# Patient Record
Sex: FEMALE | Race: WHITE | NOT HISPANIC OR LATINO | Employment: FULL TIME | URBAN - METROPOLITAN AREA
[De-identification: names, ages, dates, MRNs, and addresses within clinical notes are randomized per-mention and may not be internally consistent; named-entity substitution may affect disease eponyms.]

---

## 2017-01-03 ENCOUNTER — OFFICE VISIT (OUTPATIENT)
Dept: ORTHOPEDICS | Facility: CLINIC | Age: 27
End: 2017-01-03
Payer: OTHER MISCELLANEOUS

## 2017-01-03 VITALS — BODY MASS INDEX: 33.29 KG/M2 | WEIGHT: 195 LBS | HEIGHT: 64 IN | TEMPERATURE: 98 F

## 2017-01-03 DIAGNOSIS — G56.01 CARPAL TUNNEL SYNDROME OF RIGHT WRIST: Primary | ICD-10-CM

## 2017-01-03 PROCEDURE — 99024 POSTOP FOLLOW-UP VISIT: CPT | Performed by: PHYSICIAN ASSISTANT

## 2017-01-03 ASSESSMENT — PAIN SCALES - GENERAL: PAINLEVEL: NO PAIN (0)

## 2017-01-03 NOTE — PROGRESS NOTES
"HISTORY OF PRESENT ILLNESS:    Leah Cox is a 26 year old female who is seen in follow up for   Chief Complaint   Patient presents with     RECHECK     Right carpal tunnel release DOS: 10/5/16 (13 weeks postop)     Surgical Followup     PREOPERATIVE DIAGNOSIS: RIGHT carpal tunnel syndrome.  POSTOPERATIVE DIAGNOSIS: RIGHT carpal tunnel syndrome.NAME OF OPERATION: RIGHT carpal tunnel release.   Interval: Patient reports she is able to do most of her duties at work now that she has been loading and unloading. She has found that she wears her brace on occasion at work particularly when lifting awkward pieces of metal so they don't dig into her palm. She has noticed a decreased strength. She states one task of twisting open and items she continues to need help for, but she does feel like she is improving and her strength with each passing day.  Patient evaluation done with Dr. Sebastian    Physical Exam:  Vitals: Temp(Src) 98  F (36.7  C) (Temporal)  Ht 1.626 m (5' 4\")  Wt 88.451 kg (195 lb)  BMI 33.46 kg/m2  BMI= Body mass index is 33.46 kg/(m^2).  Constitutional: healthy, alert and no acute distress   Psychiatric: mentation appears normal and affect normal/bright  NEURO: no focal deficits  Location of surgery:  Right palm  Incision sites:  Incision is healed, swelling is minimal.  Palmar sensitivity is minimal  Range of motion:  Patient demonstrates good range of motion with flexion and extension as well as able to pull all fingers into a     ASSESSMENT:    Chief Complaint   Patient presents with     RECHECK     Right carpal tunnel release DOS: 10/5/16 (13 weeks postop)     Surgical Followup     PREOPERATIVE DIAGNOSIS: RIGHT carpal tunnel syndrome.  POSTOPERATIVE DIAGNOSIS: RIGHT carpal tunnel syndrome.NAME OF OPERATION: RIGHT carpal tunnel release.       ICD-10-CM    1. Carpal tunnel syndrome of right wrist G56.01      Patient is 13 weeks status post right carpal tunnel release. Patient is doing well with " her return to work. She is able to perform most of the duties at her workplace without incident.    Plan:   Patient is aware of techniques and strategies to assist her to do her job.  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 She can wear the brace only as needed  Return to clinic as needed for further concerns.    Barbara Vazquez PA-C   1/3/2017  1:05 PM      I attest I have seen and evaluated the patient.  I agree with above impression and plan.    hCristian Sebastian MD

## 2017-01-03 NOTE — NURSING NOTE
"Chief Complaint   Patient presents with     RECHECK     Right carpal tunnel release DOS: 10/5/16 (13 weeks postop)     Surgical Followup     PREOPERATIVE DIAGNOSIS: RIGHT carpal tunnel syndrome.  POSTOPERATIVE DIAGNOSIS: RIGHT carpal tunnel syndrome.NAME OF OPERATION: RIGHT carpal tunnel release.       Initial Temp(Src) 98  F (36.7  C) (Temporal)  Ht 1.626 m (5' 4\")  Wt 88.451 kg (195 lb)  BMI 33.46 kg/m2 Estimated body mass index is 33.46 kg/(m^2) as calculated from the following:    Height as of this encounter: 1.626 m (5' 4\").    Weight as of this encounter: 88.451 kg (195 lb).  BP completed using cuff size: NA (Not Taken)  HATTIE Oneil  "

## 2017-01-17 ENCOUNTER — TELEPHONE (OUTPATIENT)
Dept: ORTHOPEDICS | Facility: CLINIC | Age: 27
End: 2017-01-17

## 2017-03-03 ENCOUNTER — OFFICE VISIT (OUTPATIENT)
Dept: FAMILY MEDICINE | Facility: CLINIC | Age: 27
End: 2017-03-03
Payer: COMMERCIAL

## 2017-03-03 VITALS
HEIGHT: 64 IN | HEART RATE: 96 BPM | SYSTOLIC BLOOD PRESSURE: 122 MMHG | RESPIRATION RATE: 16 BRPM | TEMPERATURE: 97.1 F | BODY MASS INDEX: 32.95 KG/M2 | DIASTOLIC BLOOD PRESSURE: 82 MMHG | WEIGHT: 193 LBS

## 2017-03-03 DIAGNOSIS — R10.2 PELVIC PAIN IN FEMALE: ICD-10-CM

## 2017-03-03 DIAGNOSIS — Z00.01 ENCOUNTER FOR GENERAL ADULT MEDICAL EXAMINATION WITH ABNORMAL FINDINGS: Primary | ICD-10-CM

## 2017-03-03 DIAGNOSIS — N92.6 IRREGULAR PERIODS: ICD-10-CM

## 2017-03-03 DIAGNOSIS — F43.21 ADJUSTMENT DISORDER WITH DEPRESSED MOOD: ICD-10-CM

## 2017-03-03 PROCEDURE — 99213 OFFICE O/P EST LOW 20 MIN: CPT | Mod: 25 | Performed by: NURSE PRACTITIONER

## 2017-03-03 PROCEDURE — 99395 PREV VISIT EST AGE 18-39: CPT | Performed by: NURSE PRACTITIONER

## 2017-03-03 RX ORDER — CITALOPRAM HYDROBROMIDE 20 MG/1
TABLET ORAL
Qty: 30 TABLET | Refills: 0 | Status: SHIPPED | OUTPATIENT
Start: 2017-03-03 | End: 2017-12-08

## 2017-03-03 ASSESSMENT — PAIN SCALES - GENERAL: PAINLEVEL: NO PAIN (0)

## 2017-03-03 NOTE — MR AVS SNAPSHOT
After Visit Summary   3/3/2017    Leah Cox    MRN: [id number]           Patient Information     Date Of Birth          1990        Visit Information        Provider Department      3/3/2017 3:15 PM Alize Ragland APRN [facility]        Today's Diagnoses     Encounter for general adult medical examination with abnormal findings    -  1    Adjustment disorder with depressed mood        Irregular periods        Pelvic pain in female          Care Instructions      Preventive Health Recommendations  Female Ages 26 - 39  Yearly exam:   See your health care provider every year in order to    Review health changes.     Discuss preventive care.      Review your medicines if you your doctor has prescribed any.    Until age 30: Get a Pap test every three years (more often if you have had an abnormal result).    After age 30: Talk to your doctor about whether you should have a Pap test every 3 years or have a Pap test with HPV screening every 5 years.   You do not need a Pap test if your uterus was removed (hysterectomy) and you have not had cancer.  You should be tested each year for STDs (sexually transmitted diseases), if you're at risk.   Talk to your provider about how often to have your cholesterol checked.  If you are at risk for diabetes, you should have a diabetes test (fasting glucose).  Shots: Get a flu shot each year. Get a tetanus shot every 10 years.   Nutrition:     Eat at least 5 servings of fruits and vegetables each day.    Eat whole-grain bread, whole-wheat pasta and brown rice instead of white grains and rice.    Talk to your provider about Calcium and Vitamin D.     Lifestyle    Exercise at least 150 minutes a week (30 minutes a day, 5 days of the week). This will help you control your weight and prevent disease.    Limit alcohol to one drink per day.    No smoking.     Wear sunscreen to prevent skin cancer.    See your dentist every six months for an  exam and cleaning.          Follow-ups after your visit        Additional Services     MENTAL HEALTH REFERRAL       Your provider has referred you to: [facility]: [facility] - Counseling (Individual/Couples/Family) - [facility] [phone]   [email or web address]   *Please call to schedule an appointment.    All scheduling is subject to the client's specific insurance plan & benefits, provider/location availability, and provider clinical specialities.  Please arrive 15 minutes early for your first appointment and bring your completed paperwork.    Please be aware that coverage of these services is subject to the terms and limitations of your health insurance plan.  Call member services at your health plan with any benefit or coverage questions.            OB/GYN REFERRAL       Your provider has referred you to:  [facility]: [facility] [phone]   [email or web address]    Please be aware that coverage of these services is subject to the terms and limitations of your health insurance plan.  Call member services at your health plan with any benefit or coverage questions.      Please bring the following with you to your appointment:    (1) Any X-Rays, CTs or MRIs which have been performed.  Contact the facility where they were done to arrange for  prior to your scheduled appointment.   (2) List of current medications   (3) This referral request   (4) Any documents/labs given to you for this referral                  Who to contact     If you have questions or need follow up information about today's clinic visit or your schedule please contact [facility] directly at [phone].  Normal or non-critical lab and imaging results will be communicated to you by MyChart, letter or phone within 4 business days after the clinic has received the results. If you do not hear from us within  "7 days, please contact the clinic through [employer] or phone. If you have a critical or abnormal lab result, we will notify you by phone as soon as possible.  Submit refill requests through [employer] or call your pharmacy and they will forward the refill request to us. Please allow 3 business days for your refill to be completed.          Additional Information About Your Visit        [employer] Information     [employer] lets you send messages to your doctor, view your test results, renew your prescriptions, schedule appointments and more. To sign up, go to [email or web address] . Click on \"Log in\" on the left side of the screen, which will take you to the Welcome page. Then click on \"Sign up Now\" on the right side of the page.     You will be asked to enter the access code listed below, as well as some personal information. Please follow the directions to create your username and password.     Your access code is: [email or web address]  Expires: 4/3/2017 12:43 PM     Your access code will  in 90 days. If you need help or a new code, please call your [place] clinic or [phone].        Care EveryWhere ID     This is your Care EveryWhere ID. This could be used by other organizations to access your [place] medical records  [id number]        Your Vitals Were     Pulse Temperature Respirations Height Last Period Peak Flow    96 97.1  F (36.2  C) (Tympanic) 16 5' 3.5\" (1.613 m) 2017 (Exact Date) 98 L/min    BMI (Body Mass Index)                   33.65 kg/m2            Blood Pressure from Last 3 Encounters:   17 122/82   16 108/72   10/05/16 117/89    Weight from Last 3 Encounters:   17 193 lb (87.5 kg)   17 195 lb (88.5 kg)   16 191 lb 12.8 oz (87 kg)              We Performed the Following     MENTAL HEALTH REFERRAL     OB/GYN REFERRAL          Today's Medication Changes          These changes are accurate as of: 3/3/17  3:43 PM.  If you have any questions, ask your nurse or doctor.    "            Start taking these medicines.        Dose/Directions    citalopram 20 MG tablet   Commonly known as:  celeXA   Used for:  Adjustment disorder with depressed mood   Started by:  Alize Ragland APRN CNP        Take 1/2 tablet (10 mg) for 1-2 weeks, then increase to 1 tablet orally daily   Quantity:  30 tablet   Refills:  0            Where to get your medicines      These medications were sent to [facility] - [place], [place] - [address]   [address] [place] [address]     Phone:  [phone]     citalopram 20 MG tablet                Primary Care Provider Office Phone # Fax #    NADJA Pierce [phone]       [facility] [address]   [place] [address]        Thank you!     Thank you for choosing [facility]  for your care. Our goal is always to provide you with excellent care. Hearing back from our patients is one way we can continue to improve our services. Please take a few minutes to complete the written survey that you may receive in the mail after your visit with us. Thank you!             Your Updated Medication List - Protect others around you: Learn how to safely use, store and throw away your medicines at www.disposemymeds.org.          This list is accurate as of: 3/3/17  3:43 PM.  Always use your most recent med list.                   Brand Name Dispense Instructions for use    citalopram 20 MG tablet    celeXA    30 tablet    Take 1/2 tablet (10 mg) for 1-2 weeks, then increase to 1 tablet orally daily

## 2017-03-03 NOTE — PROGRESS NOTES
SUBJECTIVE:     CC: Leah Cox is an 26 year old woman who presents for preventive health visit.     Healthy Habits:    Do you get at least three servings of calcium containing foods daily (dairy, green leafy vegetables, etc.)? yes    Amount of exercise or daily activities, outside of work: none    Problems taking medications regularly not applicable    Medication side effects: No    Have you had an eye exam in the past two years? no    Do you see a dentist twice per year? no    Do you have sleep apnea, excessive snoring or daytime drowsiness?yes        She is also continuing to have lower pelvic pain, and is concerned about her very irregular cycle. Age of menarche was 12, she states she has always had irregular periods at one time she was on oral contraceptives, this did not seem to make any difference. She would like to achieve some regularity of her menses, stating she would like to become pregnant at some point in time. She has a menstrual period every 2-3 months, it typically starts out light, then becomes very heavy, stops, and then his heavy once again. She states she will bleed from 2-3 weeks. She recently had a CT of the abdomen and pelvis for complaint of pelvic pain, CT was negative.    She also is having some issues with depression. It seems to be getting worse recently, and is interfering with her relationship. She reports increased irritability, feeling depressed, over eating, and feeling like a failure. She does report thoughts of being better off dead, but denies any suicidal intent or plan. PHQ-9=24 she has never taken medication for depression.    Today's PHQ-2 Score:   PHQ-2 ( 1999 Pf[employer]) 6/24/2016 12/8/2015   Q1: Little interest or pleasure in doing things 0 0   Q2: Feeling down, depressed or hopeless 0 0   PHQ-2 Score 0 0     Abuse: Current or Past(Physical, Sexual or Emotional)- No  Do you feel safe in your environment - Yes    Social History   Substance Use Topics     Smoking  status: Current Every Day Smoker     Packs/day: 0.25     Smokeless tobacco: Never Used     Alcohol use 0.0 oz/week     0 Standard drinks or equivalent per week      Comment: occ.     The patient does not drink >3 drinks per day nor >7 drinks per week.    No results for input(s): CHOL, HDL, LDL, TRIG, CHOLHDLRATIO, NHDL in the last 09856 hours.    Reviewed orders with patient.  Reviewed health maintenance and updated orders accordingly - Yes    Mammo Decision Support:  Mammogram not appropriate for this patient based on age.    Pertinent mammograms are reviewed under the imaging tab.  History of abnormal Pap smear: NO - age 30-65 PAP every 5 years with negative HPV co-testing recommended    Reviewed and updated as needed this visit by clinical staff  Tobacco  Allergies  Meds         Reviewed and updated as needed this visit by Provider        No past medical history on file.   Past Surgical History   Procedure Laterality Date     Leep tx, cervical  2009     Release carpal tunnel Right 10/5/2016     Procedure: RELEASE CARPAL TUNNEL;  Surgeon: Christian Sebastian MD;  Location: [facility] OR     Obstetric History     No data available          ROS:  C: NEGATIVE for fever, chills, change in weight  I: NEGATIVE for worrisome rashes, moles or lesions  E: NEGATIVE for vision changes or irritation  ENT: NEGATIVE for ear, mouth and throat problems  R: NEGATIVE for significant cough or SOB  B: NEGATIVE for masses, tenderness or discharge  CV: NEGATIVE for chest pain, palpitations or peripheral edema  GI: NEGATIVE for nausea, abdominal pain, heartburn, or change in bowel habits   female: As noted in history of present illness  M: NEGATIVE for significant arthralgias or myalgia  N: NEGATIVE for weakness, dizziness or paresthesias  PSYCHIATRIC: As noted in history of present illness    Problem list, Medication list, Allergies, and Medical/Social/Surgical histories reviewed in [facility] and updated as appropriate.  BP Readings from Last  "3 Encounters:   03/03/17 122/82   11/29/16 108/72   10/05/16 117/89    Wt Readings from Last 3 Encounters:   03/03/17 193 lb (87.5 kg)   01/03/17 195 lb (88.5 kg)   12/01/16 191 lb 12.8 oz (87 kg)                  OBJECTIVE:     /82  Pulse 96  Temp 97.1  F (36.2  C) (Tympanic)  Resp 16  Ht 5' 3.5\" (1.613 m)  Wt 193 lb (87.5 kg)  LMP 02/03/2017 (Exact Date)  PF 98 L/min  BMI 33.65 kg/m2  EXAM:  GENERAL: healthy, alert and no distress  EYES: Eyes grossly normal to inspection, PERRL and conjunctivae and sclerae normal  HENT: ear canals and TM's normal, nose and mouth without ulcers or lesions  NECK: no adenopathy, no asymmetry, masses, or scars and thyroid normal to palpation  RESP: lungs clear to auscultation - no rales, rhonchi or wheezes  BREAST: normal without masses, tenderness or nipple discharge and no palpable axillary masses or adenopathy  CV: regular rate and rhythm, normal S1 S2, no S3 or S4, no murmur, click or rub, no peripheral edema and peripheral pulses strong  ABDOMEN: soft, nontender, no hepatosplenomegaly, no masses and bowel sounds normal  MS: no gross musculoskeletal defects noted, no edema  SKIN: no suspicious lesions or rashes  NEURO: Normal strength and tone, mentation intact and speech normal  PSYCH: mentation appears normal, affect normal/bright    ASSESSMENT/PLAN:         ICD-10-CM    1. Encounter for general adult medical examination with abnormal findings Z00.01    2. Adjustment disorder with depressed mood F43.21 MENTAL HEALTH REFERRAL     citalopram (CELEXA) 20 MG tablet   3. Irregular periods N92.6 OB/GYN REFERRAL     CANCELED: OB/GYN REFERRAL   4. Pelvic pain in female R10.2 OB/GYN REFERRAL     CANCELED: OB/GYN REFERRAL     Patient is referred to gynecology per her request. She is planned to schedule an appointment in [place]    COUNSELING:   Reviewed preventive health counseling, as reflected in patient instructions       Regular exercise       Healthy diet/nutrition       " "Osteoporosis Prevention/Bone Health    BP Screening:   Last 3 BP Readings:    BP Readings from Last 3 Encounters:   03/03/17 122/82   11/29/16 108/72   10/05/16 117/89       The following was recommended to the patient:  Re-screen BP within a year and recommended lifestyle modifications     reports that she has been smoking.  She has been smoking about 0.25 packs per day. She has never used smokeless tobacco.  Tobacco Cessation Action Plan: Information offered: Patient not interested at this time  Estimated body mass index is 33.65 kg/(m^2) as calculated from the following:    Height as of this encounter: 5' 3.5\" (1.613 m).    Weight as of this encounter: 193 lb (87.5 kg).   Weight management plan: Discussed healthy diet and exercise guidelines and patient will follow up in 6 months in clinic to re-evaluate.    Counseling Resources:  ATP IV Guidelines  Pooled Cohorts Equation Calculator  Breast Cancer Risk Calculator  FRAX Risk Assessment  ICSI Preventive Guidelines  Dietary Guidelines for Americans, 2010  USDA's MyPlate  ASA Prophylaxis  Lung CA Screening    NADJA Pierce CNP  [facility]  "

## 2017-03-03 NOTE — NURSING NOTE
"Chief Complaint   Patient presents with     Physical       Initial /82  Pulse 96  Temp 97.1  F (36.2  C) (Tympanic)  Resp 16  Ht 5' 3.5\" (1.613 m)  Wt 193 lb (87.5 kg)  LMP 02/03/2017 (Exact Date)  PF 98 L/min  BMI 33.65 kg/m2 Estimated body mass index is 33.65 kg/(m^2) as calculated from the following:    Height as of this encounter: 5' 3.5\" (1.613 m).    Weight as of this encounter: 193 lb (87.5 kg).  BP completed using cuff size: josé Fritz MA      "

## 2017-03-08 ASSESSMENT — PATIENT HEALTH QUESTIONNAIRE - PHQ9: SUM OF ALL RESPONSES TO PHQ QUESTIONS 1-9: 23

## 2017-03-14 ENCOUNTER — TELEPHONE (OUTPATIENT)
Dept: FAMILY MEDICINE | Facility: CLINIC | Age: 27
End: 2017-03-14

## 2017-03-14 DIAGNOSIS — N92.0 EXCESSIVE OR FREQUENT MENSTRUATION: ICD-10-CM

## 2017-03-14 DIAGNOSIS — N92.0 EXCESSIVE OR FREQUENT MENSTRUATION: Primary | ICD-10-CM

## 2017-03-14 LAB — HGB BLD-MCNC: 15.1 G/DL (ref 11.7–15.7)

## 2017-03-14 PROCEDURE — 85018 HEMOGLOBIN: CPT | Performed by: NURSE PRACTITIONER

## 2017-03-14 PROCEDURE — 36415 COLL VENOUS BLD VENIPUNCTURE: CPT | Performed by: NURSE PRACTITIONER

## 2017-03-14 NOTE — TELEPHONE ENCOUNTER
Reason for Call: Request for an order or referral:    Order or referral being requested: Order for lab work for Anemia - Pt states she is bleeding again and is feeling weak and fatigued    Date needed: as soon as possible    Has the patient been seen by the PCP for this problem? YES    Additional comments: Pt has appt with OB/GYN on 04/07 in [place]    Phone number Patient can be reached at:  Home number on file [phone] (home)    Best Time:  any    Can we leave a detailed message on this number?  YES    Call taken on 3/14/2017 at 8:20 AM by Gia Alford

## 2017-06-28 ENCOUNTER — TELEPHONE (OUTPATIENT)
Dept: ORTHOPEDICS | Facility: CLINIC | Age: 27
End: 2017-06-28

## 2017-06-28 NOTE — TELEPHONE ENCOUNTER
Reason for Call:  Other call back    Detailed comments: Dr. Sebastian patient, Oct., 2016 had right Carpal tunnel surgery - patient has a new position at work and she is using a lot of tools that require gripping and squeezing, she has hand soreness and feels hot by pinky on side of hand, would like a call     Phone Number Patient can be reached at: Cell number on file:    Telephone Information:   Mobile [phone]       Best Time: any time     Can we leave a detailed message on this number? YES    Call taken on 6/28/2017 at 4:03 PM by Marybeth Vivas

## 2017-06-28 NOTE — TELEPHONE ENCOUNTER
I spoke with pt. She has new position. Started last week. She is noting soreness in her hand, gets a warm feeling after she does a task that is in her 4th and 5th finger. Denies numbness or tingling. Is not able to wear her wrist splint in this position.  I told pt that provider will review message tomorrow and someone will communicate with her. Pt works until 1530, you may leave a message and then she would call us back. NADIA Kiser

## 2017-06-29 NOTE — TELEPHONE ENCOUNTER
Called patient to schedule an appointment but her mailbox was full so I was unable to leave a message. Will try again.

## 2017-06-29 NOTE — TELEPHONE ENCOUNTER
4th and 5th finger is typically ulnar nerve problems.  Wrist splint not helpful with this.  Avoid bending at or leaning on elbow if possible.

## 2017-06-30 NOTE — TELEPHONE ENCOUNTER
I spoke with pt. I said she can use OTC meds like Tylenol and Ibuprofen for discomfort, ice if she wishes. NADIA Kiser

## 2017-06-30 NOTE — TELEPHONE ENCOUNTER
Please see message below.   Patient is scheduled on 07/17 -  Would like a call from a nurse to see if there is anything she can do about the pain until her appt.   Hoping for a call today please.  Thank you,  Marybeth.

## 2017-07-24 ENCOUNTER — OFFICE VISIT (OUTPATIENT)
Dept: ORTHOPEDICS | Facility: CLINIC | Age: 27
End: 2017-07-24
Payer: COMMERCIAL

## 2017-07-24 VITALS — BODY MASS INDEX: 33.12 KG/M2 | HEIGHT: 64 IN | TEMPERATURE: 98.7 F | WEIGHT: 194 LBS

## 2017-07-24 DIAGNOSIS — M67.431 GANGLION CYST OF DORSUM OF RIGHT WRIST: Primary | ICD-10-CM

## 2017-07-24 DIAGNOSIS — M25.749 METACARPAL BOSS: ICD-10-CM

## 2017-07-24 PROCEDURE — 99213 OFFICE O/P EST LOW 20 MIN: CPT | Performed by: ORTHOPAEDIC SURGERY

## 2017-07-24 ASSESSMENT — PAIN SCALES - GENERAL: PAINLEVEL: NO PAIN (0)

## 2017-07-24 NOTE — MR AVS SNAPSHOT
"              After Visit Summary   7/24/2017    Leah Cox    MRN: [id number]           Patient Information     Date Of Birth          1990        Visit Information        Provider Department      7/24/2017 4:00 PM Christian Sebastian MD [facility]         Follow-ups after your visit        Your next 10 appointments already scheduled     Jul 24, 2017  4:00 PM CDT   New Visit with Christian Sebastian MD   [facility] ([facility]    [address] [place] [id number]   [phone]              Who to contact     If you have questions or need follow up information about today's clinic visit or your schedule please contact [facility] directly at [phone].  Normal or non-critical lab and imaging results will be communicated to you by MyChart, letter or phone within 4 business days after the clinic has received the results. If you do not hear from us within 7 days, please contact the clinic through Arctrievalhart or phone. If you have a critical or abnormal lab result, we will notify you by phone as soon as possible.  Submit refill requests through ScreenMedix or call your pharmacy and they will forward the refill request to us. Please allow 3 business days for your refill to be completed.          Additional Information About Your Visit        ArctrievalharProntoForms Information     ScreenMedix lets you send messages to your doctor, view your test results, renew your prescriptions, schedule appointments and more. To sign up, go to [email or web address] . Click on \"Log in\" on the left side of the screen, which will take you to the Welcome page. Then click on \"Sign up Now\" on the right side of the page.     You will be asked to enter the access code listed below, as well as some personal information. Please follow the directions to create your username and password.     Your access code is: [email or web address]  Expires: 10/15/2017  4:33 PM     Your access code will " " in 90 days. If you need help or a new code, please call your [facility] clinic or [phone].        Care EveryWhere ID     This is your Care EveryWhere ID. This could be used by other organizations to access your [facility] medical records  [id number]        Your Vitals Were     Temperature Height BMI (Body Mass Index)             98.7  F (37.1  C) (Temporal) 1.613 m (5' 3.5\") 33.83 kg/m2          Blood Pressure from Last 3 Encounters:   17 122/82   16 108/72   10/05/16 117/89    Weight from Last 3 Encounters:   17 88 kg (194 lb)   17 87.5 kg (193 lb)   17 88.5 kg (195 lb)              Today, you had the following     No orders found for display       Primary Care Provider Office Phone # Fax #    Alize Ragland, NADJA [facility] [phone]       [facility] [address] [facility]   [facility] [address]        Equal Access to Services     [facility]: Hadii aad ku hadasho Soomaali, waaxda luqadaha, qaybta kaalmada adeegyada, maday good . So [facility] [phone].    ATENCIÓN: Si habla español, tiene a varghese disposición servicios gratuitos de asistencia lingüística. Llame al [phone].    We comply with applicable federal civil rights laws and [place] laws. We do not discriminate on the basis of race, color, national origin, age, disability sex, sexual orientation or gender identity.            Thank you!     Thank you for choosing [facility]  for your care. Our goal is always to provide you with excellent care. Hearing back from our patients is one way we can continue to improve our services. Please take a few minutes to complete the written survey that you may receive in the mail after your visit with us. Thank you!             Your Updated Medication List - Protect others around you: Learn how to safely use, store and throw away your medicines at www.disposemymeds.org.          This list is accurate as of: 17  " 3:54 PM.  Always use your most recent med list.                   Brand Name Dispense Instructions for use Diagnosis    citalopram 20 MG tablet    celeXA    30 tablet    Take 1/2 tablet (10 mg) for 1-2 weeks, then increase to 1 tablet orally daily    Adjustment disorder with depressed mood

## 2017-07-24 NOTE — PROGRESS NOTES
"HISTORY OF PRESENT ILLNESS:    Leah Cox is a 26 year old female who is seen in follow up for   Chief Complaint   Patient presents with     RECHECK     NEW ISSUE: Right hand Ganglion cyst.   Present symptoms: Patient reports a cyst on the back of her hand that she believes was present during the time she was undergoing her carpal tunnel symptoms. The carpal tunnel symptoms were predominant at the time.  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 Patient states she does continue to utilize a brace and gloves at work. Patient has been attempting to massage this cyst to try to reduce it.  Patient states the wrist bothers her at work as well as with some domestic chores of washing dishes or putting sheets on a bed.  Treatments tried to this point: As above  Patient evaluation done with Dr. Sebastian    Physical Exam:  Vitals: Temp 98.7  F (37.1  C) (Temporal)  Ht 1.613 m (5' 3.5\")  Wt 88 kg (194 lb)  BMI 33.83 kg/m2  BMI= Body mass index is 33.83 kg/(m^2).  Constitutional: healthy, alert and no acute distress   Psychiatric: mentation appears normal and affect normal/bright  NEURO: no focal deficits  SKIN: no excoriation or erythema. No signs of infection.  JOINT/EXTREMITIES:  Affected extremity pulses are easily palpable.  Right Wrist Exam: WRIST:  Inspection: There is no significant surrounding swelling however there is a firm nodule at the proximal base of the third metacarpal on the dorsal side of her right hand.  With palpation this is nontender however it is very firm.  Tapping of this nodule does cause tingling into her middle finger back of the dorsum of the hand.  Right carpal tunnel scar is well-healed with no surrounding swelling.  Range of Motion: Normal into all fingers and wrist  Patient with no additional pain with resistance in flexion or extension.  She is " able to maintain good strength.    IMAGING INTERPRETATION: No previous hand films are available      ASSESSMENT:  Chief Complaint   Patient presents with     RECHECK     NEW ISSUE: Right hand Ganglion cyst.       ICD-10-CM    1. Ganglion cyst of dorsum of right wrist M67.431    2. Metacarpal boss M25.749      Patient with a cystt/nodule on the dorsum at the proximal base of the right third metacarpal.  This nodule is more firm than usual for a ganglion cyst which may be more likely metacarpal bossing however this is nearby a joint so ganglion cyst cannot be excluded.    Plan:   Patient is advised of the 2 possibilities of diagnosis.   Patient is asked to attempt to protect the cyst with felt or corn padding to protect from any unintentional banging of the cyst. This would be a donut formation around the cyst/nodule .she was given some felt to do this and she is encouraged to purchase from the store in the podiatry section corn protectors. Patient should wear this padding especially at work since she does utilize a splint that may be causing additional irritation to the area. Patient is also asked to refrain from massaging the area.   Patient will give this strategy a couple weeks to determine if there is any improvement in symptoms.   If no improvement surgical exploration and removal of the cyst/nodule would be performed.   Return to clinic as needed     Scribed by  Barbara Vazquez PA-C   7/24/2017  5:08 PM      I attest I have seen and evaluated the patient.  I agree with above impression and plan.    Christian Sebastian MD

## 2017-07-24 NOTE — NURSING NOTE
"Chief Complaint   Patient presents with     RECHECK     NEW ISSUE: Right hand Ganglion cyst.       Initial Temp 98.7  F (37.1  C) (Temporal)  Ht 1.613 m (5' 3.5\")  Wt 88 kg (194 lb)  BMI 33.83 kg/m2 Estimated body mass index is 33.83 kg/(m^2) as calculated from the following:    Height as of this encounter: 1.613 m (5' 3.5\").    Weight as of this encounter: 88 kg (194 lb).  Medication Reconciliation: complete   HATTIE Oneil  "

## 2017-07-24 NOTE — LETTER
"    7/24/2017         RE: Leah Cox  [address] [facility] [address]        Dear Colleague,    Thank you for referring your patient, Leah Cox, to the [facility]. Please see a copy of my visit note below.    HISTORY OF PRESENT ILLNESS:    Leah Cox is a 26 year old female who is seen in follow up for   Chief Complaint   Patient presents with     RECHECK     NEW ISSUE: Right hand Ganglion cyst.   Present symptoms: Patient reports a cyst on the back of her hand that she believes was present during the time she was undergoing her carpal tunnel symptoms. The carpal tunnel symptoms were predominant at the time.  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 Patient states she does continue to utilize a brace and gloves at work. Patient has been attempting to massage this cyst to try to reduce it.  Patient states the wrist bothers her at work as well as with some domestic chores of washing dishes or putting sheets on a bed.  Treatments tried to this point: As above  Patient evaluation done with Dr. Sebastian    Physical Exam:  Vitals: Temp 98.7  F (37.1  C) (Temporal)  Ht 1.613 m (5' 3.5\")  Wt 88 kg (194 lb)  BMI 33.83 kg/m2  BMI= Body mass index is 33.83 kg/(m^2).  Constitutional: healthy, alert and no acute distress   Psychiatric: mentation appears normal and affect normal/bright  NEURO: no focal deficits  SKIN: no excoriation or erythema. No signs of infection.  JOINT/EXTREMITIES:  Affected extremity pulses are easily palpable.  Right Wrist Exam: WRIST:  Inspection: There is no significant surrounding swelling however there is a firm nodule at the proximal base of the third metacarpal on the dorsal side of her right hand.  With palpation this is nontender however it is very firm.  Tapping of this nodule does cause tingling into her " middle finger back of the dorsum of the hand.  Right carpal tunnel scar is well-healed with no surrounding swelling.  Range of Motion: Normal into all fingers and wrist  Patient with no additional pain with resistance in flexion or extension.  She is able to maintain good strength.    IMAGING INTERPRETATION: No previous hand films are available      ASSESSMENT:  Chief Complaint   Patient presents with     RECHECK     NEW ISSUE: Right hand Ganglion cyst.       ICD-10-CM    1. Ganglion cyst of dorsum of right wrist M67.431    2. Metacarpal boss M25.749      Patient with a cystt/nodule on the dorsum at the proximal base of the right third metacarpal.  This nodule is more firm than usual for a ganglion cyst which may be more likely metacarpal bossing however this is nearby a joint so ganglion cyst cannot be excluded.    Plan:   Patient is advised of the 2 possibilities of diagnosis.   Patient is asked to attempt to protect the cyst with felt or corn padding to protect from any unintentional banging of the cyst. This would be a donut formation around the cyst/nodule .she was given some felt to do this and she is encouraged to purchase from the store in the podiatry section corn protectors. Patient should wear this padding especially at work since she does utilize a splint that may be causing additional irritation to the area. Patient is also asked to refrain from massaging the area.   Patient will give this strategy a couple weeks to determine if there is any improvement in symptoms.   If no improvement surgical exploration and removal of the cyst/nodule would be performed.   Return to clinic as needed     Scribed by  Barbara Vazquez PA-C   7/24/2017  5:08 PM      I attest I have seen and evaluated the patient.  I agree with above impression and plan.    Christian Sebastian MD    Again, thank you for allowing me to participate in the care of your patient.        Sincerely,        Christian Sebastian MD

## 2017-10-02 ENCOUNTER — RADIANT APPOINTMENT (OUTPATIENT)
Dept: GENERAL RADIOLOGY | Facility: OTHER | Age: 27
End: 2017-10-02
Attending: PHYSICAL MEDICINE & REHABILITATION
Payer: COMMERCIAL

## 2017-10-02 ENCOUNTER — OFFICE VISIT (OUTPATIENT)
Dept: ORTHOPEDICS | Facility: OTHER | Age: 27
End: 2017-10-02
Payer: COMMERCIAL

## 2017-10-02 VITALS
BODY MASS INDEX: 33.12 KG/M2 | WEIGHT: 194 LBS | HEIGHT: 64 IN | DIASTOLIC BLOOD PRESSURE: 84 MMHG | SYSTOLIC BLOOD PRESSURE: 122 MMHG

## 2017-10-02 DIAGNOSIS — M79.10 MYALGIA: ICD-10-CM

## 2017-10-02 DIAGNOSIS — S29.019A THORACIC MYOFASCIAL STRAIN, INITIAL ENCOUNTER: ICD-10-CM

## 2017-10-02 DIAGNOSIS — M54.41 CHRONIC BILATERAL LOW BACK PAIN WITH RIGHT-SIDED SCIATICA: Primary | ICD-10-CM

## 2017-10-02 DIAGNOSIS — G89.29 CHRONIC BILATERAL LOW BACK PAIN WITH RIGHT-SIDED SCIATICA: Primary | ICD-10-CM

## 2017-10-02 DIAGNOSIS — M54.50 ACUTE LOW BACK PAIN: ICD-10-CM

## 2017-10-02 PROCEDURE — 99204 OFFICE O/P NEW MOD 45 MIN: CPT | Performed by: PHYSICAL MEDICINE & REHABILITATION

## 2017-10-02 PROCEDURE — 72100 X-RAY EXAM L-S SPINE 2/3 VWS: CPT

## 2017-10-02 RX ORDER — CYCLOBENZAPRINE HCL 5 MG
TABLET ORAL
Qty: 30 TABLET | Refills: 0 | Status: SHIPPED | OUTPATIENT
Start: 2017-10-02 | End: 2017-12-08

## 2017-10-02 NOTE — LETTER
October 2, 2017      Leah Cox  [address] [address] [place] [id number]          To Whom It May Concern:        Leah Cox  was seen on 10/2/17 for back pain.  She is able to return to work with the following restrictions for 2 weeks:    -No lifting over 15 lbs  -No repetitive bending    After 2 weeks, she may return to full work duties. She will be seen in follow up in 4-6 weeks for reevaluation. Thank you for your help in advance.         Sincerely,        Mirella Jaimes MD, CAQ

## 2017-10-02 NOTE — MR AVS SNAPSHOT
After Visit Summary   10/2/2017    Leah Cox    MRN: [id number]           Patient Information     Date Of Birth          1990        Visit Information        Provider Department      10/2/2017 2:20 PM Mirella Jaimes MD [facility]        Today's Diagnoses     Chronic bilateral low back pain with right-sided sciatica    -  1    Myalgia          Care Instructions    Today's Plan of Care:  -Referral to Dania Grajeda - [phone]  -Rehab: Physical Therapy: [facility] - [phone]. Please do 5-6 days of exercises per week between formal sessions and the home exercises they provide.  -Prescription Medication as directed: Cyclobenzaprine. Do not take prior to driving  -Work restrictions: 15 lb lifting and no repetitive bending for 2 weeks, full duty after. Letter provided.  -Discussed weight loss  -Ibuprofen (Advil) maximum of 800mg every 4-6 hours with food as needed for pain. Or Acetaminophen (Tylenol) 1000mg every 4-6 hours as needed (Maximum of 3000mg/day)    Follow Up: 4-6 weeks or sooner if symptoms fail to improve or worsen. Call with any questions or concerns.               Follow-ups after your visit        Additional Services     CHIROPRACTIC REFERRAL       Your provider has referred you to: Dania Grajeda - [phone]    Please be aware that coverage of these services is subject to the terms and limitations of your health insurance plan.  Call member services at your health plan with any benefit or coverage questions.      Please bring the following to your appointment:    >>   Any x-rays, CTs or MRIs which have been performed.  Contact the facility where they were done to arrange for  prior to your scheduled appointment.    >>   List of current medications   >>   This referral request   >>   Any documents/labs given to you for this referral            PHYSICAL THERAPY REFERRAL       *This therapy  "referral will be filtered to a centralized scheduling office at [facility] and the patient will receive a call to schedule an appointment at a [facility] location most convenient for them. *     [facility] provides Physical Therapy evaluation and treatment and many specialty services across the [facility] system.  If requesting a specialty program, please choose from the list below.    If you have not heard from the scheduling office within 2 business days, please call [phone] for all locations, with the exception of [place], please call [phone].  Treatment: Evaluation & Treatment  Special Instructions/Modalities: none  Special Programs: None    Please be aware that coverage of these services is subject to the terms and limitations of your health insurance plan.  Call member services at your health plan with any benefit or coverage questions.      **Note to Provider:  If you are referring outside of [facility] for the therapy appointment, please list the name of the location in the \"special instructions\" above, print the referral and give to the patient to schedule the appointment.                  Who to contact     If you have questions or need follow up information about today's clinic visit or your schedule please contact [facility] directly at [phone].  Normal or non-critical lab and imaging results will be communicated to you by MyChart, letter or phone within 4 business days after the clinic has received the results. If you do not hear from us within 7 days, please contact the clinic through MyChart or phone. If you have a critical or abnormal lab result, we will notify you by phone as soon as possible.  Submit refill requests through AcEmpire or call your pharmacy and they will forward the refill request to us. Please allow 3 business days for your refill to be completed.          Additional Information About Your Visit        MyChart " "Information     Bridj lets you send messages to your doctor, view your test results, renew your prescriptions, schedule appointments and more. To sign up, go to [email or web address] . Click on \"Log in\" on the left side of the screen, which will take you to the Welcome page. Then click on \"Sign up Now\" on the right side of the page.     You will be asked to enter the access code listed below, as well as some personal information. Please follow the directions to create your username and password.     Your access code is: [email or web address]  Expires: 10/15/2017  4:33 PM     Your access code will  in 90 days. If you need help or a new code, please call your [facility] clinic or [phone].        Care EveryWhere ID     This is your Care EveryWhere ID. This could be used by other organizations to access your [facility] medical records  [id number]        Your Vitals Were     Height BMI (Body Mass Index)                5' 3.5\" (1.613 m) 33.83 kg/m2           Blood Pressure from Last 3 Encounters:   10/02/17 122/84   17 122/82   16 108/72    Weight from Last 3 Encounters:   10/02/17 194 lb (88 kg)   17 194 lb (88 kg)   17 193 lb (87.5 kg)              We Performed the Following     CHIROPRACTIC REFERRAL     PHYSICAL THERAPY REFERRAL          Today's Medication Changes          These changes are accurate as of: 10/2/17  3:04 PM.  If you have any questions, ask your nurse or doctor.               Start taking these medicines.        Dose/Directions    cyclobenzaprine 5 MG tablet   Commonly known as:  FLEXERIL   Used for:  Chronic bilateral low back pain with right-sided sciatica, Myalgia   Started by:  Mirella Jaimes MD        Take 1-2 tabs as needed at bedtime for pain interrupting sleep   Quantity:  30 tablet   Refills:  0            Where to get your medicines      These medications were sent to [facility], [place] - [address], " [place] [address]     Phone:  [phone]     cyclobenzaprine 5 MG tablet                Primary Care Provider Office Phone # Fax #    Alize Ragland APRKIAH [facility] [phone]       [address] [place] [address]        Equal Access to Services     [facility]: Hadii aad ku hadasho Soomaali, waaxda luqadaha, qaybta kaalmada adeegyada, waxay idiin hayaan adedanie khluzleanna lasocorro . So [facility] [phone].    ATENCIÓN: Si habla español, tiene a varghese disposición servicios gratuitos de asistencia lingüística. [facility] [phone].    We comply with applicable federal civil rights laws and [place] laws. We do not discriminate on the basis of race, color, national origin, age, disability, sex, sexual orientation, or gender identity.            Thank you!     Thank you for choosing [facility]  for your care. Our goal is always to provide you with excellent care. Hearing back from our patients is one way we can continue to improve our services. Please take a few minutes to complete the written survey that you may receive in the mail after your visit with us. Thank you!             Your Updated Medication List - Protect others around you: Learn how to safely use, store and throw away your medicines at www.disposemymeds.org.          This list is accurate as of: 10/2/17  3:04 PM.  Always use your most recent med list.                   Brand Name Dispense Instructions for use Diagnosis    citalopram 20 MG tablet    celeXA    30 tablet    Take 1/2 tablet (10 mg) for 1-2 weeks, then increase to 1 tablet orally daily    Adjustment disorder with depressed mood       cyclobenzaprine 5 MG tablet    FLEXERIL    30 tablet    Take 1-2 tabs as needed at bedtime for pain interrupting sleep    Chronic bilateral low back pain with right-sided sciatica, Myalgia

## 2017-10-02 NOTE — PROGRESS NOTES
Sports Medicine Clinic Visit    PCP: Alize Ragland    CC: Patient presents with:  Back Pain      HPI:  Leah Cox is a 26 year old female who is seen as a self referral.  She notes bilateral mid-upper back pain that started on 9/30/17 with insidious onset.  She also notes bilateral low back pain, left is worse than the right, around the glute area, that worsened 2 years ago.  This pain originally started in 2007.  At the time, she had an MRI of the low back, which was negative.  Currently,  she rates the pain at a  8/10 at its worst and a 8/10 currently.  Symptoms are relieved with heat, ibuprofen, and Tylenol.  They are worsened by trunk flexion and coming back up from trunk flexion, sitting or standing for a prolonged period of time.  She endorses numbness, tingling and weakness in the right leg.   The numbness is intermittent and in the right lateral thigh.  She denies swelling, bruising, popping, grinding, catching, locking, instability, pain in other joints and fever, chills.  Other treatment has included nothing.       Review of Systems:  Musculoskeletal: as above  Remainder of review of systems is negative including constitutional, eyes, ENT, CV, pulmonary, GI, , endocrine, skin, hematologic, and neurologic except as noted in HPI or medical history.    History reviewed. No pertinent past surgical/medical/family/social history other than as mentioned in HPI.    No past medical history on file.  Past Surgical History:   Procedure Laterality Date     LEEP TX, CERVICAL  2009     RELEASE CARPAL TUNNEL Right 10/5/2016    Procedure: RELEASE CARPAL TUNNEL;  Surgeon: Christian Sebastian MD;  Location:  OR     No family history on file.  Social History     Social History     Marital status: Single     Spouse name: N/A     Number of children: N/A     Years of education: N/A     Occupational History     Not on file.     Social History Main Topics     Smoking status: Current Every Day Smoker      "Packs/day: 0.25     Smokeless tobacco: Never Used     Alcohol use 0.0 oz/week     0 Standard drinks or equivalent per week      Comment: occ.     Drug use: No     Sexual activity: Yes     Partners: Male     Other Topics Concern     Not on file     Social History Narrative       She works as a , loading an assembly line.     Current Outpatient Prescriptions   Medication     cyclobenzaprine (FLEXERIL) 5 MG tablet     citalopram (CELEXA) 20 MG tablet     No current facility-administered medications for this visit.      No Known Allergies      Objective:  /84  Ht 5' 3.5\" (1.613 m)  Wt 194 lb (88 kg)  BMI 33.83 kg/m2    General: Alert and in mild distress    Head: Normocephalic, atraumatic  Eyes: no scleral icterus or conjunctival erythema   Oropharynx:  Mucous membranes moist  Skin: no erythema, ecchymosis, petechiae, or jaundice  CV: regular rhythm by palpation, 2+ distal pulses  Resp: normal respiratory effort without conversational dyspnea   Psych: normal mood and affect    Gait: Slow  Neuro: Motor strength and sensation as noted below    Musculoskeletal:    Low back exam:    Inspection:     no visible deformity in the low back       normal skin       normal vascular       normal lymphatic       no asymmetry    Palpation:  Tender to palpation over the sacral spine, bilateral thoracic paraspinal muscles, and left gluteal muscles.      ROM: Decreased lumbar extension.  Full lumbar flexion, rotation, and lateral flexion  All lumbar movements are painful.    Strength:  5/5 hip flexion/abduction/adduction, knee flexion/extension, ankle dorsiflexion/plantarflexion, great toe extension, toe flexion    Sensation:    grossly intact throughout lower extremities    Special tests:      straight leg raise negative for radicular pain    Radiology:  Independent visualization of images performed.    Recent Results (from the past 744 hour(s))   XR Lumbar Spine 2/3 Views    Narrative    XR LUMBAR SPINE 2-3 VIEWS   " 10/2/2017 2:36 PM     HISTORY:  Low back pain      Impression    IMPRESSION: Unremarkable exam.    PATRICA GIRALDO MD     Assessment:  1. Chronic bilateral low back pain with right-sided sciatica    2. Myalgia    3. Thoracic myofascial strain, initial encounter        Plan:  Discussed the assessment with the patient and developed a plan together:  -Suspect this is mostly muscular and functional related to core weakness, weight, posture, etc.  Discussed the role of extra truncal weight on the mid and low back.  She would benefit from healthy weight loss using diet and exercise, no more than 1-2 pounds per week.  -Referral to Dania Grajeda - [phone]  -Rehab: Physical Therapy: [facility] - [phone]. Please do 5-6 days of exercises per week between formal sessions and the home exercises they provide.  -Prescription Medication as directed: Flexeril at bedtime as needed for muscle spasms/tightness. Do not take prior to driving  -Work restrictions: 15 lb lifting and no repetitive bending for 2 weeks, full duty after. Letter provided.  -Discussed weight loss  -Ibuprofen (Advil) maximum of 800mg every 4-6 hours with food as needed for pain and/or Acetaminophen (Tylenol) 1000mg every 4-6 hours as needed (Maximum of 3000mg/day)    Follow Up: 4-6 weeks or sooner if symptoms fail to improve or worsen. Call with any questions or concerns.     Carmen Jaimes MD, CAQ  [facility]

## 2017-10-02 NOTE — PATIENT INSTRUCTIONS
Today's Plan of Care:  -Referral to Dania Grajeda - [phone]  -Rehab: [facility] - [phone]. Please do 5-6 days of exercises per week between formal sessions and the home exercises they provide.  -Prescription Medication as directed: Cyclobenzaprine. Do not take prior to driving  -Work restrictions: 15 lb lifting and no repetitive bending for 2 weeks, full duty after. Letter provided.  -Discussed weight loss  -Ibuprofen (Advil) maximum of 800mg every 4-6 hours with food as needed for pain. Or Acetaminophen (Tylenol) 1000mg every 4-6 hours as needed (Maximum of 3000mg/day)    Follow Up: 4-6 weeks or sooner if symptoms fail to improve or worsen. Call with any questions or concerns.

## 2017-10-13 ENCOUNTER — HOSPITAL ENCOUNTER (OUTPATIENT)
Dept: PHYSICAL THERAPY | Facility: CLINIC | Age: 27
Setting detail: THERAPIES SERIES
End: 2017-10-13
Attending: PHYSICAL MEDICINE & REHABILITATION
Payer: COMMERCIAL

## 2017-10-13 PROCEDURE — 97162 PT EVAL MOD COMPLEX 30 MIN: CPT | Mod: GP | Performed by: PHYSICAL THERAPIST

## 2017-10-13 PROCEDURE — 40000718 ZZHC STATISTIC PT DEPARTMENT ORTHO VISIT: Performed by: PHYSICAL THERAPIST

## 2017-10-13 PROCEDURE — 97140 MANUAL THERAPY 1/> REGIONS: CPT | Mod: GP | Performed by: PHYSICAL THERAPIST

## 2017-10-13 PROCEDURE — 97530 THERAPEUTIC ACTIVITIES: CPT | Mod: GP,59 | Performed by: PHYSICAL THERAPIST

## 2017-10-18 NOTE — ADDENDUM NOTE
Encounter addended by: Mirella Peña, PT on: 10/18/2017  3:02 PM<BR>     Actions taken: Flowsheet accepted

## 2017-10-18 NOTE — PROGRESS NOTES
10/13/17 1608   General Information   Type of Visit Initial OP Ortho PT Evaluation   Start of Care Date 10/13/17   Referring Physician Dr. Mirella Jaimes   Patient/Family Goals Statement to be able to walk her dog and to sleep through the night   Orders Evaluate and Treat   Date of Order 10/02/17   Insurance Type Other  (preferred one)   Insurance Comments/Visits Authorized Preferred One   Medical Diagnosis chronic (B) low back pain   Surgical/Medical history reviewed Yes   Precautions/Limitations no known precautions/limitations   Body Part(s)   Body Part(s) Lumbar Spine/SI   Presentation and Etiology   Pertinent history of current problem (include personal factors and/or comorbidities that impact the POC) Pt reports that she has had LBP x approximately 2 years (hips tend to be sore all the time). It has gotten worse and she now gets pain and numbness down her R lateral thigh to mid thigh.  She works at [employer] as a  and has been put on light duty. She notes that her sleep is affected and she can not sleep for more than 6 hours although the flexoril that Dr. Jaimes prescribed has helped that some.  She reports she sleeps on her belly with her L knee drawn up. hips tend to be sore all the time. it has been going on for a while. sometimes she gets a shock down her leg and numbness in R leg and it shoots down to mid thigh. has been going on for a couple of years now.   Pt was working  at [employer] as a .  can sleep 6-7 hours tosses and turns due to back pain sleeps on belly/    Impairments A. Pain;B. Decreased WB tolerance;D. Decreased ROM   Functional Limitations perform activities of daily living;perform required work activities;perform desired leisure / sports activities   Symptom Location low back. and (B) hips iliac crests and down R leg to knee   How/Where did it occur With repetition/overuse;From insidious onset   Onset date of current episode/exacerbation 09/29/17   Chronicity  Chronic   Pain rating (0-10 point scale) Best (/10);Worst (/10)   Best (/10) 5   Worst (/10) 8   Pain quality B. Dull;C. Aching;D. Burning;E. Shooting   Frequency of pain/symptoms A. Constant   Pain/symptoms are: The same all the time   Pain/symptoms exacerbated by C. Lifting;D. Carrying;G. Certain positions;K. Home tasks;L. Work tasks;A. Sitting;I. Bending   Pain/symptoms eased by B. Walking;G. Heat;H. Cold;I. OTC medication(s);C. Rest;E. Changing positions   Progression of symptoms since onset: Worsened  (more consistent )   Current / Previous Interventions   Diagnostic Tests: X-ray   X-ray Results unremarkable   Prior Level of Function   Prior Level of Function-Mobility inedp   Prior Level of Function-ADLs indep   Functional Level Prior Comment inedp   Current Level of Function   Current Community Support Family/friend caregiver   Patient role/employment history A. Employed   Employment Comments [employer]   Living environment Apartment/condo   Home/community accessibility 12 stairs  to her apartment   Current equipment-Gait/Locomotion None   Fall Risk Screen   Fall screen completed by PT   Per patient - Fall 2 or more times in past year? No   Per patient - Fall with injury in past year? No   Is patient a fall risk? No   System Outcome Measures   Outcome Measures Low Back Pain (see Oswestry and Hi)   Functional Scales   Functional Scales Patient Specific Functional Scale   Patient Specific Functional Scale Q1(/10):;Q1:;Q2:;Q2( /10):;Q3:;Q3( /10):   Q1: Walking my dog   Q1(/10): 3   Q2: playing sports   Q2( /10): 0   Q3: going to the mall    Q3( /10): 5   Lumbar Spine/SI Objective Findings   Observation Very guarded in her movements   Integumentary no concerns   Posture shifted slightly to the R. posterior pelvic tilt in standing and rounded shoulders   Gait/Locomotion grossly WFL decreased stance on R. shifted to R   Flexion ROM hands to knees good reversal of thoracic and lumbar curves but painful    Extension ROM 30% painful     Right Side Bending ROM 50% painful   Left Side Bending ROM 30%   Lumbar ROM Comment rotation to L not painful, rotation to R painful   Pelvic Screen Pain at PSIS,  ASIS not level EUGENIO positive posteriorly   Hip Screen Squat test negative, scour negative EUGENIO negative for hip but IR is more limited on R.    Hip Flexion (L2) Strength pain on R, 5/5 on L   Hip Abduction Strength 4/5   Hip Adduction Strength 4/5   Knee Flexion Strength 4/5   Knee Extension (L3) Strength 4/5   Ankle Dorsiflexion (L4) Strength 5/5   Great Toe Extension (L5) Strength 5/5   Ankle Plantar Flexion (S1) Strength 5/5   Lumbar/Hip/Knee/Foot Strength Comments 5/5   Hamstring Flexibility 45 degrees (B)   Hip Flexor Flexibility WNL   Quadricep Flexibility WNL   Lumbar/SI Flexibility Comments Pain inhibiting strength in hips (B)   SLR positive on R   Crossover SLR negative   Slump Test neg   Spring Test Tight and tender from mid thoracic through lumbar   Segmental Mobility tight at mid thoracic and lower lumbar levels   Palpation TTP at ASIS, GT(R), PSIS on  R gluteals   Planned Therapy Interventions   Planned Therapy Interventions manual therapy;neuromuscular re-education;ROM;strengthening;stretching   Planned Modality Interventions   Planned Modality Interventions Cryotherapy;Electrical stimulation   Planned Modality Interventions Comments PRN   Clinical Impression   Criteria for Skilled Therapeutic Interventions Met yes, treatment indicated   PT Diagnosis Pain in R hip/pelvis and low back , decreased WB R  decreased strength R LE and core,  decreased flexibility consistent with LBP with radicular sx.    Influenced by the following impairments pain, decreased flexibility, decreased functional strength,    Functional limitations due to impairments pain with all functional mobility and gait   Clinical Presentation Evolving/Changing   Clinical Presentation Rationale it is affecting all aspects of pt's life (work ,  home , leisure ) and invoves the R LE and hip as well as lumbar spine   Clinical Decision Making (Complexity) Moderate complexity   Therapy Frequency 2 times/Week   Predicted Duration of Therapy Intervention (days/wks) 90 days   Risk & Benefits of therapy have been explained Yes   Patient, Family & other staff in agreement with plan of care Yes   Clinical Impression Comments Pt presents with Pain in R hip/pelvis and low back , decreased WB R  decreased strength R LE and core,  decreased flexibility consistent with LBP with radicular sx.  She will benefit from skilled PT for manual techiques,and modalities as well as aquatic therapy for pain control  and to decrease muscle spasm and improve fucntional alignment.  She will also benefit from instruction in an HEP for core stabilization and strengthenign   Education Assessment   Preferred Learning Style Listening   Barriers to Learning No barriers   ORTHO GOALS   PT Ortho Eval Goals 1;2;3   Ortho Goal 1   Goal Identifier Walking dog   Goal Description Pt able to walk her dog x 15 mins with out increased back pain   Target Date 01/11/18   Ortho Goal 2   Goal Identifier Playing sports   Goal Description Pt able to walk or light jog x 5 mins in order to consider playing softball next season   Target Date 01/11/18   Ortho Goal 3   Goal Identifier Work   Goal Description Pt able to perform all of her work duties with only a 1-2 point increased pain in her back for a full shift    Target Date 01/11/18   Total Evaluation Time   Total Evaluation Time 40

## 2017-10-18 NOTE — ADDENDUM NOTE
Encounter addended by: Mirella Peña, PT on: 10/18/2017  3:01 PM<BR>     Actions taken: Sign clinical note, Flowsheet accepted

## 2017-11-01 ENCOUNTER — TELEPHONE (OUTPATIENT)
Dept: FAMILY MEDICINE | Facility: CLINIC | Age: 27
End: 2017-11-01

## 2017-11-01 ENCOUNTER — HOSPITAL ENCOUNTER (EMERGENCY)
Facility: CLINIC | Age: 27
Discharge: HOME | End: 2017-11-01
Attending: EMERGENCY MEDICINE | Admitting: EMERGENCY MEDICINE
Payer: COMMERCIAL

## 2017-11-01 VITALS
SYSTOLIC BLOOD PRESSURE: 135 MMHG | TEMPERATURE: 97.3 F | HEART RATE: 91 BPM | DIASTOLIC BLOOD PRESSURE: 100 MMHG | RESPIRATION RATE: 20 BRPM | OXYGEN SATURATION: 99 %

## 2017-11-01 DIAGNOSIS — R79.89 ELEVATED TSH: ICD-10-CM

## 2017-11-01 DIAGNOSIS — N92.0 MENORRHAGIA WITH REGULAR CYCLE: ICD-10-CM

## 2017-11-01 LAB
ALBUMIN SERPL-MCNC: 3.8 G/DL (ref 3.4–5)
ALBUMIN UR-MCNC: 30 MG/DL
ALP SERPL-CCNC: 68 U/L (ref 40–150)
ALT SERPL W P-5'-P-CCNC: 42 U/L (ref 0–50)
ANION GAP SERPL CALCULATED.3IONS-SCNC: 7 MMOL/L (ref 3–14)
APPEARANCE UR: ABNORMAL
AST SERPL W P-5'-P-CCNC: 15 U/L (ref 0–45)
BASOPHILS # BLD AUTO: 0 10E9/L (ref 0–0.2)
BASOPHILS NFR BLD AUTO: 0.3 %
BILIRUB SERPL-MCNC: 0.2 MG/DL (ref 0.2–1.3)
BILIRUB UR QL STRIP: NEGATIVE
BUN SERPL-MCNC: 12 MG/DL (ref 7–30)
CALCIUM SERPL-MCNC: 8.5 MG/DL (ref 8.5–10.1)
CHLORIDE SERPL-SCNC: 109 MMOL/L (ref 94–109)
CO2 SERPL-SCNC: 26 MMOL/L (ref 20–32)
COLOR UR AUTO: YELLOW
CREAT SERPL-MCNC: 0.61 MG/DL (ref 0.52–1.04)
DIFFERENTIAL METHOD BLD: ABNORMAL
EOSINOPHIL # BLD AUTO: 0.3 10E9/L (ref 0–0.7)
EOSINOPHIL NFR BLD AUTO: 2.4 %
ERYTHROCYTE [DISTWIDTH] IN BLOOD BY AUTOMATED COUNT: 13.4 % (ref 10–15)
GFR SERPL CREATININE-BSD FRML MDRD: >90 ML/MIN/1.7M2
GLUCOSE SERPL-MCNC: 75 MG/DL (ref 70–99)
GLUCOSE UR STRIP-MCNC: NEGATIVE MG/DL
HCG UR QL: NEGATIVE
HCT VFR BLD AUTO: 40.6 % (ref 35–47)
HGB BLD-MCNC: 13.7 G/DL (ref 11.7–15.7)
HGB UR QL STRIP: ABNORMAL
IMM GRANULOCYTES # BLD: 0.1 10E9/L (ref 0–0.4)
IMM GRANULOCYTES NFR BLD: 0.6 %
INR PPP: 0.89 (ref 0.86–1.14)
KETONES UR STRIP-MCNC: NEGATIVE MG/DL
LEUKOCYTE ESTERASE UR QL STRIP: NEGATIVE
LYMPHOCYTES # BLD AUTO: 3.3 10E9/L (ref 0.8–5.3)
LYMPHOCYTES NFR BLD AUTO: 26.9 %
MCH RBC QN AUTO: 30.6 PG (ref 26.5–33)
MCHC RBC AUTO-ENTMCNC: 33.7 G/DL (ref 31.5–36.5)
MCV RBC AUTO: 91 FL (ref 78–100)
MONOCYTES # BLD AUTO: 0.9 10E9/L (ref 0–1.3)
MONOCYTES NFR BLD AUTO: 7.7 %
MUCOUS THREADS #/AREA URNS LPF: PRESENT /LPF
NEUTROPHILS # BLD AUTO: 7.6 10E9/L (ref 1.6–8.3)
NEUTROPHILS NFR BLD AUTO: 62.1 %
NITRATE UR QL: NEGATIVE
PH UR STRIP: 7 PH (ref 5–7)
PLATELET # BLD AUTO: 306 10E9/L (ref 150–450)
POTASSIUM SERPL-SCNC: 3.5 MMOL/L (ref 3.4–5.3)
PROT SERPL-MCNC: 7.2 G/DL (ref 6.8–8.8)
RBC # BLD AUTO: 4.48 10E12/L (ref 3.8–5.2)
RBC #/AREA URNS AUTO: 36 /HPF (ref 0–2)
SODIUM SERPL-SCNC: 142 MMOL/L (ref 133–144)
SOURCE: ABNORMAL
SP GR UR STRIP: 1.02 (ref 1–1.03)
SQUAMOUS #/AREA URNS AUTO: 3 /HPF (ref 0–1)
TSH SERPL DL<=0.005 MIU/L-ACNC: 9.13 MU/L (ref 0.4–4)
UROBILINOGEN UR STRIP-MCNC: 0 MG/DL (ref 0–2)
WBC # BLD AUTO: 12.2 10E9/L (ref 4–11)
WBC #/AREA URNS AUTO: 4 /HPF (ref 0–2)
YEAST #/AREA URNS HPF: ABNORMAL /HPF

## 2017-11-01 PROCEDURE — 99283 EMERGENCY DEPT VISIT LOW MDM: CPT | Mod: Z6 | Performed by: EMERGENCY MEDICINE

## 2017-11-01 PROCEDURE — 99283 EMERGENCY DEPT VISIT LOW MDM: CPT | Mod: 25 | Performed by: EMERGENCY MEDICINE

## 2017-11-01 PROCEDURE — 25000128 H RX IP 250 OP 636: Performed by: EMERGENCY MEDICINE

## 2017-11-01 PROCEDURE — 84443 ASSAY THYROID STIM HORMONE: CPT | Performed by: EMERGENCY MEDICINE

## 2017-11-01 PROCEDURE — 81001 URINALYSIS AUTO W/SCOPE: CPT | Performed by: EMERGENCY MEDICINE

## 2017-11-01 PROCEDURE — 85610 PROTHROMBIN TIME: CPT | Performed by: EMERGENCY MEDICINE

## 2017-11-01 PROCEDURE — 96360 HYDRATION IV INFUSION INIT: CPT | Performed by: EMERGENCY MEDICINE

## 2017-11-01 PROCEDURE — 81025 URINE PREGNANCY TEST: CPT | Performed by: EMERGENCY MEDICINE

## 2017-11-01 PROCEDURE — 84439 ASSAY OF FREE THYROXINE: CPT | Performed by: EMERGENCY MEDICINE

## 2017-11-01 PROCEDURE — 85025 COMPLETE CBC W/AUTO DIFF WBC: CPT | Performed by: EMERGENCY MEDICINE

## 2017-11-01 PROCEDURE — 80053 COMPREHEN METABOLIC PANEL: CPT | Performed by: EMERGENCY MEDICINE

## 2017-11-01 RX ORDER — SODIUM CHLORIDE 9 MG/ML
1000 INJECTION, SOLUTION INTRAVENOUS CONTINUOUS
Status: DISCONTINUED | OUTPATIENT
Start: 2017-11-01 | End: 2017-11-01 | Stop reason: HOSPADM

## 2017-11-01 RX ADMIN — SODIUM CHLORIDE 1000 ML: 9 INJECTION, SOLUTION INTRAVENOUS at 19:13

## 2017-11-01 ASSESSMENT — ENCOUNTER SYMPTOMS
FEVER: 0
CHILLS: 0
LIGHT-HEADEDNESS: 1
ABDOMINAL PAIN: 1
BLOOD IN STOOL: 0
WEAKNESS: 1

## 2017-11-01 NOTE — TELEPHONE ENCOUNTER
Reason for call:  Patient reporting a symptom    Symptom or request: heavy periods. States she is going through 3-4 super tampons in a 4 hour time period. States that she had to leave work early because she bled through a tampon.     Duration (how long have symptoms been present): 10/29 or so     Have you been treated for this before? No    Additional comments:     Phone Number patient can be reached at:  Home number on file [phone] (home)    Best Time:  any    Can we leave a detailed message on this number:  YES    Call taken on 11/1/2017 at 3:24 PM by Jessica Valerio

## 2017-11-01 NOTE — ED AVS SNAPSHOT
[facility] Emergency Department    [address] [place] [address]    Phone:  [phone]    Fax:  [phone]                                       Leah Cox   MRN: [id number]    Department:  [facility] Emergency Department   Date of Visit:  11/1/2017           After Visit Summary Signature Page     I have received my discharge instructions, and my questions have been answered. I have discussed any challenges I see with this plan with the nurse or doctor.    ..........................................................................................................................................  Patient/Patient Representative Signature      ..........................................................................................................................................  Patient Representative Print Name and Relationship to Patient    ..................................................               ................................................  Date                                            Time    ..........................................................................................................................................  Reviewed by Signature/Title    ...................................................              ..............................................  Date                                                            Time

## 2017-11-01 NOTE — ED NOTES
Pt states started her period on the 29 th. Now experiencing a heavier flow than usual. Pt did admit to having a lighter than usual menstruation last month. Talked with a nurse, recommenced to be checked out.

## 2017-11-01 NOTE — ED AVS SNAPSHOT
[facility] Emergency Department    [address] [place] [address]    Phone:  [phone]    Fax:  [phone]                                       Leah Cox   MRN: [id number]    Department:  [facility] Emergency Department   Date of Visit:  11/1/2017           Patient Information     Date Of Birth          1990        Your diagnoses for this visit were:     Menorrhagia with regular cycle     Elevated TSH        You were seen by Joseluis Peraza MD.      Follow-up Information     Call Alize Ragland APRN CNP.    Why:  Get the results of your T4 as the analyzed machine was not functioning this evening    Contact information:    [address]   [place] [address] [phone]          Discharge Instructions         Heavy Menstrual Bleeding    Heavy menstrual bleeding means that your periods are heavier or longer than usual. You may soak through a pad or tampon every 1 to 3 hours on the heaviest days of your period. You may also pass large, dark clots. And your periods may last longer than 7 days.  If you have heavy periods often, this can cause a problem called anemia. With anemia, your red blood cell count is too low. Red blood cells are needed because they help carry oxygen throughout your body. Severe anemia may cause you to look pale and feel weak or tired. You might also become short of breath easily.  There are many possible causes of heavy menstrual bleeding. Hormonal imbalance is the most common cause. Having benign growths in your uterus, such as fibroids or polyps, is another cause. Taking certain medicines or having certain health problems or bleeding disorders are also causes.  To treat heavy menstrual bleeding, medicines are often tried first. If these don t help, further testing and treatments will likely be needed.  Home care  Medicines  If you re prescribed medicines, be sure to take them as directed.    To help control heavy bleeding, any of the  following may be used:    Hormone therapy (this includes all methods of hormonal birth control such as pills, shots, cream, ring, patch, or hormone-releasing IUD)    Nonsteroidal anti-inflammatory drugs (NSAIDs), such as ibuprofen    Antifibrinolytic medicines, such as transexamic acid    To help treat anemia, iron supplements may be prescribed.            General care    Get plenty of rest if you tire easily. Avoid heavy exertion.    To help relieve pain or cramping, try using a heating pad on the lower belly or back. A warm bath may also help.  Follow-up care  Follow up with your healthcare provider as directed.  When to seek medical advice  Call your healthcare provider right away if any of these occur:    Heavier bleeding (soaking 1 pad or tampon every hour for 3 hours)    Heavy bleeding that lasts longer than 1 week    Fever of 100.4 F (38 C) or higher, or as directed by your provider    Pain or cramping that gets worse instead of better    Signs of anemia such as pale skin, extreme fatigue or weakness, or shortness of breath    Dizziness or fainting  Date Last Reviewed: 6/11/2015 2000-2017 [employer]. [address] [place] [address]. All rights reserved. This information is not intended as a substitute for professional medical care. Always follow your healthcare professional's instructions.          Discharge References/Attachments     HYPOTHYROIDISM (ENGLISH)      24 Hour Appointment Hotline       To make an appointment at any [facility] clinic, call [phone] ([phone]). If you don't have a family doctor or clinic, we will help you find one. [facility] clinics are conveniently located to serve the needs of you and your family.             Review of your medicines      Our records show that you are taking the medicines listed below. If these are incorrect, please call your family doctor or clinic.        Dose / Directions Last dose taken    citalopram 20 MG tablet   Commonly  "known as:  celeXA   Quantity:  30 tablet        Take 1/2 tablet (10 mg) for 1-2 weeks, then increase to 1 tablet orally daily   Refills:  0        cyclobenzaprine 5 MG tablet   Commonly known as:  FLEXERIL   Quantity:  30 tablet        Take 1-2 tabs as needed at bedtime for pain interrupting sleep   Refills:  0                Procedures and tests performed during your visit     CBC with platelets differential    Comprehensive metabolic panel    HCG qualitative urine    INR    T4 free    TSH with free T4 reflex    UA reflex to Microscopic      Orders Needing Specimen Collection     None      Pending Results     Date and Time Order Name Status Description    11/1/2017 1905 T4 free In process             Pending Culture Results     No orders found from 10/30/2017 to 11/2/2017.            Pending Results Instructions     If you had any lab results that were not finalized at the time of your Discharge, you can call the ED Lab Result RN at [phone]. You will be contacted by this team for any positive Lab results or changes in treatment. The nurses are available 7 days a week from 10A to 6:30P.  You can leave a message 24 hours per day and they will return your call.        Thank you for choosing [facility]       Thank you for choosing [facility] for your care. Our goal is always to provide you with excellent care. Hearing back from our patients is one way we can continue to improve our services. Please take a few minutes to complete the written survey that you may receive in the mail after you visit with us. Thank you!        [employer] Information     [employer] lets you send messages to your doctor, view your test results, renew your prescriptions, schedule appointments and more. To sign up, go to [email or web address] . Click on \"Log in\" on the left side of the screen, which will take you to the Welcome page. Then click on \"Sign up Now\" on the right side of the page.     You will be asked to enter the access code listed " below, as well as some personal information. Please follow the directions to create your username and password.     Your access code is: [email or web address]  Expires: 2018  8:45 PM     Your access code will  in 90 days. If you need help or a new code, please call your [facility] clinic or [phone].        Care EveryWhere ID     This is your Care EveryWhere ID. This could be used by other organizations to access your [facility] medical records  [id number]        Equal Access to Services     [facility] : Anahy Garcia, wamichael lujose, qatsering kaalnicholas gentile, maday good . So [facility] [phone].    ATENCIÓN: Si habla español, tiene a varghese disposición servicios gratuitos de asistencia lingüística. Llame al [phone].    We comply with applicable federal civil rights laws and [place] laws. We do not discriminate on the basis of race, color, national origin, age, disability, sex, sexual orientation, or gender identity.            After Visit Summary       This is your record. Keep this with you and show to your community pharmacist(s) and doctor(s) at your next visit.

## 2017-11-01 NOTE — TELEPHONE ENCOUNTER
Leah Cox is a 27 year old female who calls with heavy vaginal bleeding.    NURSING ASSESSMENT:  Description:  Patient is reporting she has been using a Super tampon almost every hour. She states she was dizzy today at work, but unsure if that was from taking an energy drink, or from the bleeding.  She states she has had heavy bleeding and clotting in the past, but not like this.  She had to leave work early today due to leaking through a tampon and it just happened again just now.  She is reporting this is unusually heavy bleeding for her.  She is informed she will need to go to the ED for further evaluation.  She states she does not know if she wants to go to the ED tonight as it may cost too much $.  She is informed of the reasons she should go in, but states she will call back tomorrow is she does not go to the ED to see if she can be worked in.    Onset/duration:  10/29/17  Precip. factors:  none  Associated symptoms:  See above  Improves/worsens symptoms:  same  Pain scale (0-10)   0/10 - did not rate  Last exam/Treatment:  3/14/17  Allergies: No Known Allergies      NURSING PLAN: Nursing advice to patient to ED for further evaluation    RECOMMENDED DISPOSITION:  To ED, another person to drive   Will comply with recommendation: She states she will think about it and call tomorrow if she does not go tonight.  If further questions/concerns or if symptoms do not improve, worsen or new symptoms develop, call your PCP or [facility] as soon as possible.      Guideline used:  Vaginal Bleeding  Telephone Triage Protocols for Nurses, Fifth Edition, Jane Calix RN

## 2017-11-01 NOTE — LETTER
To Whom it may concern:      Leah Cox was seen in our Emergency Department today, 11/01/17.  I expect her condition to improve over the next 2-3 days.  She may return to work when improved.    Sincerely,        Joseluisdavid Peraza MD

## 2017-11-02 ENCOUNTER — OFFICE VISIT (OUTPATIENT)
Dept: FAMILY MEDICINE | Facility: CLINIC | Age: 27
End: 2017-11-02
Payer: COMMERCIAL

## 2017-11-02 VITALS
HEART RATE: 80 BPM | SYSTOLIC BLOOD PRESSURE: 110 MMHG | TEMPERATURE: 98.3 F | WEIGHT: 191.2 LBS | BODY MASS INDEX: 33.34 KG/M2 | DIASTOLIC BLOOD PRESSURE: 80 MMHG

## 2017-11-02 DIAGNOSIS — N92.0 EXCESSIVE OR FREQUENT MENSTRUATION: Primary | ICD-10-CM

## 2017-11-02 DIAGNOSIS — E03.9 HYPOTHYROIDISM, UNSPECIFIED TYPE: ICD-10-CM

## 2017-11-02 LAB — T4 FREE SERPL-MCNC: 0.8 NG/DL (ref 0.76–1.46)

## 2017-11-02 PROCEDURE — 87591 N.GONORRHOEAE DNA AMP PROB: CPT | Performed by: NURSE PRACTITIONER

## 2017-11-02 PROCEDURE — 99214 OFFICE O/P EST MOD 30 MIN: CPT | Performed by: NURSE PRACTITIONER

## 2017-11-02 PROCEDURE — 87491 CHLMYD TRACH DNA AMP PROBE: CPT | Performed by: NURSE PRACTITIONER

## 2017-11-02 RX ORDER — MEDROXYPROGESTERONE ACETATE 10 MG
10 TABLET ORAL DAILY
Qty: 10 TABLET | Refills: 0 | Status: SHIPPED | OUTPATIENT
Start: 2017-11-02 | End: 2017-11-12

## 2017-11-02 RX ORDER — LEVOTHYROXINE SODIUM 75 UG/1
75 TABLET ORAL DAILY
Qty: 60 TABLET | Refills: 0 | Status: SHIPPED | OUTPATIENT
Start: 2017-11-02 | End: 2017-12-27

## 2017-11-02 NOTE — DISCHARGE INSTRUCTIONS
Heavy Menstrual Bleeding    Heavy menstrual bleeding means that your periods are heavier or longer than usual. You may soak through a pad or tampon every 1 to 3 hours on the heaviest days of your period. You may also pass large, dark clots. And your periods may last longer than 7 days.  If you have heavy periods often, this can cause a problem called anemia. With anemia, your red blood cell count is too low. Red blood cells are needed because they help carry oxygen throughout your body. Severe anemia may cause you to look pale and feel weak or tired. You might also become short of breath easily.  There are many possible causes of heavy menstrual bleeding. Hormonal imbalance is the most common cause. Having benign growths in your uterus, such as fibroids or polyps, is another cause. Taking certain medicines or having certain health problems or bleeding disorders are also causes.  To treat heavy menstrual bleeding, medicines are often tried first. If these don t help, further testing and treatments will likely be needed.  Home care  Medicines  If you re prescribed medicines, be sure to take them as directed.    To help control heavy bleeding, any of the following may be used:    Hormone therapy (this includes all methods of hormonal birth control such as pills, shots, cream, ring, patch, or hormone-releasing IUD)    Nonsteroidal anti-inflammatory drugs (NSAIDs), such as ibuprofen    Antifibrinolytic medicines, such as transexamic acid    To help treat anemia, iron supplements may be prescribed.            General care    Get plenty of rest if you tire easily. Avoid heavy exertion.    To help relieve pain or cramping, try using a heating pad on the lower belly or back. A warm bath may also help.  Follow-up care  Follow up with your healthcare provider as directed.  When to seek medical advice  Call your healthcare provider right away if any of these occur:    Heavier bleeding (soaking 1 pad or tampon every hour for 3  hours)    Heavy bleeding that lasts longer than 1 week    Fever of 100.4 F (38 C) or higher, or as directed by your provider    Pain or cramping that gets worse instead of better    Signs of anemia such as pale skin, extreme fatigue or weakness, or shortness of breath    Dizziness or fainting  Date Last Reviewed: 6/11/2015 2000-2017 [employer]. [address] [place] [address]. All rights reserved. This information is not intended as a substitute for professional medical care. Always follow your healthcare professional's instructions.

## 2017-11-02 NOTE — PROGRESS NOTES
SUBJECTIVE:   Leah Cox is a 27 year old female who presents to clinic today for the following health issues:      ED/UC Followup:    Facility:  [facility]  Date of visit: 11/1/17  Reason for visit: vaginal bleeding  Current Status: about the same, still bleeding as heavy as yesterday, blomarbin Lynn states her period started on 10/29 which was normal time for her monthly period. It has been excessively heavy area and she has been saturating tampons every 1-1/2 to 2 1/2  hours. The bleeding is bright red, no clots. She's not having a lot of cramping, but is tender across the lower abdomen. She was seen in the ED last night. Hemoglobin normal, pregnancy test negative. Normal urinalysis,, normal INR. She did have a TSH drawn which was elevated, the 4 returns today and is within the lower range of normal. She has no known history of type hypothyroidism. She did have unprotected intercourse with a new partner about 2 weeks prior to onset of bleeding; she denies history of trauma..     Problem list and histories reviewed & adjusted, as indicated.  Additional history: as documented    BP Readings from Last 3 Encounters:   11/02/17 110/80   11/01/17 (!) 135/100   10/02/17 122/84    Wt Readings from Last 3 Encounters:   11/02/17 191 lb 3.2 oz (86.7 kg)   10/02/17 194 lb (88 kg)   07/24/17 194 lb (88 kg)                      Reviewed and updated as needed this visit by clinical staffTobacco  Allergies  Meds  Med Hx  Surg Hx  Fam Hx  Soc Hx      Reviewed and updated as needed this visit by Provider         ROS:  Constitutional, HEENT, cardiovascular, pulmonary, gi and gu systems are negative, except as otherwise noted.      OBJECTIVE:   /80  Pulse 80  Temp 98.3  F (36.8  C) (Tympanic)  Wt 191 lb 3.2 oz (86.7 kg)  BMI 33.34 kg/m2  Body mass index is 33.34 kg/(m^2).   GENERAL: healthy, alert and no distress  NECK: no adenopathy, no asymmetry, masses, or scars and thyroid normal to palpation  RESP:  lungs clear to auscultation - no rales, rhonchi or wheezes  CV: Rate and rhythm regular S1 and S2 without murmur  ABDOMEN: soft, nontender, no hepatosplenomegaly, no masses and bowel sounds normal   (female): Normal external genitalia and BUS. Moderate amount of bright bleeding from the cervical os pooling in the vaginal vault. No vaginal or cervical lesions. No clotting. No cervical motion tenderness. Patient reports some discomfort with bimanual exam, no palpable abnormality.    Labs from last night:     Diagnostic Test Results:  Results for orders placed or performed during the hospital encounter of 11/01/17 (from the past 24 hour(s))   UA reflex to Microscopic   Result Value Ref Range    Color Urine Yellow     Appearance Urine Cloudy     Glucose Urine Negative NEG^Negative mg/dL    Bilirubin Urine Negative NEG^Negative    Ketones Urine Negative NEG^Negative mg/dL    Specific Gravity Urine 1.021 1.003 - 1.035    Blood Urine Moderate (A) NEG^Negative    pH Urine 7.0 5.0 - 7.0 pH    Protein Albumin Urine 30 (A) NEG^Negative mg/dL    Urobilinogen mg/dL 0.0 0.0 - 2.0 mg/dL    Nitrite Urine Negative NEG^Negative    Leukocyte Esterase Urine Negative NEG^Negative    Source Unspecified Urine     RBC Urine 36 (H) 0 - 2 /HPF    WBC Urine 4 (H) 0 - 2 /HPF    Yeast Urine Few (A) NEG^Negative /HPF    Squamous Epithelial /HPF Urine 3 (H) 0 - 1 /HPF    Mucous Urine Present (A) NEG^Negative /LPF   HCG qualitative urine   Result Value Ref Range    HCG Qual Urine Negative NEG^Negative   CBC with platelets differential   Result Value Ref Range    WBC 12.2 (H) 4.0 - 11.0 10e9/L    RBC Count 4.48 3.8 - 5.2 10e12/L    Hemoglobin 13.7 11.7 - 15.7 g/dL    Hematocrit 40.6 35.0 - 47.0 %    MCV 91 78 - 100 fl    MCH 30.6 26.5 - 33.0 pg    MCHC 33.7 31.5 - 36.5 g/dL    RDW 13.4 10.0 - 15.0 %    Platelet Count 306 150 - 450 10e9/L    Diff Method Automated Method     % Neutrophils 62.1 %    % Lymphocytes 26.9 %    % Monocytes 7.7 %    %  Eosinophils 2.4 %    % Basophils 0.3 %    % Immature Granulocytes 0.6 %    Absolute Neutrophil 7.6 1.6 - 8.3 10e9/L    Absolute Lymphocytes 3.3 0.8 - 5.3 10e9/L    Absolute Monocytes 0.9 0.0 - 1.3 10e9/L    Absolute Eosinophils 0.3 0.0 - 0.7 10e9/L    Absolute Basophils 0.0 0.0 - 0.2 10e9/L    Abs Immature Granulocytes 0.1 0 - 0.4 10e9/L   INR   Result Value Ref Range    INR 0.89 0.86 - 1.14   Comprehensive metabolic panel   Result Value Ref Range    Sodium 142 133 - 144 mmol/L    Potassium 3.5 3.4 - 5.3 mmol/L    Chloride 109 94 - 109 mmol/L    Carbon Dioxide 26 20 - 32 mmol/L    Anion Gap 7 3 - 14 mmol/L    Glucose 75 70 - 99 mg/dL    Urea Nitrogen 12 7 - 30 mg/dL    Creatinine 0.61 0.52 - 1.04 mg/dL    GFR Estimate >90 >60 mL/min/1.7m2    GFR Estimate If Black >90 >60 mL/min/1.7m2    Calcium 8.5 8.5 - 10.1 mg/dL    Bilirubin Total 0.2 0.2 - 1.3 mg/dL    Albumin 3.8 3.4 - 5.0 g/dL    Protein Total 7.2 6.8 - 8.8 g/dL    Alkaline Phosphatase 68 40 - 150 U/L    ALT 42 0 - 50 U/L    AST 15 0 - 45 U/L   TSH with free T4 reflex   Result Value Ref Range    TSH 9.13 (H) 0.40 - 4.00 mU/L   T4 free   Result Value Ref Range    T4 Free 0.80 0.76 - 1.46 ng/dL       ASSESSMENT/PLAN:     Problem List Items Addressed This Visit        Medium    Excessive or frequent menstruation - Primary    Relevant Medications    medroxyPROGESTERone (PROVERA) 10 MG tablet    Other Relevant Orders    NEISSERIA GONORRHOEA PCR    CHLAMYDIA TRACHOMATIS PCR    **TSH with free T4 reflex FUTURE 2mo      Other Visit Diagnoses     Hypothyroidism, unspecified type        Relevant Medications    levothyroxine (SYNTHROID/LEVOTHROID) 75 MCG tablet         Provera 10 mg 1 tablet daily for 10 days. The purpose of medications explained to patient. She is aware she may need a little heavier for 2 days, then bleeding should subsequently sees  Levothyroxine 75  g 1 tablet daily. Instructed to take this by itself in the morning on an empty stomach. Medication and  its action were explained to the patient  She'll follow up in clinic next week. We'll schedule her for pelvic ultrasound once we get the bleeding stopped    NADJA Pierce CNP  [facility]

## 2017-11-02 NOTE — MR AVS SNAPSHOT
After Visit Summary   11/2/2017    Leah Cox    MRN: [id number]           Patient Information     Date Of Birth          1990        Visit Information        Provider Department      11/2/2017 4:15 PM Alize Ragland APRN CNP [facility]        Today's Diagnoses     Excessive or frequent menstruation    -  1    Hypothyroidism, unspecified type           Follow-ups after your visit        Your next 10 appointments already scheduled     Nov 10, 2017  2:00 PM CST   Office Visit with NADJA Pierce CNP   [facility]    [address] [place] [id number] [phone]           Bring a current list of meds and any records pertaining to this visit. For Physicals, please bring immunization records and any forms needing to be filled out. Please arrive 10 minutes early to complete paperwork.              Future tests that were ordered for you today     Open Future Orders        Priority Expected Expires Ordered    **TSH with free T4 reflex FUTURE 2mo Routine 1/1/2018 3/2/2018 11/2/2017            Who to contact     If you have questions or need follow up information about today's clinic visit or your schedule please contact [facility] directly at [phone].  Normal or non-critical lab and imaging results will be communicated to you by MyChart, letter or phone within 4 business days after the clinic has received the results. If you do not hear from us within 7 days, please contact the clinic through [email or web address] or phone. If you have a critical or abnormal lab result, we will notify you by phone as soon as possible.  Submit refill requests through [employer] or call your pharmacy and they will forward the refill request to us. Please allow 3 business days for your refill to be completed.          Additional Information About Your Visit        [email or web address] Information     [employer] lets you send messages to your  "doctor, view your test results, renew your prescriptions, schedule appointments and more. To sign up, go to [email or web address] . Click on \"Log in\" on the left side of the screen, which will take you to the Welcome page. Then click on \"Sign up Now\" on the right side of the page.     You will be asked to enter the access code listed below, as well as some personal information. Please follow the directions to create your username and password.     Your access code is: [email or web address]  Expires: 2018  8:45 PM     Your access code will  in 90 days. If you need help or a new code, please call your [place] clinic or [phone].        Care EveryWhere ID     This is your Care EveryWhere ID. This could be used by other organizations to access your [place] medical records  [id number]        Your Vitals Were     Pulse Temperature BMI (Body Mass Index)             80 98.3  F (36.8  C) (Tympanic) 33.34 kg/m2          Blood Pressure from Last 3 Encounters:   17 110/80   17 (!) 135/100   10/02/17 122/84    Weight from Last 3 Encounters:   17 191 lb 3.2 oz (86.7 kg)   10/02/17 194 lb (88 kg)   17 194 lb (88 kg)              We Performed the Following     CHLAMYDIA TRACHOMATIS PCR     NEISSERIA GONORRHOEA PCR          Today's Medication Changes          These changes are accurate as of: 17  5:04 PM.  If you have any questions, ask your nurse or doctor.               Start taking these medicines.        Dose/Directions    levothyroxine 75 MCG tablet   Commonly known as:  SYNTHROID/LEVOTHROID   Used for:  Hypothyroidism, unspecified type   Started by:  Alize Ragland APRN CNP        Dose:  75 mcg   Take 1 tablet (75 mcg) by mouth daily   Quantity:  60 tablet   Refills:  0       medroxyPROGESTERone 10 MG tablet   Commonly known as:  PROVERA   Used for:  Excessive or frequent menstruation   Started by:  Ailze Ragland APRN CNP        Dose:  10 mg   Take 1 tablet (10 mg) by mouth " daily for 10 days   Quantity:  10 tablet   Refills:  0            Where to get your medicines      These medications were sent to [facility], [place] - [address]  [address] [facility] [address]     Phone:  [phone]     levothyroxine 75 MCG tablet    medroxyPROGESTERone 10 MG tablet                Primary Care Provider Office Phone # Fax #    Alize Ragland, APRN [facility] [phone]       [address]   [facility] [address]        Equal Access to Services     [facility]: Hadii aad ku hadasho Soomaali, waaxda luqadaha, qaybta kaalmada adeegyada, waxay idiin hayaan adeeg kharaleanna good . So [facility] [phone].    ATENCIÓN: Si habla español, tiene a varghese disposición servicios gratuitos de asistencia lingüística. LlKing's [facility] [phone].    We comply with applicable federal civil rights laws and [place] laws. We do not discriminate on the basis of race, color, national origin, age, disability, sex, sexual orientation, or gender identity.            Thank you!     Thank you for choosing [facility]  for your care. Our goal is always to provide you with excellent care. Hearing back from our patients is one way we can continue to improve our services. Please take a few minutes to complete the written survey that you may receive in the mail after your visit with us. Thank you!             Your Updated Medication List - Protect others around you: Learn how to safely use, store and throw away your medicines at www.disposemymeds.org.          This list is accurate as of: 11/2/17  5:04 PM.  Always use your most recent med list.                   Brand Name Dispense Instructions for use Diagnosis    citalopram 20 MG tablet    celeXA    30 tablet    Take 1/2 tablet (10 mg) for 1-2 weeks, then increase to 1 tablet orally daily    Adjustment disorder with depressed mood       cyclobenzaprine 5 MG tablet    FLEXERIL    30 tablet    Take 1-2 tabs as needed at  bedtime for pain interrupting sleep    Chronic bilateral low back pain with right-sided sciatica, Myalgia       levothyroxine 75 MCG tablet    SYNTHROID/LEVOTHROID    60 tablet    Take 1 tablet (75 mcg) by mouth daily    Hypothyroidism, unspecified type       medroxyPROGESTERone 10 MG tablet    PROVERA    10 tablet    Take 1 tablet (10 mg) by mouth daily for 10 days    Excessive or frequent menstruation

## 2017-11-02 NOTE — ED PROVIDER NOTES
"  History     Chief Complaint   Patient presents with     Vaginal Bleeding     The history is provided by the patient.     Leah Cox is a 27 year old female who presents to the emergency department with concerns of vaginal bleeding. The patient states that the heavy bleeding began on 10/30 and is not her usual period. This morning, she woke up at 03:00 and says, \"it looked like a massacre in my bed\".  She normally uses regular size tampons but today has gone through 4-5 super tampons today. She has abdominal cramping and pelvic pain. She feels weak and light-headed. The patient denies having a fever, chills, blood in her stool or in her gums when brushing her teeth.     Problem List:    Patient Active Problem List    Diagnosis Date Noted     Ganglion cyst of dorsum of right wrist 07/24/2017     Priority: Medium     Metacarpal boss 07/24/2017     Priority: Medium     Excessive or frequent menstruation 03/14/2017     Priority: Medium     Adjustment disorder with depressed mood 03/03/2017     Priority: Medium     Carpal tunnel syndrome of right wrist 10/18/2016     Priority: Medium     Irregular periods 12/08/2015     Priority: Medium     Pelvic pain in female 12/08/2015     Priority: Medium        Past Medical History:    No past medical history on file.    Past Surgical History:    Past Surgical History:   Procedure Laterality Date     LEEP TX, CERVICAL  2009     RELEASE CARPAL TUNNEL Right 10/5/2016    Procedure: RELEASE CARPAL TUNNEL;  Surgeon: Christian Sebastian MD;  Location:  OR       Family History:    No family history on file.    Social History:  Marital Status:  Single [1]  Social History   Substance Use Topics     Smoking status: Current Every Day Smoker     Packs/day: 0.25     Smokeless tobacco: Never Used     Alcohol use 0.0 oz/week     0 Standard drinks or equivalent per week      Comment: occ.        Medications:      cyclobenzaprine (FLEXERIL) 5 MG tablet   citalopram (CELEXA) 20 MG tablet "         Review of Systems   Constitutional: Negative for chills and fever.   Gastrointestinal: Positive for abdominal pain. Negative for blood in stool.   Genitourinary: Positive for pelvic pain and vaginal bleeding.   Neurological: Positive for weakness and light-headedness.   All other systems reviewed and are negative.      Physical Exam   BP: (!) 135/100  Pulse: 91  Temp: 97.3  F (36.3  C)  Resp: 20  SpO2: 99 %      Physical Exam   Constitutional: She is oriented to person, place, and time. She appears well-developed and well-nourished.   HENT:   Head: Atraumatic.   Eyes: EOM are normal.   Neck: Neck supple.   Cardiovascular: Normal rate, regular rhythm and normal heart sounds.    Pulmonary/Chest: She has wheezes.   Abdominal: Soft. Bowel sounds are normal. There is no tenderness.   Patient deferred pelvic due to her vaginal bleeding   Musculoskeletal: Normal range of motion.   Neurological: She is alert and oriented to person, place, and time.   Skin: Skin is warm and dry.   Psychiatric: She has a normal mood and affect. Her behavior is normal.   Nursing note and vitals reviewed.      ED Course     ED Course     Procedures               Critical Care time:  none          Results for orders placed or performed during the hospital encounter of 11/01/17 (from the past 24 hour(s))   UA reflex to Microscopic   Result Value Ref Range    Color Urine Yellow     Appearance Urine Cloudy     Glucose Urine Negative NEG^Negative mg/dL    Bilirubin Urine Negative NEG^Negative    Ketones Urine Negative NEG^Negative mg/dL    Specific Gravity Urine 1.021 1.003 - 1.035    Blood Urine Moderate (A) NEG^Negative    pH Urine 7.0 5.0 - 7.0 pH    Protein Albumin Urine 30 (A) NEG^Negative mg/dL    Urobilinogen mg/dL 0.0 0.0 - 2.0 mg/dL    Nitrite Urine Negative NEG^Negative    Leukocyte Esterase Urine Negative NEG^Negative    Source Unspecified Urine     RBC Urine 36 (H) 0 - 2 /HPF    WBC Urine 4 (H) 0 - 2 /HPF    Yeast Urine Few (A)  NEG^Negative /HPF    Squamous Epithelial /HPF Urine 3 (H) 0 - 1 /HPF    Mucous Urine Present (A) NEG^Negative /LPF   HCG qualitative urine   Result Value Ref Range    HCG Qual Urine Negative NEG^Negative   CBC with platelets differential   Result Value Ref Range    WBC 12.2 (H) 4.0 - 11.0 10e9/L    RBC Count 4.48 3.8 - 5.2 10e12/L    Hemoglobin 13.7 11.7 - 15.7 g/dL    Hematocrit 40.6 35.0 - 47.0 %    MCV 91 78 - 100 fl    MCH 30.6 26.5 - 33.0 pg    MCHC 33.7 31.5 - 36.5 g/dL    RDW 13.4 10.0 - 15.0 %    Platelet Count 306 150 - 450 10e9/L    Diff Method Automated Method     % Neutrophils 62.1 %    % Lymphocytes 26.9 %    % Monocytes 7.7 %    % Eosinophils 2.4 %    % Basophils 0.3 %    % Immature Granulocytes 0.6 %    Absolute Neutrophil 7.6 1.6 - 8.3 10e9/L    Absolute Lymphocytes 3.3 0.8 - 5.3 10e9/L    Absolute Monocytes 0.9 0.0 - 1.3 10e9/L    Absolute Eosinophils 0.3 0.0 - 0.7 10e9/L    Absolute Basophils 0.0 0.0 - 0.2 10e9/L    Abs Immature Granulocytes 0.1 0 - 0.4 10e9/L   INR   Result Value Ref Range    INR 0.89 0.86 - 1.14   Comprehensive metabolic panel   Result Value Ref Range    Sodium 142 133 - 144 mmol/L    Potassium 3.5 3.4 - 5.3 mmol/L    Chloride 109 94 - 109 mmol/L    Carbon Dioxide 26 20 - 32 mmol/L    Anion Gap 7 3 - 14 mmol/L    Glucose 75 70 - 99 mg/dL    Urea Nitrogen 12 7 - 30 mg/dL    Creatinine 0.61 0.52 - 1.04 mg/dL    GFR Estimate >90 >60 mL/min/1.7m2    GFR Estimate If Black >90 >60 mL/min/1.7m2    Calcium 8.5 8.5 - 10.1 mg/dL    Bilirubin Total 0.2 0.2 - 1.3 mg/dL    Albumin 3.8 3.4 - 5.0 g/dL    Protein Total 7.2 6.8 - 8.8 g/dL    Alkaline Phosphatase 68 40 - 150 U/L    ALT 42 0 - 50 U/L    AST 15 0 - 45 U/L   TSH with free T4 reflex   Result Value Ref Range    TSH 9.13 (H) 0.40 - 4.00 mU/L     Medications   0.9% sodium chloride BOLUS (0 mLs Intravenous Stopped 11/1/17 2031)     Followed by   0.9% sodium chloride infusion (not administered)     Assessments & Plan (with Medical Decision  Making)   Patient is a 27-year-old female presents with heavy menstrual bleeding.  Patient states that her last period was lighter than usual.  She has had a previous history of heavy menses.  States that her period is at the regular time but heavier flow than usual.  She has cramps that are consistent with her previous periods. Denies any other bleeding issues such as bruising, bloody emesis or dark/bloody stools.  Denies fever or chills.  She has been sexually active but does not think she is pregnant.  Exam patient was afebrile and vital signs are stable.  She is not tachycardic or hypotensive.  Urinalysis was unremarkable except for red cells in it to be discharged to her period.  Her pregnancy test was negative.  Hemoglobin was 13.7 with normal platelets and an INR.  No liver abnormalities.  Her TSH was elevated at 9.13.  This may indicate hypothyroidism but unfortunately the analyzer was down so we T4 could not be run.  Hopefully this will be available by tomorrow.  Question if this is causing her menorrhagia.  If the T4 is normal consider further evaluation such as pelvic ultrasound.  If abnormal and consistent with hypothyroidism may need replacement.  She will contact her doctor tomorrow to try to get those results.  In the meantime she is given handouts on menorrhagia and hypothyroidism.  She is given a work excuse.  Reasons to return to the emergency room were discussed.  I have reviewed the nursing notes.    I have reviewed the findings, diagnosis, plan and need for follow up with the patient.       Discharge Medication List as of 11/1/2017  8:45 PM          Final diagnoses:   Menorrhagia with regular cycle   Elevated TSH     This document serves as a record of services personally performed by Joseluis Peraza MD. It was created on their behalf by Mag Wills, a trained medical scribe. The creation of this record is based on the provider's personal observations and the statements of the patient. This  document has been checked and approved by the attending provider.    Note: Chart documentation done in part with Dragon Voice Recognition software. Although reviewed after completion, some word and grammatical errors may remain.    11/1/2017   [facility] EMERGENCY DEPARTMENT     Joseluis Peraza MD  11/01/17 2051       Joseluis Peraza MD  11/01/17 2116

## 2017-11-03 LAB
C TRACH DNA SPEC QL NAA+PROBE: NEGATIVE
N GONORRHOEA DNA SPEC QL NAA+PROBE: NEGATIVE
SPECIMEN SOURCE: NORMAL
SPECIMEN SOURCE: NORMAL

## 2017-11-10 ENCOUNTER — TELEPHONE (OUTPATIENT)
Dept: FAMILY MEDICINE | Facility: CLINIC | Age: 27
End: 2017-11-10

## 2017-11-10 NOTE — TELEPHONE ENCOUNTER
Next Tuesday will be fine. She still has 2 days left of Provera, I am not concerned that she is still doing some bleeding. Would anticipate bleeding to stop shortly after completing the entire course of Provera.

## 2017-11-10 NOTE — TELEPHONE ENCOUNTER
Patient is calling to report she is unable to get a ride to the clinic at 2:00 today.  She is only able to make an appointment at 2:30ish.  She states she can come in on Tuesday if that would work.  She states she has been having clotting that started at day 2-3 of taking the PROVERA.  The largest clot size has been about the size of her pinky finger.  She is reporting to have 2-3 clots like this per day.  She is still having bleeding, but it has reduced from going through a tampon every 1-2 hours to going through a tampon every 3-4 hours.      She is informed this message will be sent to PCP to decide a plan. She is informed she will get a call back about a plan and if she can be seen next week on Tuesday for this.  Routing to PCP for further advice.    Shabnam Calix RN

## 2017-11-10 NOTE — TELEPHONE ENCOUNTER
Patient is called and informed of this message.  Patient understands and agrees to this plan.  Appointment is changed.  Closing this encounter.    Shabnam Calix RN

## 2017-11-14 ENCOUNTER — OFFICE VISIT (OUTPATIENT)
Dept: FAMILY MEDICINE | Facility: CLINIC | Age: 27
End: 2017-11-14
Payer: COMMERCIAL

## 2017-11-14 VITALS
TEMPERATURE: 98.6 F | HEART RATE: 80 BPM | WEIGHT: 190.8 LBS | DIASTOLIC BLOOD PRESSURE: 80 MMHG | BODY MASS INDEX: 33.27 KG/M2 | SYSTOLIC BLOOD PRESSURE: 112 MMHG

## 2017-11-14 DIAGNOSIS — N92.0 MENORRHAGIA WITH REGULAR CYCLE: Primary | ICD-10-CM

## 2017-11-14 PROCEDURE — 99214 OFFICE O/P EST MOD 30 MIN: CPT | Performed by: NURSE PRACTITIONER

## 2017-11-14 NOTE — MR AVS SNAPSHOT
After Visit Summary   11/14/2017    Leah Cox    MRN: [id number]           Patient Information     Date Of Birth          1990        Visit Information        Provider Department      11/14/2017 2:45 PM Alize Ragland APRN [facility]        Today's Diagnoses     Menorrhagia with regular cycle    -  1       Follow-ups after your visit        Additional Services     OB/GYN REFERRAL       Your provider has referred you to:  [facility]: [facility] [phone]   [email or web address]     Please be aware that coverage of these services is subject to the terms and limitations of your health insurance plan.  Call member services at your health plan with any benefit or coverage questions.      Please bring the following with you to your appointment:    (1) Any X-Rays, CTs or MRIs which have been performed.  Contact the facility where they were done to arrange for  prior to your scheduled appointment.   (2) List of current medications   (3) This referral request   (4) Any documents/labs given to you for this referral                  Your next 10 appointments already scheduled     Nov 15, 2017  3:15 PM CST   US PELVIC COMPLETE W TRANSVAGINAL with PHUS1   [facility]ound ([facility])    [address] [place] [id number] [phone]           Please bring a list of your medicines (including vitamins, minerals and over-the-counter drugs). Also, tell your doctor about any allergies you may have. Wear comfortable clothes and leave your valuables at home.  Adults: Drink six 8-ounce glasses of fluid one hour before your exam. Do NOT empty your bladder.  If you need to empty your bladder before your exam, try to release only a little bit of urine. Then, drink another 8oz glass of fluid.  Children: Children who are potty trained should drink at least 4 cups (32 oz) of  "liquid 45 minutes to one hour prior to the exam. The child s bladder must be full in order to achieve a diagnostic exam. If your child is very uncomfortable or has an urgent need to pee, please notify a technologist; they will try to find out how much longer the wait may be and provide instructions to help relieve the pressure. Occasionally it is medically necessary to insert a urinary catheter to fill the bladder.  Please call the Imaging Department at your exam site with any questions.              Future tests that were ordered for you today     Open Future Orders        Priority Expected Expires Ordered    US Pelvic Complete w Transvaginal Routine  11/14/2018 11/14/2017            Who to contact     If you have questions or need follow up information about today's clinic visit or your schedule please contact [facility] directly at [phone].  Normal or non-critical lab and imaging results will be communicated to you by [employer], letter or phone within 4 business days after the clinic has received the results. If you do not hear from us within 7 days, please contact the clinic through [employer] or phone. If you have a critical or abnormal lab result, we will notify you by phone as soon as possible.  Submit refill requests through [employer] or call your pharmacy and they will forward the refill request to us. Please allow 3 business days for your refill to be completed.          Additional Information About Your Visit        [employer] Information     [employer] lets you send messages to your doctor, view your test results, renew your prescriptions, schedule appointments and more. To sign up, go to [email or web address] . Click on \"Log in\" on the left side of the screen, which will take you to the Welcome page. Then click on \"Sign up Now\" on the right side of the page.     You will be asked to enter the access code listed below, as well as some personal information. Please follow the directions to " create your username and password.     Your access code is: [email or web address]  Expires: 2018  7:45 PM     Your access code will  in 90 days. If you need help or a new code, please call your [facility] clinic or [phone].        Care EveryWhere ID     This is your Care EveryWhere ID. This could be used by other organizations to access your [facility] medical records  [id number]        Your Vitals Were     Pulse Temperature BMI (Body Mass Index)             80 98.6  F (37  C) (Tympanic) 33.27 kg/m2          Blood Pressure from Last 3 Encounters:   17 112/80   17 110/80   17 (!) 135/100    Weight from Last 3 Encounters:   17 190 lb 12.8 oz (86.5 kg)   17 191 lb 3.2 oz (86.7 kg)   10/02/17 194 lb (88 kg)              We Performed the Following     OB/GYN REFERRAL        Primary Care Provider Office Phone # Fax #    Alize Ragland, APRN [facility] [phone] [address] [place] [address]        Equal Access to Services     LUCIE SALCIDO AH: Hadii ketan ku luizo Sosreekanth, waaxda luqadaha, qaybta kaalmada adeegyada, maday finley. So [facility] [phone].    ATENCIÓN: Si habla español, tiene a varghese disposición servicios gratuitos de asistencia lingüística. [facility] [phone].    We comply with applicable federal civil rights laws and [place] laws. We do not discriminate on the basis of race, color, national origin, age, disability, sex, sexual orientation, or gender identity.            Thank you!     Thank you for choosing [facility]  for your care. Our goal is always to provide you with excellent care. Hearing back from our patients is one way we can continue to improve our services. Please take a few minutes to complete the written survey that you may receive in the mail after your visit with us. Thank you!             Your Updated Medication List - Protect others around you: Learn how to safely use, store and throw  away your medicines at www.disposemymeds.org.          This list is accurate as of: 11/14/17  3:06 PM.  Always use your most recent med list.                   Brand Name Dispense Instructions for use Diagnosis    citalopram 20 MG tablet    celeXA    30 tablet    Take 1/2 tablet (10 mg) for 1-2 weeks, then increase to 1 tablet orally daily    Adjustment disorder with depressed mood       cyclobenzaprine 5 MG tablet    FLEXERIL    30 tablet    Take 1-2 tabs as needed at bedtime for pain interrupting sleep    Chronic bilateral low back pain with right-sided sciatica, Myalgia       levothyroxine 75 MCG tablet    SYNTHROID/LEVOTHROID    60 tablet    Take 1 tablet (75 mcg) by mouth daily    Hypothyroidism, unspecified type

## 2017-11-14 NOTE — NURSING NOTE
"Chief Complaint   Patient presents with     RECHECK       Initial There were no vitals taken for this visit. Estimated body mass index is 33.34 kg/(m^2) as calculated from the following:    Height as of 10/2/17: 5' 3.5\" (1.613 m).    Weight as of 11/2/17: 191 lb 3.2 oz (86.7 kg).  Medication Reconciliation: complete  "

## 2017-11-14 NOTE — PROGRESS NOTES
SUBJECTIVE:   Leah Cox is a 27 year old female who presents to clinic today for the following health issues:      Recheck menorrhagia, discuss pelvic u/s. Still bleeding, some clotting, cramping    The patient is seen in place and a follow-up ongoing vaginal bleeding. Her periods are typically been regular and normal. She reports onset of this current episode of persistent bleeding starting 10/29. She presented to the ED 11/1, saturating super tampons and pads every hour. Hemoglobin was stable, TSH was checked and was elevated. She followed up in clinic, was started on levothyroxine. Pelvic exam at that time was negative for any obvious abnormality, no cervical polyps, no trauma to the cervix or vaginal area. She was started on Provera 10 mg 1 tablet daily for 10 days. Her last dose was yesterday. She states that one time she thought the bleeding has stopped, there is just a small amount of brown bleeding. The next morning she woke up with her pajamas and sheets once again saturated with bright red blood. She continues to bleed, but not as heavily. Today she has saturated 2 tampons in an 8 hour period. She does pass large clots.    Problem list and histories reviewed & adjusted, as indicated.  Additional history: as documented    BP Readings from Last 3 Encounters:   11/14/17 112/80   11/02/17 110/80   11/01/17 (!) 135/100    Wt Readings from Last 3 Encounters:   11/14/17 190 lb 12.8 oz (86.5 kg)   11/02/17 191 lb 3.2 oz (86.7 kg)   10/02/17 194 lb (88 kg)                      Reviewed and updated as needed this visit by clinical staffTobacco  Allergies  Meds  Med Hx  Surg Hx  Fam Hx  Soc Hx      Reviewed and updated as needed this visit by Provider         ROS:  Constitutional, HEENT, cardiovascular, pulmonary, GI, , musculoskeletal, neuro, skin, endocrine and psych systems are negative, except as otherwise noted.      OBJECTIVE:   /80  Pulse 80  Temp 98.6  F (37  C) (Tympanic)  Wt 190  lb 12.8 oz (86.5 kg)  BMI 33.27 kg/m2  Body mass index is 33.27 kg/(m^2).  GENERAL: healthy, alert and no distress  NECK: no adenopathy, no asymmetry, masses, or scars and thyroid normal to palpation  RESP: lungs clear to auscultation - no rales, rhonchi or wheezes  CV: regular rate and rhythm, normal S1 S2, no S3 or S4, no murmur, click or rub, no peripheral edema and peripheral pulses strong  ABDOMEN: soft, nontender, no hepatosplenomegaly, no masses and bowel sounds normal  SKIN: Pink, warm, dry        ASSESSMENT/PLAN:     Hypothyroidism; recently started on levothyroxine   Plan:  Labs:  TSH in 2 months        1. Menorrhagia with regular cycle  - US Pelvic Complete w Transvaginal; Future  - OB/GYN REFERRAL    Follow-up with primary care for ongoing management of hypothyroidism  Was referred to gynecology for further management of menorrhagia not resolving with Provera therapy    NADJA Pierce CNP  [facility]

## 2017-11-15 ENCOUNTER — HOSPITAL ENCOUNTER (OUTPATIENT)
Dept: ULTRASOUND IMAGING | Facility: CLINIC | Age: 27
Discharge: HOME | End: 2017-11-15
Attending: NURSE PRACTITIONER | Admitting: NURSE PRACTITIONER
Payer: COMMERCIAL

## 2017-11-15 DIAGNOSIS — N92.0 MENORRHAGIA WITH REGULAR CYCLE: ICD-10-CM

## 2017-11-15 PROCEDURE — 76830 TRANSVAGINAL US NON-OB: CPT

## 2017-12-08 ENCOUNTER — OFFICE VISIT (OUTPATIENT)
Dept: OBGYN | Facility: CLINIC | Age: 27
End: 2017-12-08
Payer: COMMERCIAL

## 2017-12-08 VITALS
HEART RATE: 98 BPM | WEIGHT: 183 LBS | BODY MASS INDEX: 31.91 KG/M2 | SYSTOLIC BLOOD PRESSURE: 134 MMHG | DIASTOLIC BLOOD PRESSURE: 85 MMHG

## 2017-12-08 DIAGNOSIS — N84.0 ENDOMETRIAL POLYP: Primary | ICD-10-CM

## 2017-12-08 PROCEDURE — 99203 OFFICE O/P NEW LOW 30 MIN: CPT | Performed by: OBSTETRICS & GYNECOLOGY

## 2017-12-08 ASSESSMENT — PAIN SCALES - GENERAL: PAINLEVEL: NO PAIN (0)

## 2017-12-08 NOTE — NURSING NOTE
"Chief Complaint   Patient presents with     Consult     Menorrhagia with regular cycle       Initial Wt 83 kg (183 lb)  LMP 11/09/2017  BMI 31.91 kg/m2 Estimated body mass index is 31.91 kg/(m^2) as calculated from the following:    Height as of 10/2/17: 1.613 m (5' 3.5\").    Weight as of this encounter: 83 kg (183 lb).  Medication Reconciliation: complete  "

## 2017-12-08 NOTE — MR AVS SNAPSHOT
"              After Visit Summary   12/8/2017    Leah Cox    MRN: [id number]           Patient Information     Date Of Birth          1990        Visit Information        Provider Department      12/8/2017 9:00 AM Maddy Farnsworth, DO [facility]        Today's Diagnoses     Endometrial polyp    -  1      Care Instructions    Please call if you any questions.    Kim Quanah  [address]  [place]   [address]  [phone]        Maddy Farnsworth          Follow-ups after your visit        Follow-up notes from your care team     Return in about 4 weeks (around 1/5/2018).      Future tests that were ordered for you today     Open Future Orders        Priority Expected Expires Ordered    US Pelvic Complete with Transvaginal Routine  6/29/2018 12/8/2017            Who to contact     If you have questions or need follow up information about today's clinic visit or your schedule please contact [facility] directly at [phone].  Normal or non-critical lab and imaging results will be communicated to you by MyChart, letter or phone within 4 business days after the clinic has received the results. If you do not hear from us within 7 days, please contact the clinic through [employer] or phone. If you have a critical or abnormal lab result, we will notify you by phone as soon as possible.  Submit refill requests through [employer] or call your pharmacy and they will forward the refill request to us. Please allow 3 business days for your refill to be completed.          Additional Information About Your Visit        [employer] Information     [employer] lets you send messages to your doctor, view your test results, renew your prescriptions, schedule appointments and more. To sign up, go to [email or web address] . Click on \"Log in\" on the left side of the screen, which will take you to the Welcome page. Then click on \"Sign up Now\" on the right side of the page.     You will be " asked to enter the access code listed below, as well as some personal information. Please follow the directions to create your username and password.     Your access code is: [email or web address]  Expires: 2018  7:45 PM     Your access code will  in 90 days. If you need help or a new code, please call your [place] clinic or [phone].        Care EveryWhere ID     This is your Care EveryWhere ID. This could be used by other organizations to access your [place] medical records  [id number]        Your Vitals Were     Pulse Last Period BMI (Body Mass Index)             98 2017 31.91 kg/m2          Blood Pressure from Last 3 Encounters:   17 134/85   17 112/80   17 110/80    Weight from Last 3 Encounters:   17 83 kg (183 lb)   17 86.5 kg (190 lb 12.8 oz)   17 86.7 kg (191 lb 3.2 oz)               Primary Care Provider Office Phone # Fax #    Alize Sofía Ragland, APRN [facility] [phone] [address] [place] [address]        Equal Access to Services     CONCHIS SALCIDO AH: Hadii aad ku hadasho Soomaali, waaxda luqadaha, qaybta kaalmada adeegyada, maday pollockn azael finley. So [facility] [phone].    ATENCIÓN: Si habla español, tiene a varghese disposición servicios gratuitos de asistencia lingüística. Angi al [phone].    We comply with applicable federal civil rights laws and [place] laws. We do not discriminate on the basis of race, color, national origin, age, disability, sex, sexual orientation, or gender identity.            Thank you!     Thank you for choosing [facility]  for your care. Our goal is always to provide you with excellent care. Hearing back from our patients is one way we can continue to improve our services. Please take a few minutes to complete the written survey that you may receive in the mail after your visit with us. Thank you!             Your Updated Medication List - Protect others around you: Learn  how to safely use, store and throw away your medicines at www.disposemymeds.org.          This list is accurate as of: 12/8/17  9:37 AM.  Always use your most recent med list.                   Brand Name Dispense Instructions for use Diagnosis    levothyroxine 75 MCG tablet    SYNTHROID/LEVOTHROID    60 tablet    Take 1 tablet (75 mcg) by mouth daily    Hypothyroidism, unspecified type

## 2017-12-08 NOTE — PROGRESS NOTES
"Subjective  27 year old non-pregnant female presents today complaining of menometrorrhagia.  Last menstrual period was 11/9-11/29.  She went to the ED because of dizziness, fatigue, and very heavy bleeding.  Patient was on \"hormone medication\" for 10 days-Provera.  Patient has always had irregular menses.  She states her last menstrual period was the worst she has had.  She passed large clots.  She has bad dysmenorrhea as well.  Patient was on oral contractive pills in the past.  She stopped it because she was trying to get pregnant and then never restarted it.  No pregnancies.  Patient is not sexually active.  No problems urinating.  Normal bowel movements.  Patient was just diagnosed with hypothyroidism while in the ED.  Her TSH was 9.13.  She is taking Synthroid now. She is not bleeding now.  Her Hgb was 13.7 in the ED.  She is refusing a pelvic exam today.  We discussed her ultrasound in detail.  I recommended patient follow up for a pelvic exam and repeat ultrasound.  I recommended patient start medical management however patient is refusing this today as well.  She will follow up when she is ready.         ROS: 10 point ROS neg other than the symptoms noted above in the HPI.  No past medical history on file.  Past Surgical History:   Procedure Laterality Date     LEEP TX, CERVICAL  2009     RELEASE CARPAL TUNNEL Right 10/5/2016    Procedure: RELEASE CARPAL TUNNEL;  Surgeon: Christian Sebastian MD;  Location: [facility] OR     Family History   Problem Relation Age of Onset     Hypertension Father      Social History   Substance Use Topics     Smoking status: Current Every Day Smoker     Packs/day: 0.25     Smokeless tobacco: Never Used     Alcohol use 0.0 oz/week     0 Standard drinks or equivalent per week      Comment: occ.         Objective  Vitals: /85  Pulse 98  Wt 83 kg (183 lb)  LMP 11/09/2017  BMI 31.91 kg/m2  BMI= Body mass index is 31.91 kg/(m^2).    General appearance=no deformities " noted  Psych=mood is stable      Pelvic ultrasound=11/15/17:  FINDINGS: The uterus measures 6.7 x 3.1 x 4.1 cm. Endometrial stripe  measures up to 0.5 cm. Hypoechoic structure (measuring 0.4 x 0.3 x 0.5  cm) with some vascularity is seen in the anterior endometrial stripe  of the uterine body slightly to the left. This could represent a small  polyp. Endometrium is otherwise normal in appearance.     The ovaries measure 3.1 x 2.1 x 1.9 cm on the right and 4.0 x 1.7 x  3.5 cm on the left. Left ovary is only seen transabdominally due to  its positioning. Follicles are seen in the right ovary.     No abnormal free fluid collections are identified.         IMPRESSION:  1. Small hypoechoic structure with some vascularity in the anterior  left endometrial lining of the upper uterine body could represent a  small polyp in the appropriate setting.  2. Otherwise negative pelvic ultrasound.       Assessment  1.)  Menometrorrhagia  2.)  Endometrial polyp  3.)  Hypothyroidism      Plan  1.)  Follow up when ready to start oral contractive pills  2.)  Follow up with PCP for hypothyroidism  3.)  Repeat ultrasound in 3-6months      30 minutes was spent face to face with the patient today discussing her history, diagnosis, and follow-up plan as noted above.  Over 50% of the visit was spent in counseling and coordination of care.        Nursing notes read and reviewed    Maddy Farnsworth

## 2017-12-08 NOTE — PATIENT INSTRUCTIONS
Please call if you any questions.    Chivo Ball  [address]  [place]   [address]  [phone]        Maddy Farnsworth

## 2017-12-18 ENCOUNTER — TELEPHONE (OUTPATIENT)
Dept: FAMILY MEDICINE | Facility: CLINIC | Age: 27
End: 2017-12-18

## 2017-12-18 NOTE — TELEPHONE ENCOUNTER
Patient called back, message below was relayed to patient, and patient states she will wait to hear from Alize on Tuesday.  Thank you,  Carole Matamoros  Patient Representative

## 2017-12-18 NOTE — TELEPHONE ENCOUNTER
Reason for Call:  Other call back about prescription levothyroxine (SYNTHROID/LEVOTHROID) 75 MCG tablet    Detailed comments: Patient called and states that she was told when she started this medication that she wouldn't have heavy periods any more. She said that she started her period on the 12/13 and it is really heavy and she is clotting again. She is wondering if this is normal or maybe the dosage isn't correct. She is aware that Alize Ragland is not in clinic today and is wondering if someone would be able to give her a call today to discuss. Please advise.     Phone Number Patient can be reached at: Home number on file [phone] (home)    Best Time: any     Can we leave a detailed message on this number? YES    Call taken on 12/18/2017 at 9:11 AM by Kei Pham

## 2017-12-18 NOTE — TELEPHONE ENCOUNTER
Per Dr. Ni: keep this for tomorrow for Alize. This is a long term problem and OK to wait until tomorrow.

## 2017-12-19 NOTE — TELEPHONE ENCOUNTER
Message was left on her identified voicemail recommending she start the oral contraceptive.  Explained that while thyroid abnormality may affect some irregularity with her cycle, taking the levothyroxine is not going to totally address the problem of heavy periods.  She needs to start the oral contraceptive and follow-up with gynecology for pelvic exam.  Also informed there has been an order placed for repeat pelvic ultrasound in about 2-3 months.

## 2017-12-27 DIAGNOSIS — N92.0 EXCESSIVE OR FREQUENT MENSTRUATION: ICD-10-CM

## 2017-12-27 DIAGNOSIS — E03.9 HYPOTHYROIDISM, UNSPECIFIED TYPE: ICD-10-CM

## 2017-12-27 LAB — TSH SERPL DL<=0.005 MIU/L-ACNC: 2.26 MU/L (ref 0.4–4)

## 2017-12-27 PROCEDURE — 84443 ASSAY THYROID STIM HORMONE: CPT | Performed by: NURSE PRACTITIONER

## 2017-12-27 PROCEDURE — 36415 COLL VENOUS BLD VENIPUNCTURE: CPT | Performed by: NURSE PRACTITIONER

## 2017-12-27 RX ORDER — LEVOTHYROXINE SODIUM 75 UG/1
75 TABLET ORAL DAILY
Qty: 90 TABLET | Refills: 3 | Status: SHIPPED | OUTPATIENT
Start: 2017-12-27 | End: 2018-08-02 | Stop reason: DRUGHIGH

## 2018-01-23 ENCOUNTER — TELEPHONE (OUTPATIENT)
Dept: FAMILY MEDICINE | Facility: CLINIC | Age: 28
End: 2018-01-23

## 2018-01-23 NOTE — TELEPHONE ENCOUNTER
EUGENIA on id vm. Appointment made for 230 on Friday 1/26/18. Patient is to call back and confirm. ACase/MA

## 2018-01-23 NOTE — TELEPHONE ENCOUNTER
Patient called back and confirmed.  Thank you,  Mary Sue   for [facility]

## 2018-01-23 NOTE — TELEPHONE ENCOUNTER
Reason for Call:  Same Day Appointment, Requested Provider:  Alize Ragland NP    PCP: Alize Ragland    Reason for visit: back and hip pain    Duration of symptoms: ongoing    Have you been treated for this in the past? Yes    Additional comments: Pt is looking for appt Friday in the afternoon. Pain on her left side, but hurts on the right buttocks. Possible sciatic nerve or pinched nerve? It burns, joints and bones hurt she states. She can't sleep. Can you work her in on Friday? Please call and advise.     Can we leave a detailed message on this number? YES    Phone number patient can be reached at: Home number on file [phone] (home)    Best Time: anytime    Call taken on 1/23/2018 at 4:38 PM by Lisa Bhakta

## 2018-01-26 ENCOUNTER — OFFICE VISIT (OUTPATIENT)
Dept: FAMILY MEDICINE | Facility: CLINIC | Age: 28
End: 2018-01-26
Payer: COMMERCIAL

## 2018-01-26 VITALS
OXYGEN SATURATION: 99 % | SYSTOLIC BLOOD PRESSURE: 126 MMHG | TEMPERATURE: 97 F | DIASTOLIC BLOOD PRESSURE: 70 MMHG | HEART RATE: 100 BPM | HEIGHT: 64 IN | BODY MASS INDEX: 31.58 KG/M2 | WEIGHT: 185 LBS

## 2018-01-26 DIAGNOSIS — F43.21 ADJUSTMENT DISORDER WITH DEPRESSED MOOD: ICD-10-CM

## 2018-01-26 DIAGNOSIS — M53.3 SACROILIAC JOINT PAIN: Primary | ICD-10-CM

## 2018-01-26 PROCEDURE — 99214 OFFICE O/P EST MOD 30 MIN: CPT | Performed by: NURSE PRACTITIONER

## 2018-01-26 RX ORDER — PREDNISONE 20 MG/1
40 TABLET ORAL DAILY
Qty: 10 TABLET | Refills: 0 | Status: SHIPPED | OUTPATIENT
Start: 2018-01-26 | End: 2018-01-31

## 2018-01-26 NOTE — PROGRESS NOTES
"  SUBJECTIVE:   Leah Cox is a 27 year old female who presents to clinic today for the following health issues:      Rechecking back, hip pains. Seen by Dr Jaimes 10/17 for same issue. Was better with some physical Therapy, unable to continue with this. Has been trying to continue with exercises at home. Having pain in left buttocks, sometimes right thigh getting numb feeling    She would also like to have something prescribed for depression.  She was started on Celexa last March, took it for a month, then discontinued it did not, feeling that it made it difficult for her to sleep.  She would like to restart something different.    She continues to have pain of the lower back radiating across the right sacral area towards the right hip.  She has been continuing with her physical therapy exercises.  Pain is sharp, at times radiates through the buttock into the thigh.  It does not radiate to the knee        Problem list and histories reviewed & adjusted, as indicated.  Additional history: as documented    BP Readings from Last 3 Encounters:   01/26/18 126/70   12/08/17 134/85   11/14/17 112/80    Wt Readings from Last 3 Encounters:   01/26/18 185 lb (83.9 kg)   12/08/17 183 lb (83 kg)   11/14/17 190 lb 12.8 oz (86.5 kg)                    Reviewed and updated as needed this visit by clinical staff       Reviewed and updated as needed this visit by Provider         ROS:  Constitutional, HEENT, cardiovascular, pulmonary, gi and gu systems are negative, except as otherwise noted.    OBJECTIVE:     /70  Pulse 100  Temp 97  F (36.1  C) (Temporal)  Ht 5' 3.5\" (1.613 m)  Wt 185 lb (83.9 kg)  [facility] 12/13/2017  SpO2 99%  BMI 32.26 kg/m2  Body mass index is 32.26 kg/(m^2).   GENERAL: healthy, alert and no distress  Comprehensive back pain exam: She ambulates with a normal gait.  There is no vertebral tenderness to percussion palpation.  She is tender in the right SI joint, also has some tenderness into the " right mid buttock area.  Normal range of motion at the waist including flexion, extension, and side bending.  Patellar and Achilles reflexes are brisk and symmetrical  Strong resisted dorsiflexion of the great toes bilaterally, strong resisted inversion and eversion at the ankle  Straight leg raise is negative bilaterally        ASSESSMENT/PLAN:     Problem List Items Addressed This Visit        Medium    Adjustment disorder with depressed mood    Relevant Medications    sertraline (ZOLOFT) 50 MG tablet      Other Visit Diagnoses     Sacroiliac joint pain    -  Primary    Relevant Medications    predniSONE (DELTASONE) 20 MG tablet    Other Relevant Orders    FAREED PT, HAND, AND CHIROPRACTIC REFERRAL           Start Zoloft 25 mg daily for 1 week, then increase to full tablet 50 mg daily.  The action medication and potential side effects were reviewed  Referred to Danica Hu, for chiropractic therapy  Prednisone burst 40 mg daily for 5 days to help reduce inflammation  Continue physical therapy exercise  Follow-up in clinic in 3-4 weeks    NADJA Pierce [facility]

## 2018-01-26 NOTE — MR AVS SNAPSHOT
After Visit Summary   1/26/2018    Leah Cox    MRN: [id number]           Patient Information     Date Of Birth          1990        Visit Information        Provider Department      1/26/2018 2:30 PM Alize Ragland APRN CNP [facility]        Today's Diagnoses     Sacroiliac joint pain    -  1    Adjustment disorder with depressed mood           Follow-ups after your visit        Additional Services     FAREED PT, HAND, AND CHIROPRACTIC REFERRAL       **This order will print in the [facility] Scheduling Office**    Physical Therapy, Hand Therapy and Chiropractic Care are available through:    *[facility]  *[facility]  *[facility]    Call one number to schedule at any of the above locations: [phone].    Your provider has referred you to: Chiropractic at [facility] or [facility]    Indication/Reason for Referral: Sacroiliac joint pain  Onset of Illness:   Therapy Orders: Evaluate and Treat  Special Programs:   Special Request:     Hi Do      Additional Comments for the Therapist or Chiropractor:     Please be aware that coverage of these services is subject to the terms and limitations of your health insurance plan.  Call member services at your health plan with any benefit or coverage questions.      Please bring the following to your appointment:    *Your personal calendar for scheduling future appointments  *Comfortable clothing                  Your next 10 appointments already scheduled     Jan 31, 2018  3:00 PM CST   (Arrive by 2:45 PM)   [facility] Chiropractor with Dania Magallanes DC   [facility]    [address] [place] [id number] [phone]              Who to contact     If you have questions or need follow up information about today's clinic visit or your schedule please contact [facility] directly at [phone].  Normal or non-critical  "lab and imaging results will be communicated to you by MyChart, letter or phone within 4 business days after the clinic has received the results. If you do not hear from us within 7 days, please contact the clinic through [employer] or phone. If you have a critical or abnormal lab result, we will notify you by phone as soon as possible.  Submit refill requests through [employer] or call your pharmacy and they will forward the refill request to us. Please allow 3 business days for your refill to be completed.          Additional Information About Your Visit        [employer] Information     [employer] lets you send messages to your doctor, view your test results, renew your prescriptions, schedule appointments and more. To sign up, go to [email or web address] . Click on \"Log in\" on the left side of the screen, which will take you to the Welcome page. Then click on \"Sign up Now\" on the right side of the page.     You will be asked to enter the access code listed below, as well as some personal information. Please follow the directions to create your username and password.     Your access code is: [email or web address]  Expires: 2018  7:45 PM     Your access code will  in 90 days. If you need help or a new code, please call your [place] clinic or [phone].        Care EveryWhere ID     This is your Care EveryWhere ID. This could be used by other organizations to access your [place] medical records  [id number]        Your Vitals Were     Pulse Temperature Height Last Period Pulse Oximetry BMI (Body Mass Index)    100 97  F (36.1  C) (Temporal) 5' 3.5\" (1.613 m) 2017 99% 32.26 kg/m2       Blood Pressure from Last 3 Encounters:   18 126/70   17 134/85   17 112/80    Weight from Last 3 Encounters:   18 185 lb (83.9 kg)   17 183 lb (83 kg)   17 190 lb 12.8 oz (86.5 kg)              We Performed the Following     FAREED PT, HAND, AND CHIROPRACTIC REFERRAL          Today's Medication " Changes          These changes are accurate as of 1/26/18 11:59 PM.  If you have any questions, ask your nurse or doctor.               Start taking these medicines.        Dose/Directions    predniSONE 20 MG tablet   Commonly known as:  DELTASONE   Used for:  Sacroiliac joint pain   Started by:  Alize Ragland APRN CNP        Dose:  40 mg   Take 2 tablets (40 mg) by mouth daily for 5 days   Quantity:  10 tablet   Refills:  0       sertraline 50 MG tablet   Commonly known as:  ZOLOFT   Used for:  Adjustment disorder with depressed mood   Started by:  Alize Ragland APRN CNP        Take 1/2 tablet (25 mg) for 1-2 weeks, then increase to 1 tablet orally daily   Quantity:  30 tablet   Refills:  0            Where to get your medicines      [facility] [address] Pierce [facility] [address]     Phone:  [phone]     predniSONE 20 MG tablet    sertraline 50 MG tablet                Primary Care Provider Office Phone # Fax #    NADJA Pierce [phone]       [address]   [facility] [address]        Equal Access to Services     [facility]: Hadii ketan ku hadasho Soomaali, waaxda luqadaha, qaybta kaalmada adeegyada, maday good . So [facility] [phone].    ATENCIÓN: Si habla español, tiene a varghese disposición servicios gratuitos de asistencia lingüística. [facility] [phone].    We comply with applicable federal civil rights laws and [place] laws. We do not discriminate on the basis of race, color, national origin, age, disability, sex, sexual orientation, or gender identity.            Thank you!     Thank you for choosing [facility]  for your care. Our goal is always to provide you with excellent care. Hearing back from our patients is one way we can continue to improve our services. Please take a few minutes to complete the written survey that you may  receive in the mail after your visit with us. Thank you!             Your Updated Medication List - Protect others around you: Learn how to safely use, store and throw away your medicines at www.disposemymeds.org.          This list is accurate as of 1/26/18 11:59 PM.  Always use your most recent med list.                   Brand Name Dispense Instructions for use Diagnosis    levothyroxine 75 MCG tablet    SYNTHROID/LEVOTHROID    90 tablet    Take 1 tablet (75 mcg) by mouth daily    Hypothyroidism, unspecified type       predniSONE 20 MG tablet    DELTASONE    10 tablet    Take 2 tablets (40 mg) by mouth daily for 5 days    Sacroiliac joint pain       sertraline 50 MG tablet    ZOLOFT    30 tablet    Take 1/2 tablet (25 mg) for 1-2 weeks, then increase to 1 tablet orally daily    Adjustment disorder with depressed mood

## 2018-01-31 ENCOUNTER — THERAPY VISIT (OUTPATIENT)
Dept: CHIROPRACTIC MEDICINE | Facility: CLINIC | Age: 28
End: 2018-01-31
Payer: COMMERCIAL

## 2018-01-31 DIAGNOSIS — M54.41 BILATERAL LOW BACK PAIN WITH RIGHT-SIDED SCIATICA: ICD-10-CM

## 2018-01-31 DIAGNOSIS — M99.02 SEGMENTAL DYSFUNCTION OF THORACIC REGION: ICD-10-CM

## 2018-01-31 DIAGNOSIS — M99.03 SEGMENTAL DYSFUNCTION OF LUMBAR REGION: ICD-10-CM

## 2018-01-31 DIAGNOSIS — M99.04 SEGMENTAL DYSFUNCTION OF SACRAL REGION: Primary | ICD-10-CM

## 2018-01-31 PROCEDURE — 98941 CHIROPRACT MANJ 3-4 REGIONS: CPT | Mod: AT | Performed by: CHIROPRACTOR

## 2018-01-31 PROCEDURE — 97035 APP MDLTY 1+ULTRASOUND EA 15: CPT | Performed by: CHIROPRACTOR

## 2018-01-31 PROCEDURE — 99203 OFFICE O/P NEW LOW 30 MIN: CPT | Mod: 25 | Performed by: CHIROPRACTOR
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
## 2018-01-31 NOTE — MR AVS SNAPSHOT
"              After Visit Summary   1/31/2018    Leah Cox    MRN: [id number]           Patient Information     Date Of Birth          1990        Visit Information        Provider Department      1/31/2018 3:00 PM Dania Magallanes DC [facility]        Today's Diagnoses     Segmental dysfunction of sacral region    -  1    Bilateral low back pain with right-sided sciatica        Segmental dysfunction of lumbar region        Segmental dysfunction of thoracic region           Follow-ups after your visit        Your next 10 appointments already scheduled     Feb 07, 2018  3:15 PM CST   [facility]ARE[facility] Chiropractor with Dania Magallanes DC   [facility]    [address] [address] [phone]              Who to contact     If you have questions or need follow up information about today's clinic visit or your schedule please contact [facility] directly at [phone].  Normal or non-critical lab and imaging results will be communicated to you by M2TECHhart, letter or phone within 4 business days after the clinic has received the results. If you do not hear from us within 7 days, please contact the clinic through M2TECHhart or phone. If you have a critical or abnormal lab result, we will notify you by phone as soon as possible.  Submit refill requests through Drik or call your pharmacy and they will forward the refill request to us. Please allow 3 business days for your refill to be completed.          Additional Information About Your Visit        M[employer]TECH[employer] Information     Drik lets you send messages to your doctor, view your test results, renew your prescriptions, schedule appointments and more. To sign up, go to [email or web address] . Click on \"Log in\" on the left side of the screen, which will take you to the Welcome page. Then click on \"Sign up Now\" on the right side of the page.     You will " be asked to enter the access code listed below, as well as some personal information. Please follow the directions to create your username and password.     Your access code is: [email or web address]  Expires: 2018  3:31 PM     Your access code will  in 90 days. If you need help or a new code, please call your [facility] clinic or [phone].        Care EveryWhere ID     This is your Care EveryWhere ID. This could be used by other organizations to access your [facility] medical records  [id number]         Blood Pressure from Last 3 Encounters:   18 126/70   17 134/85   17 112/80    Weight from Last 3 Encounters:   18 83.9 kg (185 lb)   17 83 kg (183 lb)   17 86.5 kg (190 lb 12.8 oz)              We Performed the Following     CHIROPRAC MANIP,SPINAL,3-4 REGIONS     OFFICE/OUTPT VISIT,NEW,LEVFÁTIMA III     ULTRASOUND THERAPY        Primary Care Provider Office Phone # Fax #    Alize Sofía Ragland, [facility] [phone] [address] [place] [address]        Equal Access to Services     CONCHIS SALCIDO AH: Hadii ketan deeo Sotcali, waaxda luqadaha, qaybta kaalmada adeegyada, maday finley. So [facility] [phone].    ATENCIÓN: Si habla español, tiene a varghese disposición servicios gratuitos de asistencia lingüística. Angi al [phone].    We comply with applicable federal civil rights laws and [place] laws. We do not discriminate on the basis of race, color, national origin, age, disability, sex, sexual orientation, or gender identity.            Thank you!     Thank you for choosing [facility]  for your care. Our goal is always to provide you with excellent care. Hearing back from our patients is one way we can continue to improve our services. Please take a few minutes to complete the written survey that you may receive in the mail after your visit with us. Thank you!             Your Updated Medication List -  Protect others around you: Learn how to safely use, store and throw away your medicines at www.disposemymeds.org.          This list is accurate as of 1/31/18  3:31 PM.  Always use your most recent med list.                   Brand Name Dispense Instructions for use Diagnosis    levothyroxine 75 MCG tablet    SYNTHROID/LEVOTHROID    90 tablet    Take 1 tablet (75 mcg) by mouth daily    Hypothyroidism, unspecified type       predniSONE 20 MG tablet    DELTASONE    10 tablet    Take 2 tablets (40 mg) by mouth daily for 5 days    Sacroiliac joint pain       sertraline 50 MG tablet    ZOLOFT    30 tablet    Take 1/2 tablet (25 mg) for 1-2 weeks, then increase to 1 tablet orally daily    Adjustment disorder with depressed mood

## 2018-02-05 ENCOUNTER — RESULT FOLLOW UP (OUTPATIENT)
Dept: FAMILY MEDICINE | Facility: CLINIC | Age: 28
End: 2018-02-05

## 2018-02-05 DIAGNOSIS — Z98.890 S/P LEEP OF CERVIX: ICD-10-CM

## 2018-02-05 NOTE — PROGRESS NOTES
5/15/09 NIL pap  6/30/11 ASCUS pap, + HR HPV.   8/30/11 [facility] bx: SUZAN 2, ECC SUZAN 1  3/27/12 [facility] bx: SUZAN 1, ECC: neg.  Pap: NIL, + HR HPV  9/26/12 ASCUS pap, + HR HPV  11/7/12 [facility] bx: SUZAN 2, ECC: SUZAN 1.   1/23/13 LEEP: SUZAN 2 with free margins.   6/25/13 NIL pap      ** above information is from Care Everywhere  **  12/8/15 NIL pap, neg HPV. Plan: cotest in 1 yr, per Staff message from Dr. Farnsworth dated 2/5/18.  2/21/18 Cotest reminder letter sent (rl)  4/13/18 NIL pap, neg HR HPV.Plan: cotest in 3 years.

## 2018-02-05 NOTE — LETTER
February 21, 2018      Leah Cox  [address] [place] [id number]    Dear ,      This letter is to remind you that you are due for your follow up PAP smear and HPV test on or about 3/3/18.    Please call [phone] to schedule your appointment at your earliest convenience.     If you have completed the tests outside of [facility], please have the results forwarded to our office. We will update the chart for your primary Physician to review before your next annual physical.     Sincerely,      NADJA Pierce CNP/rlm

## 2018-02-07 ENCOUNTER — THERAPY VISIT (OUTPATIENT)
Dept: CHIROPRACTIC MEDICINE | Facility: CLINIC | Age: 28
End: 2018-02-07
Payer: COMMERCIAL

## 2018-02-07 DIAGNOSIS — M99.03 SEGMENTAL DYSFUNCTION OF LUMBAR REGION: ICD-10-CM

## 2018-02-07 DIAGNOSIS — M54.41 BILATERAL LOW BACK PAIN WITH RIGHT-SIDED SCIATICA: ICD-10-CM

## 2018-02-07 DIAGNOSIS — M99.04 SEGMENTAL DYSFUNCTION OF SACRAL REGION: ICD-10-CM

## 2018-02-07 DIAGNOSIS — M99.02 SEGMENTAL DYSFUNCTION OF THORACIC REGION: ICD-10-CM

## 2018-02-07 PROCEDURE — 98941 CHIROPRACT MANJ 3-4 REGIONS: CPT | Mod: AT | Performed by: CHIROPRACTOR

## 2018-02-07 PROCEDURE — 97035 APP MDLTY 1+ULTRASOUND EA 15: CPT | Performed by: CHIROPRACTOR
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
## 2018-02-07 NOTE — MR AVS SNAPSHOT
"              After Visit Summary   2/7/2018    Leah Cox    MRN: [id number]           Patient Information     Date Of Birth          1990        Visit Information        Provider Department      2/7/2018 3:15 PM Dania Magallanes DC [facility]        Today's Diagnoses     Bilateral low back pain with right-sided sciatica        Segmental dysfunction of lumbar region        Segmental dysfunction of thoracic region        Segmental dysfunction of sacral region           Follow-ups after your visit        Your next 10 appointments already scheduled     Feb 14, 2018  3:30 PM CST   FAREED Chiropractor with Dania Magallanes DC   [facility] ([facility])    [address] [place] [address] [id number] [phone]              Who to contact     If you have questions or need follow up information about today's clinic visit or your schedule please contact [facility] directly at [phone].  Normal or non-critical lab and imaging results will be communicated to you by MyChart, letter or phone within 4 business days after the clinic has received the results. If you do not hear from us within 7 days, please contact the clinic through [employer] or phone. If you have a critical or abnormal lab result, we will notify you by phone as soon as possible.  Submit refill requests through [employer] or call your pharmacy and they will forward the refill request to us. Please allow 3 business days for your refill to be completed.          Additional Information About Your Visit        Guidance SoftwareharCyphoma Information     Whiphand lets you send messages to your doctor, view your test results, renew your prescriptions, schedule appointments and more. To sign up, go to [email or web address] . Click on \"Log in\" on the left side of the screen, which will take you to the Welcome page. Then click on \"Sign up Now\" on the right side of the page.     You will be " asked to enter the access code listed below, as well as some personal information. Please follow the directions to create your username and password.     Your access code is: [email or web address]  Expires: 2018  3:31 PM     Your access code will  in 90 days. If you need help or a new code, please call your [facility] clinic or [phone].        Care EveryWhere ID     This is your Care EveryWhere ID. This could be used by other organizations to access your [facility] medical records  [id number]         Blood Pressure from Last 3 Encounters:   18 126/70   17 134/85   17 112/80    Weight from Last 3 Encounters:   18 83.9 kg (185 lb)   17 83 kg (183 lb)   17 86.5 kg (190 lb 12.8 oz)              We Performed the Following     CHIROPRAC MANIP,SPINAL,3-4 REGIONS     ULTRASOUND THERAPY        Primary Care Provider Office Phone # Fax #    NADJA Pierce [facility] [phone] [address] [place] [address]        Equal Access to Services     [facility]: Hadii aad ku hadasho Sotcali, waaxda luqadaha, qaybta kaalmada adeegyada, maday good . So [facility] [phone].    ATENCIÓN: Si habla español, tiene a varghese disposición servicios gratuitos de asistencia lingüística. Llame al [phone].    We comply with applicable federal civil rights laws and [place] laws. We do not discriminate on the basis of race, color, national origin, age, disability, sex, sexual orientation, or gender identity.            Thank you!     Thank you for choosing [facility]  for your care. Our goal is always to provide you with excellent care. Hearing back from our patients is one way we can continue to improve our services. Please take a few minutes to complete the written survey that you may receive in the mail after your visit with us. Thank you!             Your Updated Medication List - Protect others around you: Learn how to  safely use, store and throw away your medicines at www.disposemymeds.org.          This list is accurate as of 2/7/18  3:31 PM.  Always use your most recent med list.                   Brand Name Dispense Instructions for use Diagnosis    levothyroxine 75 MCG tablet    SYNTHROID/LEVOTHROID    90 tablet    Take 1 tablet (75 mcg) by mouth daily    Hypothyroidism, unspecified type       sertraline 50 MG tablet    ZOLOFT    30 tablet    Take 1/2 tablet (25 mg) for 1-2 weeks, then increase to 1 tablet orally daily    Adjustment disorder with depressed mood

## 2018-02-07 NOTE — PROGRESS NOTES
"Visit #:  2 of 8 based on treatment plan 1/31/2018    Subjective:  Leah Cox is a 27 year old female who is seen in f/u up for:        Bilateral low back pain with right-sided sciatica  Segmental dysfunction of lumbar region  Segmental dysfunction of thoracic region  Segmental dysfunction of sacral region.     Since last visit on 1/31/2018,  Leah Cox reports the following changes: Patient presents and states that she is doing better today. She was not sore after her adjustment, and got some relief, and then the pain has slowly come back, mostly at night when she is trying to sleep. She had some pain at lunch time, rated \"12/10\" and then when she stretched it improved. Heat helps.          Objective:  The following was observed:    P: pain elicited on palpation, right \"hip\"    A: static palpation demonstrates intersegmental asymmetry     R: motion palpation notes restricted motion    T: localized muscle spasm at: Gluteal, Lumbar erector spine and Piriformis R>>L      Assessment:    Segmental spinal dysfunction/restrictions found at:  T9  L4  PSIS Right    Diagnoses:      1. Bilateral low back pain with right-sided sciatica    2. Segmental dysfunction of lumbar region    3. Segmental dysfunction of thoracic region    4. Segmental dysfunction of sacral region        Patient's condition:  Patient had restrictions pre-manipulation and Patient had decreased motion prior to manipulation    Treatment effectiveness:  Post manipulation there is better intersegmental movement and Patient claims to feel looser post manipulation      Procedures:  CMT:  03515 Chiropractic manipulative treatment 3-4 regions performed   Thoracic: Diversified, T9, Prone  Lumbar: Diversified, L4, Side posture  Pelvis: Diversified, PSIS Right , Side posture    Modalities:  31623: US:  1.0 Diamond/cm squared for 8 minutes at 1.2mhz  Continuous  pulsed, Location: right piriformis    Therapeutic procedures:  Gave patient Ice instructions " post adjustment, and instructions for acute care      Prognosis: Good    Progress towards Goals:   Decrease pain from 6/10 to 3/10 in 4 treatments.   Be able to work and sleep without pain.     Response to Treatment:   Reduction of symptoms overall with care      Recommendations:    Instructions:ice 20 minutes every other hour as needed    Follow-up:  Return to care in 1 week.

## 2018-02-11 ENCOUNTER — HEALTH MAINTENANCE LETTER (OUTPATIENT)
Age: 28
End: 2018-02-11

## 2018-02-23 ENCOUNTER — THERAPY VISIT (OUTPATIENT)
Dept: CHIROPRACTIC MEDICINE | Facility: CLINIC | Age: 28
End: 2018-02-23
Payer: COMMERCIAL

## 2018-02-23 DIAGNOSIS — M99.02 SEGMENTAL DYSFUNCTION OF THORACIC REGION: ICD-10-CM

## 2018-02-23 DIAGNOSIS — M99.03 SEGMENTAL DYSFUNCTION OF LUMBAR REGION: ICD-10-CM

## 2018-02-23 DIAGNOSIS — M99.04 SEGMENTAL DYSFUNCTION OF SACRAL REGION: ICD-10-CM

## 2018-02-23 DIAGNOSIS — M54.41 BILATERAL LOW BACK PAIN WITH RIGHT-SIDED SCIATICA: ICD-10-CM

## 2018-02-23 PROCEDURE — 98941 CHIROPRACT MANJ 3-4 REGIONS: CPT | Mod: AT | Performed by: CHIROPRACTOR

## 2018-02-23 NOTE — PROGRESS NOTES
Visit #:  2 of 8 based on treatment plan 1/31/2018    Subjective:  Leah Cox is a 27 year old female who is seen in f/u up for:        Bilateral low back pain with right-sided sciatica  Segmental dysfunction of lumbar region  Segmental dysfunction of thoracic region  Segmental dysfunction of sacral region.     Since last visit on 2/7/2018,  Leah Cox reports the following changes: Patient presents and states that her back is much better. She just got off work and has no pain today. She has some occasional pressure pain on her hips before bed.           Objective:  The following was observed:    P: pain elicited on palpation, right piriformis    A: static palpation demonstrates intersegmental asymmetry     R: motion palpation notes restricted motion    T: localized muscle spasm at: Gluteal, Lumbar erector spine and Piriformis R>>L      Assessment:    Segmental spinal dysfunction/restrictions found at:  T7  T10  L4  PSIS Right    Diagnoses:      1. Bilateral low back pain with right-sided sciatica    2. Segmental dysfunction of lumbar region    3. Segmental dysfunction of thoracic region    4. Segmental dysfunction of sacral region        Patient's condition:  Patient had restrictions pre-manipulation    Treatment effectiveness:  Post manipulation there is better intersegmental movement and Patient claims to feel looser post manipulation      Procedures:  CMT:  02329 Chiropractic manipulative treatment 3-4 regions performed   Thoracic: Diversified, T7, T10, Prone  Lumbar: Drop Table, L4, Prone  Pelvis: Drop Table, PSIS Right , Prone    Modalities:  69422: MSTM:  To Gluteal, Lumbar erector spine and Piriformis  for 5 min    Therapeutic procedures:  Gave patient Ice instructions post adjustment, and instructions for acute care      Prognosis: Good    Progress towards Goals:   Decrease pain from 6/10 to 3/10 in 4 treatments.   Be able to work and sleep without pain.       Response to Treatment:    Reduction of symptoms overall with care, patient has no pain today      Recommendations:    Instructions:ice 20 minutes every other hour as needed and stretch as instructed at visit    Follow-up:  Continue treatment PRN.

## 2018-02-23 NOTE — MR AVS SNAPSHOT
"              After Visit Summary   2018    Leah Cox    MRN: [id number]           Patient Information     Date Of Birth          1990        Visit Information        Provider Department      2018 2:00 PM Dania Magallanes DC [facility]        Today's Diagnoses     Bilateral low back pain with right-sided sciatica        Segmental dysfunction of lumbar region        Segmental dysfunction of thoracic region        Segmental dysfunction of sacral region           Follow-ups after your visit        Who to contact     If you have questions or need follow up information about today's clinic visit or your schedule please contact [facility] directly at [phone].  Normal or non-critical lab and imaging results will be communicated to you by Quickfilter Technologieshart, letter or phone within 4 business days after the clinic has received the results. If you do not hear from us within 7 days, please contact the clinic through [employer] or phone. If you have a critical or abnormal lab result, we will notify you by phone as soon as possible.  Submit refill requests through Solution[employer] or call your pharmacy and they will forward the refill request to us. Please allow 3 business days for your refill to be completed.          Additional Information About Your Visit        MyChart Information     Solutionreach lets you send messages to your doctor, view your test results, renew your prescriptions, schedule appointments and more. To sign up, go to [email or web address] . Click on \"Log in\" on the left side of the screen, which will take you to the Welcome page. Then click on \"Sign up Now\" on the right side of the page.     You will be asked to enter the access code listed below, as well as some personal information. Please follow the directions to create your username and password.     Your access code is: [email or web address]  Expires: 2018  3:31 PM     Your access code will  in 90 " days. If you need help or a new code, please call your [facility] clinic or [phone].        Care EveryWhere ID     This is your Care EveryWhere ID. This could be used by other organizations to access your [facility] medical records  [id number]         Blood Pressure from Last 3 Encounters:   01/26/18 126/70   12/08/17 134/85   11/14/17 112/80    Weight from Last 3 Encounters:   01/26/18 83.9 kg (185 lb)   12/08/17 83 kg (183 lb)   11/14/17 86.5 kg (190 lb 12.8 oz)              We Performed the Following     CHIROPRAC MANIP,SPINAL,3-4 REGIONS        Primary Care Provider Office Phone # Fax #    NADJA Pierce [facility] [phone] [address] [place] [address]        Equal Access to Services     [facility]: Hadii ketan arora hadasho Sosreekanth, waaxda luqadaha, qaybta kaalmada adeegyada, maday tejada haytanvi good . So [facility] [phone].    ATENCIÓN: Si habla español, tiene a varghese disposición servicios gratuitos de asistencia lingüística. Angi al [phone].    We comply with applicable federal civil rights laws and [place] laws. We do not discriminate on the basis of race, color, national origin, age, disability, sex, sexual orientation, or gender identity.            Thank you!     Thank you for choosing [facility]  for your care. Our goal is always to provide you with excellent care. Hearing back from our patients is one way we can continue to improve our services. Please take a few minutes to complete the written survey that you may receive in the mail after your visit with us. Thank you!             Your Updated Medication List - Protect others around you: Learn how to safely use, store and throw away your medicines at www.disposemymeds.org.          This list is accurate as of 2/23/18  2:04 PM.  Always use your most recent med list.                   Brand Name Dispense Instructions for use Diagnosis    levothyroxine 75 MCG tablet     SYNTHROID/LEVOTHROID    90 tablet    Take 1 tablet (75 mcg) by mouth daily    Hypothyroidism, unspecified type       sertraline 50 MG tablet    ZOLOFT    30 tablet    Take 1/2 tablet (25 mg) for 1-2 weeks, then increase to 1 tablet orally daily    Adjustment disorder with depressed mood

## 2018-03-08 ENCOUNTER — TELEPHONE (OUTPATIENT)
Dept: FAMILY MEDICINE | Facility: CLINIC | Age: 28
End: 2018-03-08

## 2018-03-08 NOTE — TELEPHONE ENCOUNTER
Reason for call:  Patient reporting a symptom    Symptom or request: Patient states since starting the Thyroid medication in January patient has not been having menstrual cycles (in Jan there was a little spotting for one day), please advise    Duration (how long have symptoms been present):     Have you been treated for this before? No    Additional comments:     Phone Number patient can be reached at:  Home number on file [phone] (home)    Best Time:      Can we leave a detailed message on this number:  YES    Call taken on 3/8/2018 at 3:24 PM by Carole Matamoros

## 2018-03-08 NOTE — TELEPHONE ENCOUNTER
Called patient, informed her that she is about due to have the repeat pelvic u/s done. Patient would like to call and schedule this herself. Patient has appointment set up with PCP 3/16/18. I asked for patient to if possible schedule the u/s prior to her appointment with Alize so results can be discussed. She stated understanding ACase/MA

## 2018-03-15 ENCOUNTER — TELEPHONE (OUTPATIENT)
Dept: FAMILY MEDICINE | Facility: CLINIC | Age: 28
End: 2018-03-15

## 2018-03-15 DIAGNOSIS — F43.21 ADJUSTMENT DISORDER WITH DEPRESSED MOOD: Primary | ICD-10-CM

## 2018-03-16 NOTE — TELEPHONE ENCOUNTER
Refill for Zoloft has been sent.  She does have refills ordered for her levothyroxine, she should just call pharmacy to have that refilled

## 2018-04-13 ENCOUNTER — OFFICE VISIT (OUTPATIENT)
Dept: FAMILY MEDICINE | Facility: CLINIC | Age: 28
End: 2018-04-13
Payer: COMMERCIAL

## 2018-04-13 VITALS
TEMPERATURE: 97.9 F | DIASTOLIC BLOOD PRESSURE: 86 MMHG | HEART RATE: 90 BPM | BODY MASS INDEX: 30.22 KG/M2 | SYSTOLIC BLOOD PRESSURE: 126 MMHG | HEIGHT: 64 IN | WEIGHT: 177 LBS | OXYGEN SATURATION: 99 %

## 2018-04-13 DIAGNOSIS — Z00.01 ENCOUNTER FOR GENERAL ADULT MEDICAL EXAMINATION WITH ABNORMAL FINDINGS: Primary | ICD-10-CM

## 2018-04-13 DIAGNOSIS — F43.21 ADJUSTMENT DISORDER WITH DEPRESSED MOOD: ICD-10-CM

## 2018-04-13 DIAGNOSIS — Z23 NEED FOR TDAP VACCINATION: ICD-10-CM

## 2018-04-13 DIAGNOSIS — R06.83 SNORING: ICD-10-CM

## 2018-04-13 PROCEDURE — 99395 PREV VISIT EST AGE 18-39: CPT | Mod: 25 | Performed by: NURSE PRACTITIONER

## 2018-04-13 PROCEDURE — G0145 SCR C/V CYTO,THINLAYER,RESCR: HCPCS | Performed by: NURSE PRACTITIONER

## 2018-04-13 PROCEDURE — 87624 HPV HI-RISK TYP POOLED RSLT: CPT | Performed by: NURSE PRACTITIONER

## 2018-04-13 PROCEDURE — 99213 OFFICE O/P EST LOW 20 MIN: CPT | Mod: 25 | Performed by: NURSE PRACTITIONER

## 2018-04-13 PROCEDURE — 90715 TDAP VACCINE 7 YRS/> IM: CPT | Performed by: NURSE PRACTITIONER

## 2018-04-13 PROCEDURE — 90471 IMMUNIZATION ADMIN: CPT | Performed by: NURSE PRACTITIONER

## 2018-04-13 RX ORDER — SERTRALINE HYDROCHLORIDE 100 MG/1
100 TABLET, FILM COATED ORAL DAILY
Qty: 90 TABLET | Refills: 0 | Status: SHIPPED | OUTPATIENT
Start: 2018-04-13 | End: 2018-07-20

## 2018-04-13 ASSESSMENT — ANXIETY QUESTIONNAIRES
1. FEELING NERVOUS, ANXIOUS, OR ON EDGE: NEARLY EVERY DAY
GAD7 TOTAL SCORE: 19
2. NOT BEING ABLE TO STOP OR CONTROL WORRYING: NEARLY EVERY DAY
5. BEING SO RESTLESS THAT IT IS HARD TO SIT STILL: SEVERAL DAYS
7. FEELING AFRAID AS IF SOMETHING AWFUL MIGHT HAPPEN: NEARLY EVERY DAY
6. BECOMING EASILY ANNOYED OR IRRITABLE: NEARLY EVERY DAY
IF YOU CHECKED OFF ANY PROBLEMS ON THIS QUESTIONNAIRE, HOW DIFFICULT HAVE THESE PROBLEMS MADE IT FOR YOU TO DO YOUR WORK, TAKE CARE OF THINGS AT HOME, OR GET ALONG WITH OTHER PEOPLE: SOMEWHAT DIFFICULT
3. WORRYING TOO MUCH ABOUT DIFFERENT THINGS: NEARLY EVERY DAY

## 2018-04-13 ASSESSMENT — PATIENT HEALTH QUESTIONNAIRE - PHQ9: 5. POOR APPETITE OR OVEREATING: NEARLY EVERY DAY

## 2018-04-13 NOTE — MR AVS SNAPSHOT
After Visit Summary   4/13/2018    Leah Cox    MRN: [id number]           Patient Information     Date Of Birth          1990        Visit Information        Provider Department      4/13/2018 10:30 AM Alize Ragland APRN [facility]        Today's Diagnoses     Encounter for general adult medical examination with abnormal findings    -  1    Adjustment disorder with depressed mood        Snoring        Need for Tdap vaccination          Care Instructions      Preventive Health Recommendations  Female Ages 26 - 39  Yearly exam:   See your health care provider every year in order to    Review health changes.     Discuss preventive care.      Review your medicines if you your doctor has prescribed any.    Until age 30: Get a Pap test every three years (more often if you have had an abnormal result).    After age 30: Talk to your doctor about whether you should have a Pap test every 3 years or have a Pap test with HPV screening every 5 years.   You do not need a Pap test if your uterus was removed (hysterectomy) and you have not had cancer.  You should be tested each year for STDs (sexually transmitted diseases), if you're at risk.   Talk to your provider about how often to have your cholesterol checked.  If you are at risk for diabetes, you should have a diabetes test (fasting glucose).  Shots: Get a flu shot each year. Get a tetanus shot every 10 years.   Nutrition:     Eat at least 5 servings of fruits and vegetables each day.    Eat whole-grain bread, whole-wheat pasta and brown rice instead of white grains and rice.    Talk to your provider about Calcium and Vitamin D.     Lifestyle    Exercise at least 150 minutes a week (30 minutes a day, 5 days of the week). This will help you control your weight and prevent disease.    Limit alcohol to one drink per day.    No smoking.     Wear sunscreen to prevent skin cancer.    See your dentist every six months for an exam  "and cleaning.            Follow-ups after your visit        Who to contact     If you have questions or need follow up information about today's clinic visit or your schedule please contact [facility] directly at [phone].  Normal or non-critical lab and imaging results will be communicated to you by MyChart, letter or phone within 4 business days after the clinic has received the results. If you do not hear from us within 7 days, please contact the clinic through MyChart or phone. If you have a critical or abnormal lab result, we will notify you by phone as soon as possible.  Submit refill requests through [employer] or call your pharmacy and they will forward the refill request to us. Please allow 3 business days for your refill to be completed.          Additional Information About Your Visit        [email or web address] Information     [employer] lets you send messages to your doctor, view your test results, renew your prescriptions, schedule appointments and more. To sign up, go to [email or web address] . Click on \"Log in\" on the left side of the screen, which will take you to the Welcome page. Then click on \"Sign up Now\" on the right side of the page.     You will be asked to enter the access code listed below, as well as some personal information. Please follow the directions to create your username and password.     Your access code is: [email or web address]  Expires: 2018  4:31 PM     Your access code will  in 90 days. If you need help or a new code, please call your [facility] clinic or [phone].        Care EveryWhere ID     This is your Care EveryWhere ID. This could be used by other organizations to access your [facility] medical records  [id number]        Your Vitals Were     Pulse Temperature Height Last Period Pulse Oximetry BMI (Body Mass Index)    90 97.9  F (36.6  C) (Temporal) 5' 3.5\" (1.613 m) 2018 99% 30.86 kg/m2       Blood Pressure from Last 3 Encounters:   18 126/86 "   01/26/18 126/70   12/08/17 134/85    Weight from Last 3 Encounters:   04/13/18 177 lb (80.3 kg)   01/26/18 185 lb (83.9 kg)   12/08/17 183 lb (83 kg)              We Performed the Following     DEPRESSION ACTION PLAN (DAP)     Pap imaged thin layer screen reflex to HPV if ASCUS - recommend age 25 - 29     TDAP VACCINE (ADACEL)     VACCINE ADMINISTRATION, INITIAL          Today's Medication Changes          These changes are accurate as of 4/13/18 11:01 AM.  If you have any questions, ask your nurse or doctor.               These medicines have changed or have updated prescriptions.        Dose/Directions    * sertraline 50 MG tablet   Commonly known as:  ZOLOFT   This may have changed:  Another medication with the same name was added. Make sure you understand how and when to take each.   Used for:  Adjustment disorder with depressed mood   Changed by:  Alize Ragland APRN CNP        Dose:  50 mg   Take 1 tablet (50 mg) by mouth daily   Quantity:  30 tablet   Refills:  0       * sertraline 100 MG tablet   Commonly known as:  ZOLOFT   This may have changed:  You were already taking a medication with the same name, and this prescription was added. Make sure you understand how and when to take each.   Used for:  Adjustment disorder with depressed mood   Changed by:  Alize Ragland APRN CNP        Dose:  100 mg   Take 1 tablet (100 mg) by mouth daily   Quantity:  90 tablet   Refills:  0       * Notice:  This list has 2 medication(s) that are the same as other medications prescribed for you. Read the directions carefully, and ask your doctor or other care provider to review them with you.         Where to get your medicines      These medications were sent to [facility], [place] - [address] [place] [address]     Phone:  [phone]     sertraline 100 MG tablet                Primary Care Provider Office Phone # Fax #    NADJA Pierce CNP  [phone]       [address] [place] [address]        Equal Access to Services     [facility] : Hadvilla aad ku hadshivzane Radha, wajhonatanda sidneybretha, ken delmijudida araceliyarely, maday soniain hayaadavid charlesdanie gerber latisha finley. So [facility] [phone].    ATENCIÓN: Si habla español, tiene a varghese disposición servicios gratuitos de asistencia lingüística. Angi al [phone].    We comply with applicable federal civil rights laws and [place] laws. We do not discriminate on the basis of race, color, national origin, age, disability, sex, sexual orientation, or gender identity.            Thank you!     Thank you for choosing [facility]  for your care. Our goal is always to provide you with excellent care. Hearing back from our patients is one way we can continue to improve our services. Please take a few minutes to complete the written survey that you may receive in the mail after your visit with us. Thank you!             Your Updated Medication List - Protect others around you: Learn how to safely use, store and throw away your medicines at www.disposemymeds.org.          This list is accurate as of 4/13/18 11:01 AM.  Always use your most recent med list.                   Brand Name Dispense Instructions for use Diagnosis    levothyroxine 75 MCG tablet    SYNTHROID/LEVOTHROID    90 tablet    Take 1 tablet (75 mcg) by mouth daily    Hypothyroidism, unspecified type       * sertraline 50 MG tablet    ZOLOFT    30 tablet    Take 1 tablet (50 mg) by mouth daily    Adjustment disorder with depressed mood       * sertraline 100 MG tablet    ZOLOFT    90 tablet    Take 1 tablet (100 mg) by mouth daily    Adjustment disorder with depressed mood       * Notice:  This list has 2 medication(s) that are the same as other medications prescribed for you. Read the directions carefully, and ask your doctor or other care provider to review them with you.

## 2018-04-13 NOTE — PROGRESS NOTES
Answers for HPI/ROS submitted by the patient on 4/13/2018   Annual Exam:  Getting at least 3 servings of Calcium per day:: NO  Bi-annual eye exam:: NO  Dental care twice a year:: NO  Sleep apnea or symptoms of sleep apnea:: Excessive snoring  Diet:: Other  Taking medications regularly:: Yes  Medication side effects:: None  Additional concerns today:: No  PHQ-2 Score: 2

## 2018-04-13 NOTE — NURSING NOTE
"Chief Complaint   Patient presents with     Physical       Initial There were no vitals taken for this visit. Estimated body mass index is 32.26 kg/(m^2) as calculated from the following:    Height as of 1/26/18: 5' 3.5\" (1.613 m).    Weight as of 1/26/18: 185 lb (83.9 kg).  Medication Reconciliation: complete  "
(0) independent

## 2018-04-13 NOTE — LETTER
April 24, 2018    Leah Cox  [address] [place] [id number]    Dear [place],  We are happy to inform you that your PAP smear result from 4/13/18 is normal.  We are now able to do a follow up test on PAP smears. The DNA test is for HPV (Human Papilloma Virus). Cervical cancer is closely linked with certain types of HPV. Your results showed no evidence of high risk HPV.  Therefore we recommend you return in 3 years for your next pap smear and HPV test.  You will still need to return to the clinic every year for an annual exam and other preventive tests.  Please contact the clinic at [phone] with any questions.  Sincerely,    NADJA Pierce CNP/rlm

## 2018-04-13 NOTE — PROGRESS NOTES
SUBJECTIVE:   CC: Leah Cox is an 27 year old woman who presents for preventive health visit.     Physical   Annual:     Getting at least 3 servings of Calcium per day::  NO    Bi-annual eye exam::  NO    Dental care twice a year::  NO    Sleep apnea or symptoms of sleep apnea::  Excessive snoring    Diet::  Other    Taking medications regularly::  Yes    Medication side effects::  None    Additional concerns today::  No            She is experiencing worsening depression symptoms.  She unfortunately followed her significant other dead at home in December, apparently some cardiac related issues.  She is having difficult time dealing with this.  She is taking Zoloft 50 mg daily, she tends to isolate herself, states she spends most of her time to sitting in her room watching TV or watching old movies of she and her significant other together.  She reports anhedonia feeling of hopelessness, she also has a lot of difficulty sleeping.  She has trouble falling asleep, and wakes herself up because of snoring.  She is fatigued, and feels like a failure.  She does have some thoughts of being better off dead several days, but denies having any actual intent.  She is also experiencing anxiety, feeling very nervous and unable to stop worrying about things, she tends to ruminate on issues and has a sense of impending doom.  She has not gone to any grief counseling, or counseling for depression management        Today's PHQ-2 Score:   PHQ-2 ( 1999 [employer]) 4/13/2018   Q1: Little interest or pleasure in doing things 1   Q2: Feeling down, depressed or hopeless 1   PHQ-2 Score 2   Q1: Little interest or pleasure in doing things Several days   Q2: Feeling down, depressed or hopeless Several days   PHQ-2 Score 2       Abuse: Current or Past(Physical, Sexual or Emotional)- No  Do you feel safe in your environment - Yes    Social History   Substance Use Topics     Smoking status: Current Every Day Smoker     Packs/day: 0.25      Smokeless tobacco: Never Used     Alcohol use 0.0 oz/week     0 Standard drinks or equivalent per week      Comment: occ.     Alcohol Use 4/13/2018   If you drink alcohol do you typically have greater than 3 drinks per day OR greater than 7 drinks per week? No       Reviewed orders with patient.  Reviewed health maintenance and updated orders accordingly - Yes  BP Readings from Last 3 Encounters:   04/13/18 126/86   01/26/18 126/70   12/08/17 134/85    Wt Readings from Last 3 Encounters:   04/13/18 177 lb (80.3 kg)   01/26/18 185 lb (83.9 kg)   12/08/17 183 lb (83 kg)                    Alternate mammogram schedule due to breast cancer history her mother had breast cancer at age 18.  Patient has very small breasts, but may want to consider getting a mammogram sometime within the near future for a baseline.    Pertinent mammograms are reviewed under the imaging tab.  History of abnormal Pap smear:   YES - SUZAN 2/3 on biopsy - PAP/HPV co-testing at 12, 24 months.  If two negative results repeat co-testing in 3 years, if negative then routine screening.  Last 3 Pap and HPV Results:   PAP / HPV Latest Ref Rng & Units 12/8/2015   PAP - NIL   HPV 16 DNA NEG Negative   HPV 18 DNA NEG Negative   OTHER HR HPV NEG Negative       Reviewed and updated as needed this visit by clinical staff  Tobacco  Allergies  Med Hx  Surg Hx  Fam Hx  Soc Hx        Reviewed and updated as needed this visit by Provider        Past Medical History:   Diagnosis Date     ASCUS with positive high risk HPV cervical 09/26/2012     H/O colposcopy with cervical biopsy 11/07/2012    SUZAN 2-3     S/P LEEP of cervix 01/23/2013    SUZAN 2 with free margins      Past Surgical History:   Procedure Laterality Date     LEEP TX, CERVICAL  01/23/2013    SUZAN 2 with free margins - Care Everywhere.     RELEASE CARPAL TUNNEL Right 10/5/2016    Procedure: RELEASE CARPAL TUNNEL;  Surgeon: Christian Sebastian MD;  Location: [facility]     Obstetric History     No data  "available          Review of Systems  C: NEGATIVE for fever, chills, change in weight  I: NEGATIVE for worrisome rashes, moles or lesions  E: NEGATIVE for vision changes or irritation  ENT: NEGATIVE for ear, mouth and throat problems  R: NEGATIVE for significant cough or SOB  B: NEGATIVE for masses, tenderness or discharge  CV: NEGATIVE for chest pain, palpitations or peripheral edema  GI: NEGATIVE for nausea, abdominal pain, heartburn, or change in bowel habits  : NEGATIVE for unusual urinary or vaginal symptoms. Periods are regular.  M: NEGATIVE for significant arthralgias or myalgia  N: NEGATIVE for weakness, dizziness or paresthesias  PSYCHIATRIC: POSITIVE foranxiety, depressed mood, fatigue and insomnia      OBJECTIVE:   /86 (BP Location: Right arm, Patient Position: Chair, Cuff Size: Adult Regular)  Pulse 90  Temp 97.9  F (36.6  C) (Temporal)  Ht 5' 3.5\" (1.613 m)  Wt 177 lb (80.3 kg)  LMP 03/13/2018  SpO2 99%  BMI 30.86 kg/m2  Physical Exam  GENERAL: healthy, alert and no distress  EYES: Eyes grossly normal to inspection, PERRL and conjunctivae and sclerae normal  HENT: ear canals and TM's normal, nose and mouth without ulcers or lesions  NECK: no adenopathy, no asymmetry, masses, or scars and thyroid normal to palpation  RESP: lungs clear to auscultation - no rales, rhonchi or wheezes  BREAST: normal without masses, tenderness or nipple discharge and no palpable axillary masses or adenopathy  CV: regular rate and rhythm, normal S1 S2, no S3 or S4, no murmur, click or rub, no peripheral edema and peripheral pulses strong  ABDOMEN: soft, nontender, no hepatosplenomegaly, no masses and bowel sounds normal   (female): normal female external genitalia, normal urethral meatus, vaginal mucosa pink, moist, well rugated, and normal cervix/adnexa/uterus without masses or discharge  MS: no gross musculoskeletal defects noted, no edema  SKIN: no suspicious lesions or rashes  NEURO: Normal strength and " "tone, mentation intact and speech normal  PSYCH: mentation appears normal, affect normal/bright    ASSESSMENT/PLAN:       ICD-10-CM    1. Encounter for general adult medical examination with abnormal findings Z00.01 Pap imaged thin layer screen reflex to HPV if ASCUS - recommend age 25 - 29   2. Adjustment disorder with depressed mood F43.21 TDAP VACCINE (ADACEL)     VACCINE ADMINISTRATION, INITIAL     sertraline (ZOLOFT) 100 MG tablet   3. Snoring R06.83    4. Need for Tdap vaccination Z23      Increase Zoloft to 100 mg daily, return to clinic in 1 month for recheck  We will refer to counseling  We will refer to sleep clinic for further evaluation and sleep study    COUNSELING:  Reviewed preventive health counseling, as reflected in patient instructions       Regular exercise       Healthy diet/nutrition       Immunizations    Vaccinated for: TDAP             Osteoporosis Prevention/Bone Health    BP Screening:   Last 3 BP Readings:    BP Readings from Last 3 Encounters:   04/13/18 126/86   01/26/18 126/70   12/08/17 134/85       The following was recommended to the patient:  Re-screen BP within a year and recommended lifestyle modifications     reports that she has been smoking.  She has been smoking about 0.25 packs per day. She has never used smokeless tobacco.  Tobacco Cessation Action Plan: Information offered: Patient not interested at this time  Estimated body mass index is 30.86 kg/(m^2) as calculated from the following:    Height as of this encounter: 5' 3.5\" (1.613 m).    Weight as of this encounter: 177 lb (80.3 kg).   Weight management plan: Discussed healthy diet and exercise guidelines and patient will follow up in 12 months in clinic to re-evaluate.    Counseling Resources:  ATP IV Guidelines  Pooled Cohorts Equation Calculator  Breast Cancer Risk Calculator  FRAX Risk Assessment  ICSI Preventive Guidelines  Dietary Guidelines for Americans, 2010  USDA's MyPlate  ASA Prophylaxis  Lung CA " Screening    NADJA Pierce [facility]  Screening Questionnaire for Adult Immunization    Are you sick today?   No   Do you have allergies to medications, food, a vaccine component or latex?   Yes   Have you ever had a serious reaction after receiving a vaccination?   No   Do you have a long-term health problem with heart disease, lung disease, asthma, kidney disease, metabolic disease (e.g. diabetes), anemia, or other blood disorder?   No   Do you have cancer, leukemia, HIV/AIDS, or any other immune system problem?   No   In the past 3 months, have you taken medications that affect  your immune system, such as prednisone, other steroids, or anticancer drugs; drugs for the treatment of rheumatoid arthritis, Crohn s disease, or psoriasis; or have you had radiation treatments?   No   Have you had a seizure, or a brain or other nervous system problem?   No   During the past year, have you received a transfusion of blood or blood     products, or been given immune (gamma) globulin or antiviral drug?   No   For women: Are you pregnant or is there a chance you could become        pregnant during the next month?   No   Have you received any vaccinations in the past 4 weeks?   No     Immunization questionnaire was positive for at least one answer.  Notified provider.        Per orders of Alize Ragland, injection of Tdap given by Sara Garcia. Patient instructed to remain in clinic for 15 minutes afterwards, and to report any adverse reaction to me immediately.       Screening performed by Sara Garcia on 4/13/2018 at 10:32 AM.  Verified patient's name and date of birth to confirm prior to injection. Instructed patient to remain in clinic 20 minutes following injection, in case allergic reaction occurs seek medical staff. A Case MA

## 2018-04-13 NOTE — LETTER
My Depression Action Plan  Name: Leah Cox   Date of Birth 1990  Date: 4/13/2018    My doctor: Alize Ragland   My clinic: [address] [address] [phone]          GREEN    ZONE   Good Control    What it looks like:     Things are going generally well. You have normal up s and down s. You may even feel depressed from time to time, but bad moods usually last less than a day.   What you need to do:  1. Continue to care for yourself (see self care plan)  2. Check your depression survival kit and update it as needed  3. Follow your physician s recommendations including any medication.  4. Do not stop taking medication unless you consult with your physician first.           YELLOW         ZONE Getting Worse    What it looks like:     Depression is starting to interfere with your life.     It may be hard to get out of bed; you may be starting to isolate yourself from others.    Symptoms of depression are starting to last most all day and this has happened for several days.     You may have suicidal thoughts but they are not constant.   What you need to do:     1. Call your care team, your response to treatment will improve if you keep your care team informed of your progress. Yellow periods are signs an adjustment may need to be made.     2. Continue your self-care, even if you have to fake it!    3. Talk to someone in your support network    4. Open up your depression survival kit           RED    ZONE Medical Alert - Get Help    What it looks like:     Depression is seriously interfering with your life.     You may experience these or other symptoms: You can t get out of bed most days, can t work or engage in other necessary activities, you have trouble taking care of basic hygiene, or basic responsibilities, thoughts of suicide or death that will not go away, self-injurious behavior.     What you need to do:  1. Call your care team  and request a same-day appointment. If they are not available (weekends or after hours) call your local crisis line, emergency room or 911.            Depression Self Care Plan / Survival Kit    Self-Care for Depression  Here s the deal. Your body and mind are really not as separate as most people think.  What you do and think affects how you feel and how you feel influences what you do and think. This means if you do things that people who feel good do, it will help you feel better.  Sometimes this is all it takes.  There is also a place for medication and therapy depending on how severe your depression is, so be sure to consult with your medical provider and/ or Behavioral Health Consultant if your symptoms are worsening or not improving.     In order to better manage my stress, I will:    Exercise  Get some form of exercise, every day. This will help reduce pain and release endorphins, the  feel good  chemicals in your brain. This is almost as good as taking antidepressants!  This is not the same as joining a gym and then never going! (they count on that by the way ) It can be as simple as just going for a walk or doing some gardening, anything that will get you moving.      Hygiene   Maintain good hygiene (Get out of bed in the morning, Make your bed, Brush your teeth, Take a shower, and Get dressed like you were going to work, even if you are unemployed).  If your clothes don't fit try to get ones that do.    Diet  I will strive to eat foods that are good for me, drink plenty of water, and avoid excessive sugar, caffeine, alcohol, and other mood-altering substances.  Some foods that are helpful in depression are: complex carbohydrates, B vitamins, flaxseed, fish or fish oil, fresh fruits and vegetables.    Psychotherapy  I agree to participate in Individual Therapy (if recommended).    Medication  If prescribed medications, I agree to take them.  Missing doses can result in serious side effects.  I understand  that drinking alcohol, or other illicit drug use, may cause potential side effects.  I will not stop my medication abruptly without first discussing it with my provider.    Staying Connected With Others  I will stay in touch with my friends, family members, and my primary care provider/team.    Use your imagination  Be creative.  We all have a creative side; it doesn t matter if it s oil painting, sand castles, or mud pies! This will also kick up the endorphins.    Witness Beauty  (AKA stop and smell the roses) Take a look outside, even in mid-winter. Notice colors, textures. Watch the squirrels and birds.     Service to others  Be of service to others.  There is always someone else in need.  By helping others we can  get out of ourselves  and remember the really important things.  This also provides opportunities for practicing all the other parts of the program.    Humor  Laugh and be silly!  Adjust your TV habits for less news and crime-drama and more comedy.    Control your stress  Try breathing deep, massage therapy, biofeedback, and meditation. Find time to relax each day.     My support system    Clinic Contact:  Phone number:    Contact 1:  Phone number:    Contact 2:  Phone number:    [employer]/:  Phone number:    Therapist:  Phone number:    Local crisis center:    Phone number:    Other community support:  Phone number:

## 2018-04-14 ASSESSMENT — PATIENT HEALTH QUESTIONNAIRE - PHQ9: SUM OF ALL RESPONSES TO PHQ QUESTIONS 1-9: 20

## 2018-04-14 ASSESSMENT — ANXIETY QUESTIONNAIRES: GAD7 TOTAL SCORE: 19

## 2018-04-16 ENCOUNTER — TELEPHONE (OUTPATIENT)
Dept: BEHAVIORAL HEALTH | Facility: CLINIC | Age: 28
End: 2018-04-16

## 2018-04-16 NOTE — TELEPHONE ENCOUNTER
Phone Encounter   C made attempt to reach patient, by PCP request. LM requesting patient return call and provided call back number.

## 2018-04-16 NOTE — TELEPHONE ENCOUNTER
Patient called back but Fely was with a patient. Please call her back when able.   Thank you,  Mary Sue   for [facility]

## 2018-04-17 LAB
COPATH REPORT: NORMAL
PAP: NORMAL

## 2018-04-17 NOTE — TELEPHONE ENCOUNTER
Phone Encounter   Patient returned call to [facility]. Introduced self and role. Patient scheduled an initial visit with this writer for 4/20/18.

## 2018-04-19 LAB
FINAL DIAGNOSIS: NORMAL
HPV HR 12 DNA CVX QL NAA+PROBE: NEGATIVE
HPV16 DNA SPEC QL NAA+PROBE: NEGATIVE
HPV18 DNA SPEC QL NAA+PROBE: NEGATIVE
SPECIMEN DESCRIPTION: NORMAL
SPECIMEN SOURCE CVX/VAG CYTO: NORMAL

## 2018-04-20 ENCOUNTER — OFFICE VISIT (OUTPATIENT)
Dept: BEHAVIORAL HEALTH | Facility: CLINIC | Age: 28
End: 2018-04-20
Payer: COMMERCIAL

## 2018-04-20 DIAGNOSIS — F41.1 GAD (GENERALIZED ANXIETY DISORDER): ICD-10-CM

## 2018-04-20 DIAGNOSIS — F33.1 MAJOR DEPRESSIVE DISORDER, RECURRENT EPISODE, MODERATE (H): Primary | ICD-10-CM

## 2018-04-20 PROCEDURE — 90791 PSYCH DIAGNOSTIC EVALUATION: CPT | Performed by: MARRIAGE & FAMILY THERAPIST

## 2018-04-20 NOTE — MR AVS SNAPSHOT
"              After Visit Summary   4/20/2018    Leah Cox    MRN: [id number]           Patient Information     Date Of Birth          1990        Visit Information        Provider Department      4/20/2018 3:00 PM Fely Malloy LMFT [facility]        Today's Diagnoses     Major depressive disorder, recurrent episode, moderate (H)    -  1    RENAY (generalized anxiety disorder)           Follow-ups after your visit        Your next 10 appointments already scheduled     Jun 21, 2018  4:15 PM CDT   New Sleep Patient with Ramses Chong MD   [facility] ([facility] [place])    [address] [place] [address] [phone]              Who to contact     If you have questions or need follow up information about today's clinic visit or your schedule please contact [facility] directly at [phone].  Normal or non-critical lab and imaging results will be communicated to you by MyChart, letter or phone within 4 business days after the clinic has received the results. If you do not hear from us within 7 days, please contact the clinic through MyChart or phone. If you have a critical or abnormal lab result, we will notify you by phone as soon as possible.  Submit refill requests through [employer] or call your pharmacy and they will forward the refill request to us. Please allow 3 business days for your refill to be completed.          Additional Information About Your Visit        MyChart Information     [employer] lets you send messages to your doctor, view your test results, renew your prescriptions, schedule appointments and more. To sign up, go to [email or web address] . Click on \"Log in\" on the left side of the screen, which will take you to the Welcome page. Then click on \"Sign up Now\" on the right side of the page.     You will be asked to enter the access code listed below, as well as some personal information. Please " follow the directions to create your username and password.     Your access code is: [email or web address]  Expires: 2018 12:03 PM     Your access code will  in 90 days. If you need help or a new code, please call your [facility] clinic or [phone].        Care EveryWhere ID     This is your Care EveryWhere ID. This could be used by other organizations to access your [facility] medical records  [id number]         Blood Pressure from Last 3 Encounters:   18 131/85   18 126/86   18 126/70    Weight from Last 3 Encounters:   18 80.3 kg (177 lb)   18 83.9 kg (185 lb)   17 83 kg (183 lb)              Today, you had the following     No orders found for display       Primary Care Provider Office Phone # Fax #    Alize Sofía Ragland, NADJA [facility] [phone] [address] [place] [address]        Equal Access to Services     [facility] : Hadii aad ku hadasho Soomaali, waaxda luqadaha, qaybta kaalmada adeegyada, waxay idiin hayadilsonn azael good . So [facility] [phone].    ATENCIÓN: Si habla español, tiene a varghese disposición servicios gratuitos de asistencia lingüística. Llame al [phone].    We comply with applicable federal civil rights laws and [place] laws. We do not discriminate on the basis of race, color, national origin, age, disability, sex, sexual orientation, or gender identity.            Thank you!     Thank you for choosing [facility]  for your care. Our goal is always to provide you with excellent care. Hearing back from our patients is one way we can continue to improve our services. Please take a few minutes to complete the written survey that you may receive in the mail after your visit with us. Thank you!             Your Updated Medication List - Protect others around you: Learn how to safely use, store and throw away your medicines at www.disposemymeds.org.          This list is accurate as of 18 11:59 PM.  Always  use your most recent med list.                   Brand Name Dispense Instructions for use Diagnosis    levothyroxine 75 MCG tablet    SYNTHROID/LEVOTHROID    90 tablet    Take 1 tablet (75 mcg) by mouth daily    Hypothyroidism, unspecified type       * sertraline 50 MG tablet    ZOLOFT    30 tablet    Take 1 tablet (50 mg) by mouth daily    Adjustment disorder with depressed mood       * sertraline 100 MG tablet    ZOLOFT    90 tablet    Take 1 tablet (100 mg) by mouth daily    Adjustment disorder with depressed mood       * Notice:  This list has 2 medication(s) that are the same as other medications prescribed for you. Read the directions carefully, and ask your doctor or other care provider to review them with you.
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
## 2018-05-01 ENCOUNTER — OFFICE VISIT (OUTPATIENT)
Dept: URGENT CARE | Facility: RETAIL CLINIC | Age: 28
End: 2018-05-01
Payer: COMMERCIAL

## 2018-05-01 VITALS
HEART RATE: 89 BPM | OXYGEN SATURATION: 97 % | TEMPERATURE: 96.8 F | SYSTOLIC BLOOD PRESSURE: 131 MMHG | DIASTOLIC BLOOD PRESSURE: 85 MMHG

## 2018-05-01 DIAGNOSIS — J22 CHEST COLD: ICD-10-CM

## 2018-05-01 DIAGNOSIS — F17.200 TOBACCO USE DISORDER: ICD-10-CM

## 2018-05-01 DIAGNOSIS — J02.9 ACUTE PHARYNGITIS, UNSPECIFIED ETIOLOGY: Primary | ICD-10-CM

## 2018-05-01 DIAGNOSIS — R05.9 COUGH: ICD-10-CM

## 2018-05-01 LAB — S PYO AG THROAT QL IA.RAPID: NORMAL

## 2018-05-01 PROCEDURE — 87880 STREP A ASSAY W/OPTIC: CPT | Mod: QW | Performed by: PHYSICIAN ASSISTANT

## 2018-05-01 PROCEDURE — 87081 CULTURE SCREEN ONLY: CPT | Performed by: PHYSICIAN ASSISTANT

## 2018-05-01 PROCEDURE — 99213 OFFICE O/P EST LOW 20 MIN: CPT | Performed by: PHYSICIAN ASSISTANT

## 2018-05-01 RX ORDER — BENZONATATE 200 MG/1
200 CAPSULE ORAL 3 TIMES DAILY PRN
Qty: 21 CAPSULE | Refills: 0 | Status: SHIPPED | OUTPATIENT
Start: 2018-05-01 | End: 2018-06-21

## 2018-05-01 NOTE — PROGRESS NOTES
Chief Complaint   Patient presents with     Cough     dry cough x2 days     Pharyngitis     s/t x 2 days         SUBJECTIVE:   Pt. presenting to [facility] -  with a chief complaint of slight ST and some dry cough last few days. Minimal nasal congestion..   See CC.  Cough nonproductive.No SOB or chest pain.   Hx of asthma no.  Onset of symptoms 2 days  Course of illness is same.    Severity mild  Current and Associated symptoms: cough - non-productive and sore throat  Treatment measures tried include None tried.  Predisposing factors include tobacco use.  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 No nausea, vomiting or diarrhea.   No bowel or bladder changes   MSK - no joint pain or swelling   Skin: No rashes    Past Medical History:   Diagnosis Date     ASCUS with positive high risk HPV cervical 09/26/2012     H/O colposcopy with cervical biopsy 11/07/2012    SUZAN 2-3     S/P LEEP of cervix 01/23/2013    SUZAN 2 with free margins     Past Surgical History:   Procedure Laterality Date     LEEP TX, CERVICAL  01/23/2013    SUZAN 2 with free margins - Care Everywhere.     RELEASE CARPAL TUNNEL Right 10/5/2016    Procedure: RELEASE CARPAL TUNNEL;  Surgeon: Christian Sebastian MD;  Location: [facility] OR     Patient Active Problem List   Diagnosis     Irregular periods     Pelvic pain in female     Carpal tunnel syndrome of right wrist     Adjustment disorder with depressed mood     Excessive or frequent menstruation     Ganglion cyst of dorsum of right wrist     Metacarpal boss     Segmental dysfunction of sacral region     Bilateral low back pain with right-sided sciatica     Segmental dysfunction of lumbar region     Segmental dysfunction of thoracic region     S/P LEEP of cervix     Current Outpatient Prescriptions   Medication     levothyroxine  (SYNTHROID/LEVOTHROID) 75 MCG tablet     sertraline (ZOLOFT) 100 MG tablet     sertraline (ZOLOFT) 50 MG tablet     No current facility-administered medications for this visit.        OBJECTIVE:  /85 (BP Location: Right arm, Patient Position: Chair, Cuff Size: Adult Regular)  Pulse 89  Temp 96.8  F (36  C) (Temporal)  SpO2 97%    GENERAL APPEARANCE: cooperative, alert and no distress. Appears well hydrated.  EYES: conjunctiva clear  HENT: Rt ear canal  clear and TM normal   Lt ear canal some soft cerumen mid canal and TM normal   Nose minimal congestion. no discharge  Mouth without ulcers or lesions. mild erythema. no exudate.  NECK: supple, few small shoddy NT ant nodes. No  posterior nodes.  RESP: lungs clear to auscultation - no rales, rhonchi or wheezes. Breathing easily.  CV: regular rates and rhythm  ABDOMEN:  soft, nontender, no HSM or masses and bowel sounds normal   SKIN: no suspicious lesions or rashes  no tenderness to palpate over  sinus areas.    Rapid strep neg    ASSESSMENT:     Acute pharyngitis, unspecified etiology  Cough  Tobacco use disorder  Chest cold        PLAN:  Symptomatic measures   Throat culture pending - will be notified of positive results only.  Discussed with pt this appears to be a viral etiology and antibiotics do not help viral infections. If symptoms change, persist or increase then reevaluation is needed.  OTC cough suppressant/expectorant discussed -see Rx  Prescriptions as below. Discussed indications, dosing, side affects and adverse reactions of medications with  Patient -Tessalon Perles  Salt water gargles -throat lozenges or honey/lemon tea if soothing   Smoking discouraged    Stay in clean air environment.  > rest.  > fluids.  Contagiousness and hygiene discussed.  Fever and pain  control measures discussed.   If unable to swallow or any breathing difficulty to go to ED AVS given and discussed:  Patient Instructions      * PHARYNGITIS (Sore Throat),REPORT  PENDING    Pharyngitis (sore throat) is often due to a virus, but can also be caused by the  strep  bacteria. This is called  strep throat . Both viral and strep infection can cause throat pain that is worse when swallowing, aching all over with headache and fever. Both types of infections are contagious. They may be spread by coughing, kissing or touching others after touching your mouth or nose, so wash your hands often.  A test has been done to determine whether or not you have strep throat. If it is positive for strep infection you will usually need to take antibiotics. If the test is negative, you probably have a viral pharyngitis, and antibiotic treatment will not help you recover.  HOME CARE:      If your symptoms are severe, rest at home for the first 2-3 days. If you are told that your test is positive for strep, you should be off work and school for the first two days of antibiotic treatment. After that, you will no longer be as contagious.    Children: Use acetaminophen (Tylenol) for fever, fussiness or discomfort. In infants over six months of age, you may use ibuprofen (Children's Motrin) instead of Tylenol. [NOTE: If your child has chronic liver or kidney disease or ever had a stomach ulcer or GI bleeding, talk with your doctor before using these medicines.]   (Aspirin should never be used in anyone under 18 years of age who is ill with a fever. It may cause severe liver damage.)  Adults: You may use acetaminophen (Tylenol) 650-1000 mg every 6 hours or ibuprofen (Motrin, Advil) 600 mg every 6-8 hours with food to control pain, if you are able to take these medicines. [NOTE: If you have chronic liver or kidney disease or ever had a stomach ulcer or GI bleeding, talk with your doctor before using these medicines.]    Throat lozenges or sprays (Chloraseptic and others), or gargling with warm salt water will reduce throat pain. Dissolve 1/2 teaspoon of salt in 1 glass of warm water. This is especially  useful just before meals.     FOLLOW UP with your doctor as advised by our staff if you are not improving over the next week.  GET PROMPT MEDICAL ATTENTION  if any of the following occur:    Fever over 101 F (38.3 C) for more than three days    New or worsening ear pain, sinus pain or headache    Unable to swallow liquids or open your mouth wide due to throat pain    Trouble breathing    Muffled voice    New rash       8087-3637 [employer]. [address] [place] [address], [place] [address]. All rights reserved. This information is not intended as a substitute for professional medical care. Always follow your healthcare professional's instructions.  This information has been modified by your health care provider with permission from the publisher.    .............  Throat culture pending - will be notified of positive results only.    Please FOLLOW UP at primary care clinic if not improving, new symptoms, worse or this does not resolve.  [facility]  [phone]      See letter for work  Pt is comfortable with this plan.  Electronically signed,  LAURA Gaston, PAC

## 2018-05-01 NOTE — MR AVS SNAPSHOT
After Visit Summary   5/1/2018    Leah Cox    MRN: [id number]           Patient Information     Date Of Birth          1990        Visit Information        Provider Department      5/1/2018 10:50 AM Eloise Gaston PA-C [facility]        Today's Diagnoses     Acute pharyngitis, unspecified etiology    -  1    Cough        Tobacco use disorder          Care Instructions       * PHARYNGITIS (Sore Throat),REPORT PENDING    Pharyngitis (sore throat) is often due to a virus, but can also be caused by the  strep  bacteria. This is called  strep throat . Both viral and strep infection can cause throat pain that is worse when swallowing, aching all over with headache and fever. Both types of infections are contagious. They may be spread by coughing, kissing or touching others after touching your mouth or nose, so wash your hands often.  A test has been done to determine whether or not you have strep throat. If it is positive for strep infection you will usually need to take antibiotics. If the test is negative, you probably have a viral pharyngitis, and antibiotic treatment will not help you recover.  HOME CARE:      If your symptoms are severe, rest at home for the first 2-3 days. If you are told that your test is positive for strep, you should be off work and school for the first two days of antibiotic treatment. After that, you will no longer be as contagious.    Children: Use acetaminophen (Tylenol) for fever, fussiness or discomfort. In infants over six months of age, you may use ibuprofen (Children's Motrin) instead of Tylenol. [NOTE: If your child has chronic liver or kidney disease or ever had a stomach ulcer or GI bleeding, talk with your doctor before using these medicines.]   (Aspirin should never be used in anyone under 18 years of age who is ill with a fever. It may cause severe liver damage.)  Adults: You may use acetaminophen (Tylenol) 650-1000 mg every 6  hours or ibuprofen (Motrin, Advil) 600 mg every 6-8 hours with food to control pain, if you are able to take these medicines. [NOTE: If you have chronic liver or kidney disease or ever had a stomach ulcer or GI bleeding, talk with your doctor before using these medicines.]    Throat lozenges or sprays (Chloraseptic and others), or gargling with warm salt water will reduce throat pain. Dissolve 1/2 teaspoon of salt in 1 glass of warm water. This is especially useful just before meals.     FOLLOW UP with your doctor as advised by our staff if you are not improving over the next week.  GET PROMPT MEDICAL ATTENTION  if any of the following occur:    Fever over 101 F (38.3 C) for more than three days    New or worsening ear pain, sinus pain or headache    Unable to swallow liquids or open your mouth wide due to throat pain    Trouble breathing    Muffled voice    New rash       5894-2575 [employer]. [address] [place] [address]. All rights reserved. This information is not intended as a substitute for professional medical care. Always follow your healthcare professional's instructions.  This information has been modified by your health care provider with permission from the publisher.    .............  Throat culture pending - will be notified of positive results only.    Please FOLLOW UP at primary care clinic if not improving, new symptoms, worse or this does not resolve.  [facility]  [phone]            Follow-ups after your visit        Your next 10 appointments already scheduled     Jun 21, 2018  4:15 PM CDT   New Sleep Patient with Ramses Chong MD   [facility] ([address] [address] [phone]              Who to contact     You can reach your care team any time of the day by calling [phone].  Notification of test results:  If you have an abnormal lab result, we will notify  "you by phone as soon as possible.         Additional Information About Your Visit        [employer] Information     ISORG lets you send messages to your doctor, view your test results, renew your prescriptions, schedule appointments and more. To sign up, go to [email or web address] . Click on \"Log in\" on the left side of the screen, which will take you to the Welcome page. Then click on \"Sign up Now\" on the right side of the page.     You will be asked to enter the access code listed below, as well as some personal information. Please follow the directions to create your username and password.     Your access code is: [email or web address]  Expires: 2018  4:31 PM     Your access code will  in 90 days. If you need help or a new code, please call your [facility] clinic or [phone].        Care EveryWhere ID     This is your Care EveryWhere ID. This could be used by other organizations to access your [facility] medical records  [id number]        Your Vitals Were     Pulse Temperature Pulse Oximetry             89 96.8  F (36  C) (Temporal) 97%          Blood Pressure from Last 3 Encounters:   18 131/85   18 126/86   18 126/70    Weight from Last 3 Encounters:   18 177 lb (80.3 kg)   18 185 lb (83.9 kg)   17 183 lb (83 kg)              We Performed the Following     BETA STREP GROUP A R/O CULTURE     RAPID STREP SCREEN          Today's Medication Changes          These changes are accurate as of 18 11:16 AM.  If you have any questions, ask your nurse or doctor.               Start taking these medicines.        Dose/Directions    benzonatate 200 MG capsule   Commonly known as:  TESSALON   Used for:  Cough   Started by:  Eloise Gaston PA-C        Dose:  200 mg   Take 1 capsule (200 mg) by mouth 3 times daily as needed for cough   Quantity:  21 capsule   Refills:  0            Where to get your medicines      These medications were sent to [facility] - " [place], [place] - [address] [facility]   [address] [facility] , [facility] [address]     Phone:  [phone]     benzonatate 200 MG capsule                Primary Care Provider Office Phone # Fax #    Alize Ragland NADJA [facility] [phone]       [address]   [facility] [place] [address]        Equal Access to Services     [facility]: Hadii aad ku hadasho Soomaali, waaxda luqadaha, qaybta kaalmada adeegyada, waxay idiin hayaan adeeg kharash la'aan . So [facility] [phone].    ATENCIÓN: Si habla español, tiene a varghese disposición servicios gratuitos de asistencia lingüística. Angi al [phone].    We comply with applicable federal civil rights laws and [place] laws. We do not discriminate on the basis of race, color, national origin, age, disability, sex, sexual orientation, or gender identity.            Thank you!     Thank you for choosing [facility]  for your care. Our goal is always to provide you with excellent care. Hearing back from our patients is one way we can continue to improve our services. Please take a few minutes to complete the written survey that you may receive in the mail after your visit with us. Thank you!             Your Updated Medication List - Protect others around you: Learn how to safely use, store and throw away your medicines at www.disposemymeds.org.          This list is accurate as of 5/1/18 11:16 AM.  Always use your most recent med list.                   Brand Name Dispense Instructions for use Diagnosis    benzonatate 200 MG capsule    TESSALON    21 capsule    Take 1 capsule (200 mg) by mouth 3 times daily as needed for cough    Cough       levothyroxine 75 MCG tablet    SYNTHROID/LEVOTHROID    90 tablet    Take 1 tablet (75 mcg) by mouth daily    Hypothyroidism, unspecified type       * sertraline 50 MG tablet    ZOLOFT    30 tablet    Take 1 tablet (50 mg) by mouth daily    Adjustment disorder with depressed mood       * sertraline 100 MG tablet     ZOLOFT    90 tablet    Take 1 tablet (100 mg) by mouth daily    Adjustment disorder with depressed mood       * Notice:  This list has 2 medication(s) that are the same as other medications prescribed for you. Read the directions carefully, and ask your doctor or other care provider to review them with you.

## 2018-05-01 NOTE — LETTER
[address] [address]        5/1/2018    Leah Lynn was seen 5/1/2018 at [facility]. Please excuse [place] from work today and tomorrow if needed  due to illness. [place] may return to work if no fever x 1 day and feeling better.      Cordially,        Eloise Gaston, PAC

## 2018-05-01 NOTE — PATIENT INSTRUCTIONS
* PHARYNGITIS (Sore Throat),REPORT PENDING    Pharyngitis (sore throat) is often due to a virus, but can also be caused by the  strep  bacteria. This is called  strep throat . Both viral and strep infection can cause throat pain that is worse when swallowing, aching all over with headache and fever. Both types of infections are contagious. They may be spread by coughing, kissing or touching others after touching your mouth or nose, so wash your hands often.  A test has been done to determine whether or not you have strep throat. If it is positive for strep infection you will usually need to take antibiotics. If the test is negative, you probably have a viral pharyngitis, and antibiotic treatment will not help you recover.  HOME CARE:      If your symptoms are severe, rest at home for the first 2-3 days. If you are told that your test is positive for strep, you should be off work and school for the first two days of antibiotic treatment. After that, you will no longer be as contagious.    Children: Use acetaminophen (Tylenol) for fever, fussiness or discomfort. In infants over six months of age, you may use ibuprofen (Children's Motrin) instead of Tylenol. [NOTE: If your child has chronic liver or kidney disease or ever had a stomach ulcer or GI bleeding, talk with your doctor before using these medicines.]   (Aspirin should never be used in anyone under 18 years of age who is ill with a fever. It may cause severe liver damage.)  Adults: You may use acetaminophen (Tylenol) 650-1000 mg every 6 hours or ibuprofen (Motrin, Advil) 600 mg every 6-8 hours with food to control pain, if you are able to take these medicines. [NOTE: If you have chronic liver or kidney disease or ever had a stomach ulcer or GI bleeding, talk with your doctor before using these medicines.]    Throat lozenges or sprays (Chloraseptic and others), or gargling with warm salt water will reduce throat pain. Dissolve 1/2 teaspoon of salt in 1 glass  of warm water. This is especially useful just before meals.     FOLLOW UP with your doctor as advised by our staff if you are not improving over the next week.  GET PROMPT MEDICAL ATTENTION  if any of the following occur:    Fever over 101 F (38.3 C) for more than three days    New or worsening ear pain, sinus pain or headache    Unable to swallow liquids or open your mouth wide due to throat pain    Trouble breathing    Muffled voice    New rash       [id number] [employer]. [address] [place] [address]. All rights reserved. This information is not intended as a substitute for professional medical care. Always follow your healthcare professional's instructions.  This information has been modified by your health care provider with permission from the publisher.    .............  Throat culture pending - will be notified of positive results only.    Please FOLLOW UP at primary care clinic if not improving, new symptoms, worse or this does not resolve.  [facility]  [phone]

## 2018-05-01 NOTE — NURSING NOTE
"Chief Complaint   Patient presents with     Cough     dry cough x2 days     Pharyngitis     s/t x 2 days       Initial /85 (BP Location: Right arm, Patient Position: Chair, Cuff Size: Adult Regular)  Pulse 89  Temp 96.8  F (36  C) (Temporal)  SpO2 97% Estimated body mass index is 30.86 kg/(m^2) as calculated from the following:    Height as of 4/13/18: 5' 3.5\" (1.613 m).    Weight as of 4/13/18: 177 lb (80.3 kg).  Medication Reconciliation: complete     Jessica Sundet      "

## 2018-05-03 LAB
BACTERIA SPEC CULT: NORMAL
SPECIMEN SOURCE: NORMAL

## 2018-05-11 PROBLEM — F33.1 MAJOR DEPRESSIVE DISORDER, RECURRENT EPISODE, MODERATE (H): Status: ACTIVE | Noted: 2018-04-20

## 2018-05-11 PROBLEM — F41.1 GAD (GENERALIZED ANXIETY DISORDER): Status: ACTIVE | Noted: 2018-05-11

## 2018-05-11 PROBLEM — F33.1 MAJOR DEPRESSIVE DISORDER, RECURRENT EPISODE, MODERATE (H): Status: ACTIVE | Noted: 2018-05-11

## 2018-05-11 PROBLEM — F41.1 GAD (GENERALIZED ANXIETY DISORDER): Status: ACTIVE | Noted: 2018-04-20

## 2018-05-31 ENCOUNTER — OFFICE VISIT (OUTPATIENT)
Dept: FAMILY MEDICINE | Facility: OTHER | Age: 28
End: 2018-05-31
Payer: COMMERCIAL

## 2018-05-31 VITALS
OXYGEN SATURATION: 99 % | TEMPERATURE: 98.8 F | DIASTOLIC BLOOD PRESSURE: 70 MMHG | BODY MASS INDEX: 30.51 KG/M2 | SYSTOLIC BLOOD PRESSURE: 124 MMHG | RESPIRATION RATE: 22 BRPM | WEIGHT: 175 LBS | HEART RATE: 88 BPM

## 2018-05-31 DIAGNOSIS — S29.012A UPPER BACK STRAIN, INITIAL ENCOUNTER: Primary | ICD-10-CM

## 2018-05-31 PROCEDURE — 99213 OFFICE O/P EST LOW 20 MIN: CPT | Performed by: PHYSICIAN ASSISTANT

## 2018-05-31 RX ORDER — METAXALONE 800 MG/1
800 TABLET ORAL 3 TIMES DAILY PRN
Qty: 30 TABLET | Refills: 1 | Status: SHIPPED | OUTPATIENT
Start: 2018-05-31 | End: 2018-08-02

## 2018-05-31 RX ORDER — NAPROXEN 500 MG/1
500 TABLET ORAL 2 TIMES DAILY PRN
Qty: 30 TABLET | Refills: 1 | Status: SHIPPED | OUTPATIENT
Start: 2018-05-31 | End: 2018-08-02

## 2018-05-31 ASSESSMENT — PAIN SCALES - GENERAL: PAINLEVEL: WORST PAIN (10)

## 2018-05-31 NOTE — MR AVS SNAPSHOT
After Visit Summary   5/31/2018    Leah Cox    MRN: [id number]           Patient Information     Date Of Birth          1990        Visit Information        Provider Department      5/31/2018 9:40 AM Freda Espitia PA-C [facility]        Today's Diagnoses     Upper back strain, initial encounter    -  1      Care Instructions    I will treat for inflammation with naproxen 2 -3 x daily with food and also a muscle relaxant to help with muscle tension. If still having pain/tightness I would have you see your chiropractor for evaluation or follow up in clinic.     Back Spasm (No Trauma)    Spasm of the back muscles can occur after a sudden forceful twisting or bending force (such as in a car accident), after a simple awkward movement, or after lifting something heavy with poor body positioning. In any case, muscle spasm adds to the pain. Sleeping in an awkward position or on a poor quality mattress can also cause this. Some people respond to emotional stress by tensing the muscles of their back.  Pain that continues may need further evaluation or other types of treatment such as physical therapy.  You don't always need X-rays for the initial evaluation of back pain, unless you had a physical injury such as from a car accident or fall. If your pain continues and doesn't respond to medical treatment, X-rays and other tests may then be done.   Home care    As soon as possible, start sitting or walking again to avoid problems from prolonged bed rest (muscle weakness, worsening back stiffness and pain, blood clots in the legs).    When in bed, try to find a position of comfort. A firm mattress is best. Try lying flat on your back with pillows under your knees. You can also try lying on your side with your knees bent up toward your chest and a pillow between your knees.    Avoid prolonged sitting, long car rides, or travel. This puts more stress on the lower back than standing or  walking.     During the first 24 to 72 hours after an injury or flare-up, apply an ice pack to the painful area for 20 minutes, then remove it for 20 minutes. Do this over a period of 60 to 90 minutes or several times a day. This will reduce swelling and pain. Always wrap ice packs in a thin towel.    You can start with ice, then switch to heat. Heat (hot shower, hot bath, or heating pad) reduces pain, and works well for muscle spasms. Apply heat to the painful area for 20 minutes, then remove it for 20 minutes. Do this over a period of 60 to 90 minutes or several times a day. Do not sleep on a heating pad as it can burn or damage skin.    Alternate ice and heat therapies.    Be aware of safe lifting methods and do not lift anything over 15 pounds until all the pain is gone.  Gentle stretching will help your back heal faster. Do this simple routine 2 to 3 times a day until your back is feeling better.    Lie on your back with your knees bent and both feet on the ground    Slowly raise your left knee to your chest as you flatten your lower back against the floor. Hold for 20 to 30 seconds.    Relax and repeat the exercise with your right knee.    Do 2 to 3 of these exercises for each leg.    Repeat, hugging both knees to your chest at the same time.    Do not bounce, but use a gentle pull.  Medicines  Talk to your doctor before using medicine, especially if you have other medical problems or are taking other medicines.  You may use acetaminophen or ibuprofen to control pain, unless your healthcare provider prescribed another pain medicine. If you have a chronic condition such as diabetes, liver or kidney disease, stomach ulcer, or gastrointestinal bleeding, or are taking blood thinners, talk with your healthcare provider before taking any medicines.  Be careful if you are given prescription pain medicine, narcotics, or medicine for muscle spasm. They can cause drowsiness, affect your coordination, reflexes, or  judgment. Do not drive or operate heavy machinery when taking these medicines. Take pain medicine only as prescribed by your healthcare provider.  Follow-up care  Follow up with your doctor, or as advised. Physical therapy or further tests may be needed.  If X-rays were taken, they may be reviewed by a radiologist. You will be notified of any new findings that may affect your care.  Call 911  Call 911 if any of these occur:    Trouble breathing    Confusion    Drowsiness or trouble awakening    Fainting or loss of consciousness    Rapid or very slow heart rate    Loss of bowel or bladder control  When to seek medical advice  Call your healthcare provider right away if any of these occur:    Pain becomes worse or spreads to your legs    Weakness or numbness in one or both legs    Numbness in the groin or genital area    Fever of 100.4 F (38 C) or higher, or as directed by your healthcare provider    Burning or pain when passing urine  Date Last Reviewed: 6/1/2016 2000-2017 [employer]. [address] [place] [address]. All rights reserved. This information is not intended as a substitute for professional medical care. Always follow your healthcare professional's instructions.                Follow-ups after your visit        Follow-up notes from your care team     Return if symptoms worsen or fail to improve.      Your next 10 appointments already scheduled     Jun 21, 2018  4:15 PM CDT   New Sleep Patient with Ramses Chong MD   [facility] ([facility])    [address] [place] [address] [phone]              Who to contact     If you have questions or need follow up information about today's clinic visit or your schedule please contact [facility] directly at [phone].  Normal or non-critical lab and imaging results will be communicated to you by MyChart, letter or phone within 4 business days after the  "clinic has received the results. If you do not hear from us within 7 days, please contact the clinic through [employer] or phone. If you have a critical or abnormal lab result, we will notify you by phone as soon as possible.  Submit refill requests through [employer] or call your pharmacy and they will forward the refill request to us. Please allow 3 business days for your refill to be completed.          Additional Information About Your Visit        [employer] Information     [employer] lets you send messages to your doctor, view your test results, renew your prescriptions, schedule appointments and more. To sign up, go to [email or web address] . Click on \"Log in\" on the left side of the screen, which will take you to the Welcome page. Then click on \"Sign up Now\" on the right side of the page.     You will be asked to enter the access code listed below, as well as some personal information. Please follow the directions to create your username and password.     Your access code is: [email or web address]  Expires: 2018 12:03 PM     Your access code will  in 90 days. If you need help or a new code, please call your [facility] clinic or [phone].        Care EveryWhere ID     This is your Care EveryWhere ID. This could be used by other organizations to access your [facility] medical records  [id number]        Your Vitals Were     Pulse Temperature Respirations Last Period Pulse Oximetry Breastfeeding?    88 98.8  F (37.1  C) (Temporal) 22 2018 (Approximate) 99% No    BMI (Body Mass Index)                   30.51 kg/m2            Blood Pressure from Last 3 Encounters:   18 124/70   18 131/85   18 126/86    Weight from Last 3 Encounters:   18 175 lb (79.4 kg)   18 177 lb (80.3 kg)   18 185 lb (83.9 kg)              Today, you had the following     No orders found for display         Today's Medication Changes          These changes are accurate as of 18  9:52 AM.  If you have any " questions, ask your nurse or doctor.               Start taking these medicines.        Dose/Directions    metaxalone 800 MG tablet   Commonly known as:  SKELAXIN   Used for:  Upper back strain, initial encounter   Started by:  Freda Espitia PA-C        Dose:  800 mg   Take 1 tablet (800 mg) by mouth 3 times daily as needed for moderate pain   Quantity:  30 tablet   Refills:  1       naproxen 500 MG tablet   Commonly known as:  NAPROSYN   Used for:  Upper back strain, initial encounter   Started by:  Freda Espitia PA-C        Dose:  500 mg   Take 1 tablet (500 mg) by mouth 2 times daily as needed for moderate pain   Quantity:  30 tablet   Refills:  1            Where to get your medicines      These medications were sent to [facility] [place], [place]  [address]  [address] [facility] [address]     Phone:  [phone]     metaxalone 800 MG tablet    naproxen 500 MG tablet                Primary Care Provider Office Phone # Fax #    Alize Sofía Ragland, APRN [facility] [phone]       [address]   [facility] [place] [address]        Equal Access to Services     [facility]: Hadii ketan arora hadasho Sotcali, waaxda luqadaha, qaybta kaalmada adeyarely, maday finley. So [facility] [phone].    ATENCIÓN: Si habla español, tiene a varghese disposición servicios gratuitos de asistencia lingüística. [facility] [phone].    We comply with applicable federal civil rights laws and [place] laws. We do not discriminate on the basis of race, color, national origin, age, disability, sex, sexual orientation, or gender identity.            Thank you!     Thank you for choosing [facility]  for your care. Our goal is always to provide you with excellent care. Hearing back from our patients is one way we can continue to improve our services. Please take a few minutes to complete the written survey that you may receive in the mail after your visit with us.  Thank you!             Your Updated Medication List - Protect others around you: Learn how to safely use, store and throw away your medicines at www.disposemymeds.org.          This list is accurate as of 5/31/18  9:52 AM.  Always use your most recent med list.                   Brand Name Dispense Instructions for use Diagnosis    benzonatate 200 MG capsule    TESSALON    21 capsule    Take 1 capsule (200 mg) by mouth 3 times daily as needed for cough    Cough       levothyroxine 75 MCG tablet    SYNTHROID/LEVOTHROID    90 tablet    Take 1 tablet (75 mcg) by mouth daily    Hypothyroidism, unspecified type       metaxalone 800 MG tablet    SKELAXIN    30 tablet    Take 1 tablet (800 mg) by mouth 3 times daily as needed for moderate pain    Upper back strain, initial encounter       naproxen 500 MG tablet    NAPROSYN    30 tablet    Take 1 tablet (500 mg) by mouth 2 times daily as needed for moderate pain    Upper back strain, initial encounter       sertraline 100 MG tablet    ZOLOFT    90 tablet    Take 1 tablet (100 mg) by mouth daily    Adjustment disorder with depressed mood

## 2018-05-31 NOTE — LETTER
[facility]  [address] [facility] [address]  Phone: [phone]    May 31, 2018        Leah Cox  [address] [facility] [address]          To whom it may concern:    RE: Leah Cox    Patient was seen and treated today at our clinic and missed work.  She has a back injury and will also need to be out of work tomorrow.     Please contact me for questions or concerns.      Sincerely,        Freda Espitia PA-C

## 2018-05-31 NOTE — PATIENT INSTRUCTIONS
I will treat for inflammation with naproxen 2 -3 x daily with food and also a muscle relaxant to help with muscle tension. If still having pain/tightness I would have you see your chiropractor for evaluation or follow up in clinic.     Back Spasm (No Trauma)    Spasm of the back muscles can occur after a sudden forceful twisting or bending force (such as in a car accident), after a simple awkward movement, or after lifting something heavy with poor body positioning. In any case, muscle spasm adds to the pain. Sleeping in an awkward position or on a poor quality mattress can also cause this. Some people respond to emotional stress by tensing the muscles of their back.  Pain that continues may need further evaluation or other types of treatment such as physical therapy.  You don't always need X-rays for the initial evaluation of back pain, unless you had a physical injury such as from a car accident or fall. If your pain continues and doesn't respond to medical treatment, X-rays and other tests may then be done.   Home care    As soon as possible, start sitting or walking again to avoid problems from prolonged bed rest (muscle weakness, worsening back stiffness and pain, blood clots in the legs).    When in bed, try to find a position of comfort. A firm mattress is best. Try lying flat on your back with pillows under your knees. You can also try lying on your side with your knees bent up toward your chest and a pillow between your knees.    Avoid prolonged sitting, long car rides, or travel. This puts more stress on the lower back than standing or walking.     During the first 24 to 72 hours after an injury or flare-up, apply an ice pack to the painful area for 20 minutes, then remove it for 20 minutes. Do this over a period of 60 to 90 minutes or several times a day. This will reduce swelling and pain. Always wrap ice packs in a thin towel.    You can start with ice, then switch to heat. Heat (hot shower, hot bath,  or heating pad) reduces pain, and works well for muscle spasms. Apply heat to the painful area for 20 minutes, then remove it for 20 minutes. Do this over a period of 60 to 90 minutes or several times a day. Do not sleep on a heating pad as it can burn or damage skin.    Alternate ice and heat therapies.    Be aware of safe lifting methods and do not lift anything over 15 pounds until all the pain is gone.  Gentle stretching will help your back heal faster. Do this simple routine 2 to 3 times a day until your back is feeling better.    Lie on your back with your knees bent and both feet on the ground    Slowly raise your left knee to your chest as you flatten your lower back against the floor. Hold for 20 to 30 seconds.    Relax and repeat the exercise with your right knee.    Do 2 to 3 of these exercises for each leg.    Repeat, hugging both knees to your chest at the same time.    Do not bounce, but use a gentle pull.  Medicines  Talk to your doctor before using medicine, especially if you have other medical problems or are taking other medicines.  You may use acetaminophen or ibuprofen to control pain, unless your healthcare provider prescribed another pain medicine. If you have a chronic condition such as diabetes, liver or kidney disease, stomach ulcer, or gastrointestinal bleeding, or are taking blood thinners, talk with your healthcare provider before taking any medicines.  Be careful if you are given prescription pain medicine, narcotics, or medicine for muscle spasm. They can cause drowsiness, affect your coordination, reflexes, or judgment. Do not drive or operate heavy machinery when taking these medicines. Take pain medicine only as prescribed by your healthcare provider.  Follow-up care  Follow up with your doctor, or as advised. Physical therapy or further tests may be needed.  If X-rays were taken, they may be reviewed by a radiologist. You will be notified of any new findings that may affect your  care.  Call 911  Call 911 if any of these occur:    Trouble breathing    Confusion    Drowsiness or trouble awakening    Fainting or loss of consciousness    Rapid or very slow heart rate    Loss of bowel or bladder control  When to seek medical advice  Call your healthcare provider right away if any of these occur:    Pain becomes worse or spreads to your legs    Weakness or numbness in one or both legs    Numbness in the groin or genital area    Fever of 100.4 F (38 C) or higher, or as directed by your healthcare provider    Burning or pain when passing urine  Date Last Reviewed: 6/1/2016 2000-2017 [employer]. [address] [place] [address]. All rights reserved. This information is not intended as a substitute for professional medical care. Always follow your healthcare professional's instructions.

## 2018-05-31 NOTE — PROGRESS NOTES
SUBJECTIVE:   Leah Cox is a 27 year old female who presents to clinic today for the following health issues:      Back Pain       Duration: this morning         Specific cause: unsure woke up with pain    Description:   Location of pain: middle of back right  Character of pain: sharp, burning and constant  Pain radiation:radiates into the right buttocks  New numbness or weakness in legs, not attributed to pain:  no     Intensity: Currently 10/10    History:   Pain interferes with job: YES, will need note for work  History of back problems: nerve  Any previous MRI or X-rays: None  Sees a specialist for back pain:  No  Therapies tried without relief: none    Alleviating factors:   Improved by: none      Precipitating factors:  Worsened by: Lifting, Bending, Standing, Sitting, Lying Flat, Walking, Coughing and driving    Functional and Psychosocial Screen (Hi STarT Back):      Not performed today          Accompanying Signs & Symptoms:  Risk of Fracture:  None  Risk of Cauda Equina:  None  Risk of Infection:  None  Risk of Cancer:  None  Risk of Ankylosing Spondylitis:  Onset at age <35, male, AND morning back stiffness. no          Patient denies any specific injury to the back. She has a history of low back pain but has not had a flare of that for some time. She reports that she woke this morning with severe stiffness and pain to the right thoracic spine. She has not had any skin rash, has not had any cough or chest pain. SHe has not had any other associated symptoms.   -------------------------------------    Problem list and histories reviewed & adjusted, as indicated.  Additional history: as documented    BP Readings from Last 3 Encounters:   05/31/18 124/70   05/01/18 131/85   04/13/18 126/86    Wt Readings from Last 3 Encounters:   05/31/18 175 lb (79.4 kg)   04/13/18 177 lb (80.3 kg)   01/26/18 185 lb (83.9 kg)         Reviewed and updated as needed this visit by clinical staff       Reviewed and  updated as needed this visit by Provider         ROS:  Constitutional, HEENT, cardiovascular, pulmonary, gi and gu systems are negative, except as otherwise noted.    OBJECTIVE:     /70  Pulse 88  Temp 98.8  F (37.1  C) (Temporal)  Resp 22  Wt 175 lb (79.4 kg)  LMP 05/13/2018 (Approximate)  SpO2 99%  Breastfeeding? No  BMI 30.51 kg/m2  Body mass index is 30.51 kg/(m^2).  GENERAL: alert and mild distress  RESP: lungs clear to auscultation - no rales, rhonchi or wheezes  CV: regular rates and rhythm, no murmur, click or rub, peripheral pulses strong and no peripheral edema  MS: there is pain and stiffness to the right thoracic spine with palpable muscle spasm. ROM is limited in all directions due to stiffness and pain.   SKIN: no suspicious lesions or rashes    Diagnostic Test Results:  none     ASSESSMENT/PLAN:       ICD-10-CM    1. Upper back strain, initial encounter S29.012A metaxalone (SKELAXIN) 800 MG tablet     naproxen (NAPROSYN) 500 MG tablet       I will treat back muscle spasm with a muscle relaxant and naproxen for inflammation. I will have her use ice and heat and if desired see her chiropractor. If symptoms are persisting or worsening I would have her follow up in clinic for further evaluation and imaging.   See Patient Instructions    Freda Espitia PA-C  [facility]

## 2018-06-04 ENCOUNTER — OFFICE VISIT (OUTPATIENT)
Dept: FAMILY MEDICINE | Facility: OTHER | Age: 28
End: 2018-06-04
Payer: COMMERCIAL

## 2018-06-04 VITALS
TEMPERATURE: 96.3 F | WEIGHT: 174.9 LBS | SYSTOLIC BLOOD PRESSURE: 116 MMHG | BODY MASS INDEX: 30.5 KG/M2 | OXYGEN SATURATION: 97 % | DIASTOLIC BLOOD PRESSURE: 70 MMHG | RESPIRATION RATE: 16 BRPM | HEART RATE: 80 BPM

## 2018-06-04 DIAGNOSIS — M54.41 BILATERAL LOW BACK PAIN WITH RIGHT-SIDED SCIATICA, UNSPECIFIED CHRONICITY: Primary | ICD-10-CM

## 2018-06-04 PROCEDURE — 99213 OFFICE O/P EST LOW 20 MIN: CPT | Performed by: PHYSICIAN ASSISTANT

## 2018-06-04 ASSESSMENT — PAIN SCALES - GENERAL: PAINLEVEL: MODERATE PAIN (4)

## 2018-06-04 NOTE — NURSING NOTE
Health Maintenance Due   Topic Date Due     HIV SCREEN (SYSTEM ASSIGNED)  10/06/2007     Josette BUTTS LPN

## 2018-06-04 NOTE — MR AVS SNAPSHOT
"              After Visit Summary   6/4/2018    Leah Cox    MRN: [id number]           Patient Information     Date Of Birth          1990        Visit Information        Provider Department      6/4/2018 8:00 AM Fitz Vallecillo PA-C [facility]        Today's Diagnoses     Bilateral low back pain with right-sided sciatica, unspecified chronicity    -  1       Follow-ups after your visit        Your next 10 appointments already scheduled     Jun 21, 2018  4:15 PM CDT   New Sleep Patient with Ramses Chong MD   [facility]    [address] [place] [id number] [phone]              Who to contact     If you have questions or need follow up information about today's clinic visit or your schedule please contact [facility] directly at [phone].  Normal or non-critical lab and imaging results will be communicated to you by MyChart, letter or phone within 4 business days after the clinic has received the results. If you do not hear from us within 7 days, please contact the clinic through MyChart or phone. If you have a critical or abnormal lab result, we will notify you by phone as soon as possible.  Submit refill requests through [employer] or call your pharmacy and they will forward the refill request to us. Please allow 3 business days for your refill to be completed.          Additional Information About Your Visit        MyChart Information     [employer] lets you send messages to your doctor, view your test results, renew your prescriptions, schedule appointments and more. To sign up, go to [email or web address] . Click on \"Log in\" on the left side of the screen, which will take you to the Welcome page. Then click on \"Sign up Now\" on the right side of the page.     You will be asked to enter the access code listed below, as well as some personal information. Please follow the directions to create " your username and password.     Your access code is: [email or web address]  Expires: 2018 12:03 PM     Your access code will  in 90 days. If you need help or a new code, please call your [facility] clinic or [phone].        Care EveryWhere ID     This is your Care EveryWhere ID. This could be used by other organizations to access your [facility] medical records  [id number]        Your Vitals Were     Pulse Temperature Respirations Last Period Pulse Oximetry BMI (Body Mass Index)    80 96.3  F (35.7  C) (Oral) 16 2018 (Approximate) 97% 30.5 kg/m2       Blood Pressure from Last 3 Encounters:   18 116/70   18 124/70   18 131/85    Weight from Last 3 Encounters:   18 174 lb 14.4 oz (79.3 kg)   18 175 lb (79.4 kg)   18 177 lb (80.3 kg)              Today, you had the following     No orders found for display       Primary Care Provider Office Phone # Fax #    NADJA Pierce [facility] [phone] [address] [place] [address]        Equal Access to Services     CONCHIS SALCIDO : Hadii ketan ku hadasho Soomaali, waaxda luqadaha, qaybta kaalmada adeegyada, waxay mallory good . So [facility] [phone].    ATENCIÓN: Si habla español, tiene a varghese disposición servicios gratuitos de asistencia lingüística. Lljessika al [phone].    We comply with applicable federal civil rights laws and [place] laws. We do not discriminate on the basis of race, color, national origin, age, disability, sex, sexual orientation, or gender identity.            Thank you!     Thank you for choosing [facility]  for your care. Our goal is always to provide you with excellent care. Hearing back from our patients is one way we can continue to improve our services. Please take a few minutes to complete the written survey that you may receive in the mail after your visit with us. Thank you!             Your Updated Medication List - Protect others around  you: Learn how to safely use, store and throw away your medicines at www.disposemymeds.org.          This list is accurate as of 6/4/18  8:12 AM.  Always use your most recent med list.                   Brand Name Dispense Instructions for use Diagnosis    benzonatate 200 MG capsule    TESSALON    21 capsule    Take 1 capsule (200 mg) by mouth 3 times daily as needed for cough    Cough       levothyroxine 75 MCG tablet    SYNTHROID/LEVOTHROID    90 tablet    Take 1 tablet (75 mcg) by mouth daily    Hypothyroidism, unspecified type       metaxalone 800 MG tablet    SKELAXIN    30 tablet    Take 1 tablet (800 mg) by mouth 3 times daily as needed for moderate pain    Upper back strain, initial encounter       naproxen 500 MG tablet    NAPROSYN    30 tablet    Take 1 tablet (500 mg) by mouth 2 times daily as needed for moderate pain    Upper back strain, initial encounter       sertraline 100 MG tablet    ZOLOFT    90 tablet    Take 1 tablet (100 mg) by mouth daily    Adjustment disorder with depressed mood

## 2018-06-04 NOTE — PROGRESS NOTES
SUBJECTIVE:   Leah Cox is a 27 year old female who presents to clinic today for the following health issues:      Concern - needs a return to work note  Onset:   Description:   Not for work    Intensity: mild for back    Progression of Symptoms:  Improving for the back    Accompanying Signs & Symptoms:  right hip pain    Previous history of similar problem:   YES    Precipitating factors:   Worsened by: lying, sitting, walking, standing for the back    Alleviating factors:  Improved by: nothing    Therapies Tried and outcome: ice pack, muscle relaxer, physical therapy, chiropractor, no relief for the back    Patient is a pleasant 27 year old female who is in for follow up of back pain. She was last seen on 05/31 at which point she reported 10/10 pain in right back radiating to buttocks. She was given instructions to rest and use ice, muscle relaxant and anti-inflammatory as needed. Today she reports that the pain has all but improved. There are twinges of pain, which sound to be baseline, with twisting motions. Denies radiation, numbness, loss of strength, changes in bowel/bladder, trouble breathing or chest pain. Has muscle relaxants to use if needed.     Problem list and histories reviewed & adjusted, as indicated.  Additional history: as documented    Patient Active Problem List   Diagnosis     Irregular periods     Pelvic pain in female     Carpal tunnel syndrome of right wrist     Adjustment disorder with depressed mood     Excessive or frequent menstruation     Ganglion cyst of dorsum of right wrist     Metacarpal boss     Segmental dysfunction of sacral region     Bilateral low back pain with right-sided sciatica     Segmental dysfunction of lumbar region     Segmental dysfunction of thoracic region     S/P LEEP of cervix     Tobacco use disorder     Major depressive disorder, recurrent episode, moderate (H)     RENAY (generalized anxiety disorder)     Past Surgical History:   Procedure Laterality  Date     LEEP TX, CERVICAL  01/23/2013    SUZAN 2 with free margins - Care Everywhere.     RELEASE CARPAL TUNNEL Right 10/5/2016    Procedure: RELEASE CARPAL TUNNEL;  Surgeon: Christian Sebastian MD;  Location:  OR       Social History   Substance Use Topics     Smoking status: Current Every Day Smoker     Packs/day: 0.25     Smokeless tobacco: Never Used     Alcohol use 0.0 oz/week     0 Standard drinks or equivalent per week      Comment: occ.     Family History   Problem Relation Age of Onset     Hypertension Father          Current Outpatient Prescriptions   Medication Sig Dispense Refill     levothyroxine (SYNTHROID/LEVOTHROID) 75 MCG tablet Take 1 tablet (75 mcg) by mouth daily 90 tablet 3     metaxalone (SKELAXIN) 800 MG tablet Take 1 tablet (800 mg) by mouth 3 times daily as needed for moderate pain 30 tablet 1     naproxen (NAPROSYN) 500 MG tablet Take 1 tablet (500 mg) by mouth 2 times daily as needed for moderate pain 30 tablet 1     sertraline (ZOLOFT) 100 MG tablet Take 1 tablet (100 mg) by mouth daily 90 tablet 0     benzonatate (TESSALON) 200 MG capsule Take 1 capsule (200 mg) by mouth 3 times daily as needed for cough (Patient not taking: Reported on 6/4/2018) 21 capsule 0     Allergies   Allergen Reactions     Cyclobenzaprine      Started giving her dreams       Reviewed and updated as needed this visit by clinical staff  Tobacco  Allergies  Meds  Med Hx  Surg Hx  Fam Hx  Soc Hx      Reviewed and updated as needed this visit by Provider         ROS:  Constitutional, HEENT, cardiovascular, pulmonary, gi and gu systems are negative, except as otherwise noted.    OBJECTIVE:     /70 (BP Location: Left arm, Patient Position: Chair, Cuff Size: Adult Large)  Pulse 80  Temp 96.3  F (35.7  C) (Oral)  Resp 16  Wt 174 lb 14.4 oz (79.3 kg)  LMP 05/13/2018 (Approximate)  SpO2 97%  BMI 30.5 kg/m2  Body mass index is 30.5 kg/(m^2).  GENERAL: healthy, alert and no distress  RESP: lungs clear to  auscultation - no rales, rhonchi or wheezes  CV: regular rate and rhythm, normal S1 S2, no S3 or S4, no murmur, click or rub, no peripheral edema and peripheral pulses strong  ABDOMEN: soft, nontender, no hepatosplenomegaly, no masses and bowel sounds normal  MS: no gross musculoskeletal defects noted, no edema, normal gait.  NEURO: Normal strength and tone, mentation intact and speech normal  PSYCH: mentation appears normal, affect normal/bright    Diagnostic Test Results:  none     ASSESSMENT/PLAN:   1. Bilateral low back pain with right-sided sciatica, unspecified chronicity  Patient's pain has resolved, she feels that she has returned to baseline. Discussed returning to work without restrictions, but paying attention to pain levels. If pain returns she will consider PT pool exercises.       Follow up with clinic as needed or sooner if conditions change, worsen or fail to improve as expected.      Fitz Vallecillo PA-C  [facility]

## 2018-06-04 NOTE — LETTER
June 4, 2018      Leah Cox  [address] [place] [id number]        To Whom It May Concern:    Leah Cox was seen in our clinic 06/04/2018. She may return to work without restrictions.     Sincerely,        Fitz Vallecillo PA-C

## 2018-06-20 NOTE — PROGRESS NOTES
Sleep Consultation:    Date on this visit: 6/21/2018    Leah Cox is sent by Alize Ragland for a sleep consultation regarding snoring.    Primary Physician: Alize Ragland     Leah Cox reports nightly snoring, gasping and poor quality of sleep for years.     Virginia goes to sleep at 11:00 PM during the week. She wakes up at 4:00 AM with an alarm. She falls asleep in 90 minutes.  Virginia has difficulty falling asleep.  She wakes up >8 times a night for 15 minutes before falling back to sleep.  Virginia wakes up to go to the bathroom and uncertain reasons.  On weekends, Virginia goes to sleep at 12:30 AM.  She wakes up at 7:00 AM without an alarm. She falls asleep in 60 minutes.  Patient gets an average of 5 to 6 hours of sleep per night.     Patient does use electronics in bed and watch TV in bed.     Virginia does not do shift work.  She works day shifts.      Virginia does snore every night. Patient does not have a regular bed partner. There is report of snoring and gasping.  She does not have witnessed apneas.   Patient sleeps on her back, side, stomach and tosses and turns a lot. She has occasional morning dry mouth and restless legs,. Virginia denies any bruxism, sleep walking, sleep talking, dream enactment, sleep paralysis, cataplexy and hypnogogic/hypnopompic hallucinations.    She denies sleep walking, sleep talking, enuresis and sleep terrors as a child.  Virginia denies difficulty breathing through her nose, claustrophobia, reflux at night, heartburn and depression.      Virginia has gained 0-5 pounds in the last year.  Patient describes themself as a night person.  Patient's [place] Sleepiness score 16/24 consistent with excessive  daytime sleepiness.      Virginia naps 1-2 times per week for 20-30 minutes, feels refreshed after naps. She takes no inadvertant naps.  She admits dozing and falling asleep while driving for an hour or more.   Patient was counseled on the  importance of driving while alert, to pull over if drowsy, or nap before getting into the vehicle if sleepy.  She uses 1 to 2 energy drinks/week. Last caffeine intake is usually before noon.    Allergies:    Allergies   Allergen Reactions     Cyclobenzaprine      Started giving her dreams       Medications:    Current Outpatient Prescriptions   Medication Sig Dispense Refill     benzonatate (TESSALON) 200 MG capsule Take 1 capsule (200 mg) by mouth 3 times daily as needed for cough (Patient not taking: Reported on 6/4/2018) 21 capsule 0     levothyroxine (SYNTHROID/LEVOTHROID) 75 MCG tablet Take 1 tablet (75 mcg) by mouth daily 90 tablet 3     metaxalone (SKELAXIN) 800 MG tablet Take 1 tablet (800 mg) by mouth 3 times daily as needed for moderate pain 30 tablet 1     naproxen (NAPROSYN) 500 MG tablet Take 1 tablet (500 mg) by mouth 2 times daily as needed for moderate pain 30 tablet 1     sertraline (ZOLOFT) 100 MG tablet Take 1 tablet (100 mg) by mouth daily 90 tablet 0       Problem List:  Patient Active Problem List    Diagnosis Date Noted     Tobacco use disorder 05/01/2018     Priority: Medium     Major depressive disorder, recurrent episode, moderate (H) 04/20/2018     Priority: Medium     RENAY (generalized anxiety disorder) 04/20/2018     Priority: Medium     Segmental dysfunction of sacral region 01/31/2018     Priority: Medium     Bilateral low back pain with right-sided sciatica 01/31/2018     Priority: Medium     Segmental dysfunction of lumbar region 01/31/2018     Priority: Medium     Segmental dysfunction of thoracic region 01/31/2018     Priority: Medium     Ganglion cyst of dorsum of right wrist 07/24/2017     Priority: Medium     Metacarpal boss 07/24/2017     Priority: Medium     Excessive or frequent menstruation 03/14/2017     Priority: Medium     Adjustment disorder with depressed mood 03/03/2017     Priority: Medium     Carpal tunnel syndrome of right wrist 10/18/2016     Priority: Medium      Irregular periods 12/08/2015     Priority: Medium     Pelvic pain in female 12/08/2015     Priority: Medium     S/P LEEP of cervix 01/23/2013     Priority: Medium     5/15/09 NIL pap  6/30/11 ASCUS pap, + HR HPV.   8/30/11 [place] bx: SUZAN 2, ECC SUZAN 1  3/27/12 [place] bx: SUZAN 1, ECC: neg.  Pap: NIL, + HR HPV  9/26/12 ASCUS pap, + HR HPV  11/7/12 [place] bx: SUZAN 2, ECC: SUZAN 1.   1/23/13 LEEP: SUZAN 2 with free margins.   6/25/13 NIL pap     ** above information is from Care Everywhere  **  12/8/15 NIL pap, neg HPV. Plan: cotest in 1 yr, per Staff message from Dr. Farnsworth dated 2/5/18.  4/13/18 NIL pap, neg HR HPV.Plan: cotest in 3 years.          Past Medical/Surgical History:  Past Medical History:   Diagnosis Date     ASCUS with positive high risk HPV cervical 09/26/2012     H/O colposcopy with cervical biopsy 11/07/2012    SUZAN 2-3     S/P LEEP of cervix 01/23/2013    SUZAN 2 with free margins     Past Surgical History:   Procedure Laterality Date     LEEP TX, CERVICAL  01/23/2013    SUZAN 2 with free margins - Care Everywhere.     RELEASE CARPAL TUNNEL Right 10/5/2016    Procedure: RELEASE CARPAL TUNNEL;  Surgeon: Christian Sebastian MD;  Location:  OR       Social History:  Social History     Social History     Marital status: Single     Spouse name: N/A     Number of children: N/A     Years of education: N/A     Occupational History     Not on file.     Social History Main Topics     Smoking status: Current Every Day Smoker     Packs/day: 0.25     Smokeless tobacco: Never Used     Alcohol use 0.0 oz/week     0 Standard drinks or equivalent per week      Comment: occ.     Drug use: No     Sexual activity: Yes     Partners: Male     Other Topics Concern     Not on file     Social History Narrative       Family History:  Family History   Problem Relation Age of Onset     Hypertension Father        Review of Systems:  A complete review of systems reviewed by me is negative with the exeption of what has been mentioned in the  history of present illness.  CONSTITUTIONAL: NEGATIVE for weight gain/loss, fever, chills, sweats or night sweats, drug allergies.  EYES: NEGATIVE for changes in vision, blind spots, double vision.  ENT: NEGATIVE for ear pain, sore throat, sinus pain, post-nasal drip, runny nose, bloody nose  CARDIAC: NEGATIVE for fast heartbeats or fluttering in chest, chest pain or pressure, breathlessness when lying flat, swollen legs or swollen feet.  NEUROLOGIC: NEGATIVE headaches, weakness or numbness in the arms or legs.  DERMATOLOGIC: NEGATIVE for rashes, new moles or change in mole(s)  PULMONARY: NEGATIVE SOB at rest, SOB with activity, dry cough, productive cough, coughing up blood, wheezing or whistling when breathing.    GASTROINTESTINAL: NEGATIVE for nausea or vomitting, loose or watery stools, fat or grease in stools, constipation, abdominal pain, bowel movements black in color or blood noted.  GENITOURINARY: NEGATIVE for pain during urination, blood in urine, urinating more frequently than usual, irregular menstrual periods.  MUSCULOSKELETAL: NEGATIVE for muscle pain, bone or joint pain, swollen joints.  ENDOCRINE: NEGATIVE for increased thirst or urination, diabetes.  LYMPHATIC: NEGATIVE for swollen lymph nodes, lumps or bumps in the breasts or nipple discharge.    Physical Examination:  Vitals: There were no vitals taken for this visit.  BMI= There is no height or weight on file to calculate BMI.         No flowsheet data found.    GENERAL APPEARANCE: healthy, alert, no distress and over weight  EYES: Eyes grossly normal to inspection, PERRL and conjunctivae and sclerae normal  HENT: ear canals and TM's normal, nose and mouth without ulcers or lesions, oropharynx crowded and tongue base enlarged  NECK: no adenopathy, no asymmetry, masses, or scars and thyroid normal to palpation  RESP: lungs clear to auscultation - no rales, rhonchi or wheezes  NEURO: Normal strength and tone, mentation intact and speech  normal  PSYCH: mentation appears normal and affect normal/bright  Mallampati Class: III.  Tonsillar Stage: 0  surgically removed.    Impression/Plan:    The patient with the history of snoring, EDS and RLS likely has JOURDAN and also has insomnia.  Literature provided regarding sleep apnea, restless leg syndrome and sleep hygiene.      She will follow up with me in approximately two weeks after her sleep study has been competed to review the results and discuss plan of care.       Polysomnography reviewed.  Obstructive sleep apnea reviewed.  Complications of untreated sleep apnea were reviewed.    Ramses Chong MD      CC: Alize Ragland

## 2018-06-21 ENCOUNTER — OFFICE VISIT (OUTPATIENT)
Dept: SLEEP MEDICINE | Facility: CLINIC | Age: 28
End: 2018-06-21
Attending: NURSE PRACTITIONER
Payer: COMMERCIAL

## 2018-06-21 VITALS
WEIGHT: 179 LBS | OXYGEN SATURATION: 99 % | HEART RATE: 99 BPM | SYSTOLIC BLOOD PRESSURE: 120 MMHG | HEIGHT: 64 IN | RESPIRATION RATE: 20 BRPM | BODY MASS INDEX: 30.56 KG/M2 | DIASTOLIC BLOOD PRESSURE: 76 MMHG

## 2018-06-21 DIAGNOSIS — G47.00 INSOMNIA, UNSPECIFIED TYPE: ICD-10-CM

## 2018-06-21 DIAGNOSIS — G47.19 EXCESSIVE DAYTIME SLEEPINESS: Primary | ICD-10-CM

## 2018-06-21 DIAGNOSIS — R06.83 SNORING: ICD-10-CM

## 2018-06-21 DIAGNOSIS — G25.81 RESTLESS LEGS SYNDROME (RLS): ICD-10-CM

## 2018-06-21 PROCEDURE — 99204 OFFICE O/P NEW MOD 45 MIN: CPT | Performed by: OTOLARYNGOLOGY

## 2018-06-21 RX ORDER — ZOLPIDEM TARTRATE 5 MG/1
TABLET ORAL
Qty: 1 TABLET | Refills: 0 | Status: SHIPPED | OUTPATIENT
Start: 2018-06-21 | End: 2018-08-02

## 2018-06-21 NOTE — MR AVS SNAPSHOT
"              After Visit Summary   6/21/2018    Leah Cox    MRN: [id number]           Patient Information     Date Of Birth          1990        Visit Information        Provider Department      6/21/2018 4:15 PM Ramses Chong MD [facility]        Today's Diagnoses     Excessive daytime sleepiness    -  1    Snoring        Restless legs syndrome (RLS)        Insomnia, unspecified type           Follow-ups after your visit        Future tests that were ordered for you today     Open Future Orders        Priority Expected Expires Ordered    Comprehensive Sleep Study Routine  12/18/2018 6/21/2018            Who to contact     If you have questions or need follow up information about today's clinic visit or your schedule please contact [facility] directly at [phone].  Normal or non-critical lab and imaging results will be communicated to you by MyChart, letter or phone within 4 business days after the clinic has received the results. If you do not hear from us within 7 days, please contact the clinic through MyChart or phone. If you have a critical or abnormal lab result, we will notify you by phone as soon as possible.  Submit refill requests through [employer] or call your pharmacy and they will forward the refill request to us. Please allow 3 business days for your refill to be completed.          Additional Information About Your Visit        Care EveryWhere ID     This is your Care EveryWhere ID. This could be used by other organizations to access your [facility] medical records  [id number]        Your Vitals Were     Pulse Respirations Height Pulse Oximetry BMI (Body Mass Index)       99 20 1.613 m (5' 3.5\") 99% 31.21 kg/m2        Blood Pressure from Last 3 Encounters:   06/21/18 120/76   06/04/18 116/70   05/31/18 124/70    Weight from Last 3 Encounters:   06/21/18 81.2 kg (179 lb)   06/04/18 79.3 kg (174 lb 14.4 oz)   05/31/18 79.4 kg (175 lb)    "           We Performed the Following     SLEEP EVALUATION & MANAGEMENT REFERRAL - ADULT -[facility] [place] [facility] [phone] (Age 13 if over 100 lbs)          Today's Medication Changes          These changes are accurate as of 6/21/18  4:50 PM.  If you have any questions, ask your nurse or doctor.               Start taking these medicines.        Dose/Directions    zolpidem 5 MG tablet   Commonly known as:  AMBIEN   Used for:  Insomnia, unspecified type        Take tablet by mouth 15 minutes prior to sleep, for Sleep Study   Quantity:  1 tablet   Refills:  0            Where to get your medicines      Some of these will need a paper prescription and others can be bought over the counter.  Ask your nurse if you have questions.     Bring a paper prescription for each of these medications     zolpidem 5 MG tablet                Primary Care Provider Office Phone # Fax #    Alize Ragland, NADJA [facility] [phone] [address] [place] [address]        Equal Access to Services     CONCHIS SALCIDO : Hadii ketan ku hadasho Soomaali, waaxda luqadaha, qaybta kaalmada adeegyada, waxay soniain haytanvi good . So [facility] [phone].    ATENCIÓN: Si habla español, tiene a varghese disposición servicios gratuitos de asistencia lingüística. [facility] [phone].    We comply with applicable federal civil rights laws and [place] laws. We do not discriminate on the basis of race, color, national origin, age, disability, sex, sexual orientation, or gender identity.            Thank you!     Thank you for choosing [facility]  for your care. Our goal is always to provide you with excellent care. Hearing back from our patients is one way we can continue to improve our services. Please take a few minutes to complete the written survey that you may receive in the mail after your visit with us. Thank you!             Your Updated Medication List - Protect others around you:  Learn how to safely use, store and throw away your medicines at www.disposemymeds.org.          This list is accurate as of 6/21/18  4:50 PM.  Always use your most recent med list.                   Brand Name Dispense Instructions for use Diagnosis    levothyroxine 75 MCG tablet    SYNTHROID/LEVOTHROID    90 tablet    Take 1 tablet (75 mcg) by mouth daily    Hypothyroidism, unspecified type       metaxalone 800 MG tablet    SKELAXIN    30 tablet    Take 1 tablet (800 mg) by mouth 3 times daily as needed for moderate pain    Upper back strain, initial encounter       naproxen 500 MG tablet    NAPROSYN    30 tablet    Take 1 tablet (500 mg) by mouth 2 times daily as needed for moderate pain    Upper back strain, initial encounter       sertraline 100 MG tablet    ZOLOFT    90 tablet    Take 1 tablet (100 mg) by mouth daily    Adjustment disorder with depressed mood       zolpidem 5 MG tablet    AMBIEN    1 tablet    Take tablet by mouth 15 minutes prior to sleep, for Sleep Study    Insomnia, unspecified type

## 2018-07-12 ENCOUNTER — THERAPY VISIT (OUTPATIENT)
Dept: SLEEP MEDICINE | Facility: CLINIC | Age: 28
End: 2018-07-12
Payer: COMMERCIAL

## 2018-07-12 DIAGNOSIS — R06.83 SNORING: ICD-10-CM

## 2018-07-12 DIAGNOSIS — G25.81 RESTLESS LEGS SYNDROME (RLS): ICD-10-CM

## 2018-07-12 DIAGNOSIS — G47.19 EXCESSIVE DAYTIME SLEEPINESS: ICD-10-CM

## 2018-07-12 PROCEDURE — 95810 POLYSOM 6/> YRS 4/> PARAM: CPT | Performed by: OTOLARYNGOLOGY

## 2018-07-12 NOTE — MR AVS SNAPSHOT
After Visit Summary   7/12/2018    Leah Cox    MRN: [id number]           Patient Information     Date Of Birth          1990        Visit Information        Provider Department      7/12/2018 8:00 PM PH BED 1 [facility]        Today's Diagnoses     Snoring        Excessive daytime sleepiness        Restless legs syndrome (RLS)           Follow-ups after your visit        Your next 10 appointments already scheduled     Aug 02, 2018  1:45 PM CDT   Return Sleep Patient with Ramses Chong MD   [facility] ([facility]    [address] [place] [address] [id number] [phone]              Who to contact     If you have questions or need follow up information about today's clinic visit or your schedule please contact [facility] directly at [phone].  Normal or non-critical lab and imaging results will be communicated to you by MyChart, letter or phone within 4 business days after the clinic has received the results. If you do not hear from us within 7 days, please contact the clinic through MyChart or phone. If you have a critical or abnormal lab result, we will notify you by phone as soon as possible.  Submit refill requests through [employer] or call your pharmacy and they will forward the refill request to us. Please allow 3 business days for your refill to be completed.          Additional Information About Your Visit        Care EveryWhere ID     This is your Care EveryWhere ID. This could be used by other organizations to access your [facility] medical records  [id number]         Blood Pressure from Last 3 Encounters:   06/21/18 120/76   06/04/18 116/70   05/31/18 124/70    Weight from Last 3 Encounters:   06/21/18 81.2 kg (179 lb)   06/04/18 79.3 kg (174 lb 14.4 oz)   05/31/18 79.4 kg (175 lb)              We Performed the Following     Comprehensive Sleep Study        Primary Care Provider  Office Phone # Fax #    Alize Ragland, NADJA [facility] [phone] [address] [place] [address]        Equal Access to Services     CONCHIS SALCIDO : Anahy mercado Sosreekanth, waaxda luqadaha, qaybta kaalmada krupa, maday soniain hayaadavid charlesdanie dblzuleanna finley. So [facility] [phone].    ATENCIÓN: Si habla español, tiene a varghese disposición servicios gratuitos de asistencia lingüística. Llame al [phone].    We comply with applicable federal civil rights laws and [place] laws. We do not discriminate on the basis of race, color, national origin, age, disability, sex, sexual orientation, or gender identity.            Thank you!     Thank you for choosing [facility]  for your care. Our goal is always to provide you with excellent care. Hearing back from our patients is one way we can continue to improve our services. Please take a few minutes to complete the written survey that you may receive in the mail after your visit with us. Thank you!             Your Updated Medication List - Protect others around you: Learn how to safely use, store and throw away your medicines at www.disposemymeds.org.          This list is accurate as of 7/12/18 11:59 PM.  Always use your most recent med list.                   Brand Name Dispense Instructions for use Diagnosis    levothyroxine 75 MCG tablet    SYNTHROID/LEVOTHROID    90 tablet    Take 1 tablet (75 mcg) by mouth daily    Hypothyroidism, unspecified type       metaxalone 800 MG tablet    SKELAXIN    30 tablet    Take 1 tablet (800 mg) by mouth 3 times daily as needed for moderate pain    Upper back strain, initial encounter       naproxen 500 MG tablet    NAPROSYN    30 tablet    Take 1 tablet (500 mg) by mouth 2 times daily as needed for moderate pain    Upper back strain, initial encounter       sertraline 100 MG tablet    ZOLOFT    90 tablet    Take 1 tablet (100 mg) by mouth daily    Adjustment disorder with depressed  mood       zolpidem 5 MG tablet    AMBIEN    1 tablet    Take tablet by mouth 15 minutes prior to sleep, for Sleep Study    Insomnia, unspecified type

## 2018-07-20 DIAGNOSIS — F43.21 ADJUSTMENT DISORDER WITH DEPRESSED MOOD: ICD-10-CM

## 2018-07-20 NOTE — TELEPHONE ENCOUNTER
"Requested Prescriptions   Pending Prescriptions Disp Refills     sertraline (ZOLOFT) 100 MG tablet 90 tablet 0    Last Written Prescription Date:  04/13/2018  Last Fill Quantity: 90,  # refills: 0   Last office visit: 6/4/2018 with prescribing provider:  06/04/2018   Future Office Visit:     Sig: Take 1 tablet (100 mg) by mouth daily    SSRIs Protocol Passed    7/20/2018  3:13 PM       Passed - Recent (12 mo) or future (30 days) visit within the authorizing provider's specialty    Patient had office visit in the last 12 months or has a visit in the next 30 days with authorizing provider or within the authorizing provider's specialty.  See \"Patient Info\" tab in inbasket, or \"Choose Columns\" in Meds & Orders section of the refill encounter.           Passed - Patient is age 18 or older       Passed - No active pregnancy on record       Passed - No positive pregnancy test in last 12 months          "

## 2018-07-23 ENCOUNTER — HOSPITAL ENCOUNTER (EMERGENCY)
Facility: CLINIC | Age: 28
Discharge: HOME | End: 2018-07-23
Attending: EMERGENCY MEDICINE | Admitting: EMERGENCY MEDICINE
Payer: COMMERCIAL

## 2018-07-23 ENCOUNTER — TELEPHONE (OUTPATIENT)
Dept: FAMILY MEDICINE | Facility: CLINIC | Age: 28
End: 2018-07-23

## 2018-07-23 VITALS
RESPIRATION RATE: 18 BRPM | WEIGHT: 180.5 LBS | TEMPERATURE: 97.6 F | OXYGEN SATURATION: 97 % | DIASTOLIC BLOOD PRESSURE: 89 MMHG | BODY MASS INDEX: 31.47 KG/M2 | SYSTOLIC BLOOD PRESSURE: 125 MMHG

## 2018-07-23 DIAGNOSIS — R19.7 DIARRHEA, UNSPECIFIED TYPE: ICD-10-CM

## 2018-07-23 LAB
ANION GAP SERPL CALCULATED.3IONS-SCNC: 6 MMOL/L (ref 3–14)
BASOPHILS # BLD AUTO: 0.1 10E9/L (ref 0–0.2)
BASOPHILS NFR BLD AUTO: 0.5 %
BUN SERPL-MCNC: 10 MG/DL (ref 7–30)
CALCIUM SERPL-MCNC: 9.4 MG/DL (ref 8.5–10.1)
CHLORIDE SERPL-SCNC: 106 MMOL/L (ref 94–109)
CO2 SERPL-SCNC: 27 MMOL/L (ref 20–32)
CREAT SERPL-MCNC: 0.68 MG/DL (ref 0.52–1.04)
DIFFERENTIAL METHOD BLD: ABNORMAL
EOSINOPHIL NFR BLD AUTO: 1 %
ERYTHROCYTE [DISTWIDTH] IN BLOOD BY AUTOMATED COUNT: 12.5 % (ref 10–15)
GFR SERPL CREATININE-BSD FRML MDRD: >90 ML/MIN/1.7M2
GLUCOSE SERPL-MCNC: 77 MG/DL (ref 70–99)
HCT VFR BLD AUTO: 42.5 % (ref 35–47)
HGB BLD-MCNC: 14.7 G/DL (ref 11.7–15.7)
IMM GRANULOCYTES # BLD: 0.1 10E9/L (ref 0–0.4)
IMM GRANULOCYTES NFR BLD: 0.9 %
LYMPHOCYTES # BLD AUTO: 2.2 10E9/L (ref 0.8–5.3)
LYMPHOCYTES NFR BLD AUTO: 15.8 %
MCH RBC QN AUTO: 31 PG (ref 26.5–33)
MCHC RBC AUTO-ENTMCNC: 34.6 G/DL (ref 31.5–36.5)
MCV RBC AUTO: 90 FL (ref 78–100)
MONOCYTES # BLD AUTO: 0.7 10E9/L (ref 0–1.3)
MONOCYTES NFR BLD AUTO: 4.8 %
NEUTROPHILS # BLD AUTO: 10.8 10E9/L (ref 1.6–8.3)
NEUTROPHILS NFR BLD AUTO: 77 %
NRBC # BLD AUTO: 0 10*3/UL
NRBC BLD AUTO-RTO: 0 /100
PLATELET # BLD AUTO: 298 10E9/L (ref 150–450)
POTASSIUM SERPL-SCNC: 3.7 MMOL/L (ref 3.4–5.3)
RBC # BLD AUTO: 4.74 10E12/L (ref 3.8–5.2)
SLPCOMP: NORMAL
SODIUM SERPL-SCNC: 139 MMOL/L (ref 133–144)
TSH SERPL DL<=0.005 MIU/L-ACNC: 8.71 MU/L (ref 0.4–4)
WBC # BLD AUTO: 14 10E9/L (ref 4–11)

## 2018-07-23 PROCEDURE — 99283 EMERGENCY DEPT VISIT LOW MDM: CPT | Mod: Z6 | Performed by: EMERGENCY MEDICINE

## 2018-07-23 PROCEDURE — 25000128 H RX IP 250 OP 636: Performed by: EMERGENCY MEDICINE

## 2018-07-23 PROCEDURE — 87177 OVA AND PARASITES SMEARS: CPT | Performed by: EMERGENCY MEDICINE

## 2018-07-23 PROCEDURE — 87506 IADNA-DNA/RNA PROBE TQ 6-11: CPT | Performed by: EMERGENCY MEDICINE

## 2018-07-23 PROCEDURE — 84443 ASSAY THYROID STIM HORMONE: CPT | Performed by: EMERGENCY MEDICINE

## 2018-07-23 PROCEDURE — 80048 BASIC METABOLIC PNL TOTAL CA: CPT | Performed by: EMERGENCY MEDICINE

## 2018-07-23 PROCEDURE — 99283 EMERGENCY DEPT VISIT LOW MDM: CPT | Performed by: EMERGENCY MEDICINE

## 2018-07-23 PROCEDURE — 87209 SMEAR COMPLEX STAIN: CPT | Performed by: EMERGENCY MEDICINE

## 2018-07-23 PROCEDURE — 85025 COMPLETE CBC W/AUTO DIFF WBC: CPT | Performed by: EMERGENCY MEDICINE

## 2018-07-23 RX ORDER — SODIUM CHLORIDE 9 MG/ML
1000 INJECTION, SOLUTION INTRAVENOUS CONTINUOUS
Status: DISCONTINUED | OUTPATIENT
Start: 2018-07-23 | End: 2018-07-23 | Stop reason: HOSPADM

## 2018-07-23 RX ADMIN — SODIUM CHLORIDE 1000 ML: 9 INJECTION, SOLUTION INTRAVENOUS at 15:09

## 2018-07-23 NOTE — DISCHARGE INSTRUCTIONS
Diarrhea with Uncertain Cause (Adult)    Diarrhea is when stools are loose and watery. This can be caused by:    Viral infections    Bacterial infections    Food poisoning    Parasites    Irritable bowel syndrome (IBS)    Inflammatory bowel diseases such as ulcerative colitis, Crohn's disease, and celiac disease    Food intolerance, such as to lactose, the sugar found in milk and milk products    Reaction to medicines like antibiotics, laxatives, cancer drugs, and antacids  Along with diarrhea, you may also have:    Abdominal pain and cramping    Nausea and vomiting    Loss of bowel control    Fever and chills    Bloody stools  In some cases, antibiotics may help to treat diarrhea. You may have a stool sample test. This is done to see what is causing your diarrhea, and if antibiotics will help treat it. The results of a stool sample test may take up to 2 days. The healthcare provider may not give you antibiotics until he or she has the stool test results.  Diarrhea can cause dehydration. This is the loss of too much water and other fluids from the body. When this occurs, body fluid must be replaced. This can be done with oral rehydration solutions. Oral rehydration solutions are available at drugstores and grocery stores without a prescription.  Home care  Follow all instructions given by your healthcare provider. Rest at home for the next 24 hours, or until you feel better. Avoid caffeine, tobacco, and alcohol. These can make diarrhea, cramping, and pain worse.  If taking medicines:    Don t take over-the-counter diarrhea or nausea medicines unless your healthcare provider tells you to.    You may use acetaminophen or NSAID medicines like ibuprofen or naproxen to reduce pain and fever. Don t use these if you have chronic liver or kidney disease, or ever had a stomach ulcer or gastrointestinal bleeding. Don't use NSAID medicines if you are already taking one for another condition (like arthritis) or are on daily  aspirin therapy (such as for heart disease or after a stroke). Talk with your healthcare provider first.    If antibiotics were prescribed, be sure you take them until they are finished. Don t stop taking them even when you feel better. Antibiotics must be taken as a full course.  To prevent the spread of illness:    Remember that washing with soap and water and using alcohol-based  is the best way to prevent the spread of infection.    Clean the toilet after each use.    Wash your hands before eating.    Wash your hands before and after preparing food. Keep in mind that people with diarrhea or vomiting should not prepare food for others.    Wash your hands after using cutting boards, countertops, and knives that have been in contact with raw foods.    Wash and then peel fruits and vegetables.    Keep uncooked meats away from cooked and ready-to-eat foods.    Use a food thermometer when cooking. Cook poultry to at least 165 F (74 C). Cook ground meat (beef, veal, pork, lamb) to at least 160 F (71 C). Cook fresh beef, veal, lamb, and pork to at least 145 F (63 C).    Don t eat raw or undercooked eggs (poached or serena side up), poultry, meat, or unpasteurized milk and juices.  Food and drinks  The main goal while treating vomiting or diarrhea is to prevent dehydration. This is done by taking small amounts of liquids often.    Keep in mind that liquids are more important than food right now.    Drink only small amounts of liquids at a time.    Don t force yourself to eat, especially if you are having cramping, vomiting, or diarrhea. Don t eat large amounts at a time, even if you are hungry.    If you eat, avoid fatty, greasy, spicy, or fried foods.    Don t eat dairy foods or drink milk if you have diarrhea. These can make diarrhea worse.  During the first 24 hours you can try:    Oral rehydration solutions. Do not use sports drinks. They have too much sugar and not enough electrolytes.    Soft drinks without  caffeine    Ginger ale    Water (plain or flavored)    Decaf tea or coffee    Clear broth, consommé, or bouillon    Gelatin, popsicles, or frozen fruit juice bars  The second 24 hours, if you are feeling better, you can add:    Hot cereal, plain toast, bread, rolls, or crackers    Plain noodles, rice, mashed potatoes, chicken noodle soup, or rice soup    Unsweetened canned fruit (no pineapple)    Bananas  As you recover:    Limit fat intake to less than 15 grams per day. Don t eat margarine, butter, oils, mayonnaise, sauces, gravies, fried foods, peanut butter, meat, poultry, or fish.    Limit fiber. Don t eat raw or cooked vegetables, fresh fruits except bananas, or bran cereals.    Limit caffeine and chocolate.    Limit dairy.    Don t use spices or seasonings except salt.    Go back to your normal diet over time, as you feel better and your symptoms improve.    If the symptoms come back, go back to a simple diet or clear liquids.  Follow-up care  Follow up with your healthcare provider, or as advised. If a stool sample was taken or cultures were done, call the healthcare provider for the results as instructed.  Call 911  Call 911 if you have any of these symptoms:    Trouble breathing    Confusion    Extreme drowsiness or trouble walking    Loss of consciousness    Rapid heart rate    Chest pain    Stiff neck    Seizure  When to seek medical advice  Call your healthcare provider right away if any of these occur:    Abdominal pain that gets worse    Constant lower right abdominal pain    Continued vomiting and inability to keep liquids down    Diarrhea more than 5 times a day    Blood in vomit or stool    Dark urine or no urine for 8 hours, dry mouth and tongue, tiredness, weakness, or dizziness    Drowsiness    New rash    You don t get better in 2 to 3 days    Fever of 100.4 F (38 C) or higher, or as directed by your healthcare provider  Date Last Reviewed: 1/3/2016    1510-1972 [employer] [address]  [place] [address]. All rights reserved. This information is not intended as a substitute for professional medical care. Always follow your healthcare professional's instructions.

## 2018-07-23 NOTE — TELEPHONE ENCOUNTER
: 1990  PHONE #'s: [phone] (home) NONE (work)    PRESENTING PROBLEM:  C/O x6 diarrhea today.  She is at work and is feeling weak, shaky and like she might faint . What to do?  NURSING ASSESSMENT  Description:  As above  Onset/duration:   Today   Precip. factors:  I had eaten 2 cheeseburgers, Greek yogurt with bananas.   Assoc. Sx:  Denies fever( that she knows of)  No abdominal pain. No vomiting.   Improves/worsens Sx:   same  Pain scale (1-10)   0/10  Sx specific meds:  NONE  LMP/preg/breast feeding:   NA  Last exam/Tx:  Has NOT been seen for this.     RECOMMENDED DISPOSITION:  To ED, another person to drive -   Will comply with recommendation: YES   If further questions/concerns or if Sx do not improve, worsen or new Sx develop, call your PCP or [facility] as soon as possible.    NOTES:  Disposition was determined by the first positive assessment question, therefore all previous assessment questions were negative.  Informed to check provider manual or call insurance company to assure coverage.    Guideline used: Diarrhea, Adult  Telephone Triage Protocols for Nurses, Fifth Edition, Jane Hargrove RN

## 2018-07-23 NOTE — ED PROVIDER NOTES
"  History     Chief Complaint   Patient presents with     Diarrhea     The history is provided by the patient.     Leah Cox is a 27 year old female who presents to the emergency department for diarrhea. Patient reports having diarrhea since 0500 today. She states she has had 9 stools since, \"mush looking\". Patient states she has gas and bloating feeling prior to diarrhea. She states she is more lightheaded, cold and clammy today. Patient denies abdominal pain, nausea, vomiting, fever, blood in stool, chest pain or shortness of breath. Patient denies any recent exposure to anyone else with this illness, no recent camping and no international travel. Patient states she has tried Imodium and drinking more flavored water.  She reports the last time she had this was about 10 days ago. Patient reports had this same kind of episode about 1 month ago, symptoms lasted for about 2 weeks, the diarrhea was like \"pure water and looked like grease\". She states her stools have seemed different since starting her thyroid medication. Patient states she smokes about 6 cigarettes a day and does not drink alcohol.    Problem List:    Patient Active Problem List    Diagnosis Date Noted     Tobacco use disorder 05/01/2018     Priority: Medium     Major depressive disorder, recurrent episode, moderate (H) 04/20/2018     Priority: Medium     RENAY (generalized anxiety disorder) 04/20/2018     Priority: Medium     Segmental dysfunction of sacral region 01/31/2018     Priority: Medium     Bilateral low back pain with right-sided sciatica 01/31/2018     Priority: Medium     Segmental dysfunction of lumbar region 01/31/2018     Priority: Medium     Segmental dysfunction of thoracic region 01/31/2018     Priority: Medium     Ganglion cyst of dorsum of right wrist 07/24/2017     Priority: Medium     Metacarpal boss 07/24/2017     Priority: Medium     Excessive or frequent menstruation 03/14/2017     Priority: Medium     Adjustment " disorder with depressed mood 03/03/2017     Priority: Medium     Carpal tunnel syndrome of right wrist 10/18/2016     Priority: Medium     Irregular periods 12/08/2015     Priority: Medium     Pelvic pain in female 12/08/2015     Priority: Medium     S/P LEEP of cervix 01/23/2013     Priority: Medium     5/15/09 NIL pap  6/30/11 ASCUS pap, + HR HPV.   8/30/11 [place] bx: SUZAN 2, ECC SUZAN 1  3/27/12 [place] bx: SUZAN 1, ECC: neg.  Pap: NIL, + HR HPV  9/26/12 ASCUS pap, + HR HPV  11/7/12 [place] bx: SUZAN 2, ECC: SUZAN 1.   1/23/13 LEEP: SUZAN 2 with free margins.   6/25/13 NIL pap     ** above information is from [facility]  **  12/8/15 NIL pap, neg HPV. Plan: cotest in 1 yr, per Staff message from Dr. Farnsworth dated 2/5/18.  4/13/18 NIL pap, neg HR HPV.Plan: cotest in 3 years.          Past Medical History:    Past Medical History:   Diagnosis Date     ASCUS with positive high risk HPV cervical 09/26/2012     H/O colposcopy with cervical biopsy 11/07/2012     S/P LEEP of cervix 01/23/2013       Past Surgical History:    Past Surgical History:   Procedure Laterality Date     LEEP TX, CERVICAL  01/23/2013    SUZAN 2 with free margins - [facility].     RELEASE CARPAL TUNNEL Right 10/5/2016    Procedure: RELEASE CARPAL TUNNEL;  Surgeon: Christian Sebastian MD;  Location:  OR       Family History:    Family History   Problem Relation Age of Onset     Hypertension Father        Social History:  Marital Status:  Single [1]  Social History   Substance Use Topics     Smoking status: Current Every Day Smoker     Packs/day: 0.25     Smokeless tobacco: Never Used     Alcohol use 0.0 oz/week     0 Standard drinks or equivalent per week      Comment: occ.        Medications:      levothyroxine (SYNTHROID/LEVOTHROID) 75 MCG tablet   naproxen (NAPROSYN) 500 MG tablet   sertraline (ZOLOFT) 100 MG tablet   zolpidem (AMBIEN) 5 MG tablet   metaxalone (SKELAXIN) 800 MG tablet         Review of Systems   All other systems reviewed and are  negative.      Physical Exam   BP: 125/89  Heart Rate: 71  Temp: 97.6  F (36.4  C)  Resp: 18  Weight: 81.9 kg (180 lb 8 oz)  SpO2: 97 %      Physical Exam   Constitutional: She is oriented to person, place, and time. She appears well-developed and well-nourished. No distress.   HENT:   Head: Normocephalic and atraumatic.   Mouth/Throat: Oropharynx is clear and moist. No oropharyngeal exudate.   Eyes: Pupils are equal, round, and reactive to light. No scleral icterus.   Neck: Normal range of motion. Neck supple.   Cardiovascular: Normal rate, regular rhythm, normal heart sounds and intact distal pulses.    Pulmonary/Chest: Effort normal and breath sounds normal. No respiratory distress. She has no wheezes. She has no rales.   Abdominal: Soft. Bowel sounds are normal. There is no tenderness. There is no rebound and no guarding.   Musculoskeletal: Normal range of motion. She exhibits no edema or tenderness.   Lymphadenopathy:     She has no cervical adenopathy.   Neurological: She is alert and oriented to person, place, and time.   Skin: Skin is warm and dry. No rash noted. She is not diaphoretic.   Psychiatric: She has a normal mood and affect. Her behavior is normal. Judgment normal.   Nursing note and vitals reviewed.      ED Course     ED Course     Procedures                   Results for orders placed or performed during the hospital encounter of 07/23/18 (from the past 24 hour(s))   CBC with platelets differential   Result Value Ref Range    WBC 14.0 (H) 4.0 - 11.0 10e9/L    RBC Count 4.74 3.8 - 5.2 10e12/L    Hemoglobin 14.7 11.7 - 15.7 g/dL    Hematocrit 42.5 35.0 - 47.0 %    MCV 90 78 - 100 fl    MCH 31.0 26.5 - 33.0 pg    MCHC 34.6 31.5 - 36.5 g/dL    RDW 12.5 10.0 - 15.0 %    Platelet Count 298 150 - 450 10e9/L    Diff Method Automated Method     % Neutrophils 77.0 %    % Lymphocytes 15.8 %    % Monocytes 4.8 %    % Eosinophils 1.0 %    % Basophils 0.5 %    % Immature Granulocytes 0.9 %    Nucleated RBCs 0  0 /100    Absolute Neutrophil 10.8 (H) 1.6 - 8.3 10e9/L    Absolute Lymphocytes 2.2 0.8 - 5.3 10e9/L    Absolute Monocytes 0.7 0.0 - 1.3 10e9/L    Absolute Basophils 0.1 0.0 - 0.2 10e9/L    Abs Immature Granulocytes 0.1 0 - 0.4 10e9/L    Absolute Nucleated RBC 0.0    Basic metabolic panel   Result Value Ref Range    Sodium 139 133 - 144 mmol/L    Potassium 3.7 3.4 - 5.3 mmol/L    Chloride 106 94 - 109 mmol/L    Carbon Dioxide 27 20 - 32 mmol/L    Anion Gap 6 3 - 14 mmol/L    Glucose 77 70 - 99 mg/dL    Urea Nitrogen 10 7 - 30 mg/dL    Creatinine 0.68 0.52 - 1.04 mg/dL    GFR Estimate >90 >60 mL/min/1.7m2    GFR Estimate If Black >90 >60 mL/min/1.7m2    Calcium 9.4 8.5 - 10.1 mg/dL   TSH   Result Value Ref Range    TSH 8.71 (H) 0.40 - 4.00 mU/L       Medications   0.9% sodium chloride BOLUS (0 mLs Intravenous Stopped 7/23/18 1557)       Assessments & Plan (with Medical Decision Making)  27-year-old female with intermittent loose stools and diarrhea.  Stool culture is pending.  No recent antibiotics to suggest C. difficile colitis.  She has no abdominal pain on exam therefore CT scan not indicated.  tsh little high.  Patient will follow up with primary care provider regarding her elevated TSH and her ongoing problems with diarrhea.  This could be related to irritable bowel syndrome or infectious etiology.     I have reviewed the nursing notes.    I have reviewed the findings, diagnosis, plan and need for follow up with the patient.      Discharge Medication List as of 7/23/2018  3:57 PM          Final diagnoses:   Diarrhea, unspecified type     This document serves as a record of services personally performed by Venu Alonzo MD. It was created on their behalf by Adore Weber, a trained medical scribe. The creation of this record is based on the provider's personal observations and the statements of the patient. This document has been checked and approved by the attending provider.    Note: Chart documentation done in part  with Dragon Voice Recognition software. Although reviewed after completion, some word and grammatical errors may remain.    7/23/2018   [facility] EMERGENCY DEPARTMENT     Javier Alonzo MD  07/23/18 1939

## 2018-07-23 NOTE — TELEPHONE ENCOUNTER
Routing refill request to provider for review/approval because:  Medication was increased in April. According to last office visit, pt was to follow up in a month. No appt scheduled.   Fely Moon, RN, BSN

## 2018-07-23 NOTE — ED AVS SNAPSHOT
[facility] Emergency Department    [address] [place] [address]    Phone:  [phone]    Fax:  [phone]                                       Leah Cox   MRN: [id number]    Department:  [facility] Emergency Department   Date of Visit:  7/23/2018           After Visit Summary Signature Page     I have received my discharge instructions, and my questions have been answered. I have discussed any challenges I see with this plan with the nurse or doctor.    ..........................................................................................................................................  Patient/Patient Representative Signature      ..........................................................................................................................................  Patient Representative Print Name and Relationship to Patient    ..................................................               ................................................  Date                                            Time    ..........................................................................................................................................  Reviewed by Signature/Title    ...................................................              ..............................................  Date                                                            Time

## 2018-07-23 NOTE — ED AVS SNAPSHOT
[facility] Emergency Department    [address] [place] [address]    Phone:  [phone]    Fax:  [phone]                                       Leah Cox   MRN: [id number]    Department:  [facility] Emergency Department   Date of Visit:  7/23/2018           Patient Information     Date Of Birth          1990        Your diagnoses for this visit were:     Diarrhea, unspecified type        You were seen by Javier Alonzo MD.      Follow-up Information     Follow up with Alize Ragland APRN CNP.    Specialty:  Nurse Practitioner - Family    Contact information:    [address] [place] [address]  [phone]          Schedule an appointment as soon as possible for a visit to follow up.    Why:  ER follow up        Discharge Instructions         Diarrhea with Uncertain Cause (Adult)    Diarrhea is when stools are loose and watery. This can be caused by:    Viral infections    Bacterial infections    Food poisoning    Parasites    Irritable bowel syndrome (IBS)    Inflammatory bowel diseases such as ulcerative colitis, Crohn's disease, and celiac disease    Food intolerance, such as to lactose, the sugar found in milk and milk products    Reaction to medicines like antibiotics, laxatives, cancer drugs, and antacids  Along with diarrhea, you may also have:    Abdominal pain and cramping    Nausea and vomiting    Loss of bowel control    Fever and chills    Bloody stools  In some cases, antibiotics may help to treat diarrhea. You may have a stool sample test. This is done to see what is causing your diarrhea, and if antibiotics will help treat it. The results of a stool sample test may take up to 2 days. The healthcare provider may not give you antibiotics until he or she has the stool test results.  Diarrhea can cause dehydration. This is the loss of too much water and other fluids from the body. When this occurs, body fluid must be replaced. This can be  done with oral rehydration solutions. Oral rehydration solutions are available at drugstores and grocery stores without a prescription.  Home care  Follow all instructions given by your healthcare provider. Rest at home for the next 24 hours, or until you feel better. Avoid caffeine, tobacco, and alcohol. These can make diarrhea, cramping, and pain worse.  If taking medicines:    Don t take over-the-counter diarrhea or nausea medicines unless your healthcare provider tells you to.    You may use acetaminophen or NSAID medicines like ibuprofen or naproxen to reduce pain and fever. Don t use these if you have chronic liver or kidney disease, or ever had a stomach ulcer or gastrointestinal bleeding. Don't use NSAID medicines if you are already taking one for another condition (like arthritis) or are on daily aspirin therapy (such as for heart disease or after a stroke). Talk with your healthcare provider first.    If antibiotics were prescribed, be sure you take them until they are finished. Don t stop taking them even when you feel better. Antibiotics must be taken as a full course.  To prevent the spread of illness:    Remember that washing with soap and water and using alcohol-based  is the best way to prevent the spread of infection.    Clean the toilet after each use.    Wash your hands before eating.    Wash your hands before and after preparing food. Keep in mind that people with diarrhea or vomiting should not prepare food for others.    Wash your hands after using cutting boards, countertops, and knives that have been in contact with raw foods.    Wash and then peel fruits and vegetables.    Keep uncooked meats away from cooked and ready-to-eat foods.    Use a food thermometer when cooking. Cook poultry to at least 165 F (74 C). Cook ground meat (beef, veal, pork, lamb) to at least 160 F (71 C). Cook fresh beef, veal, lamb, and pork to at least 145 F (63 C).    Don t eat raw or undercooked eggs  (poached or serena side up), poultry, meat, or unpasteurized milk and juices.  Food and drinks  The main goal while treating vomiting or diarrhea is to prevent dehydration. This is done by taking small amounts of liquids often.    Keep in mind that liquids are more important than food right now.    Drink only small amounts of liquids at a time.    Don t force yourself to eat, especially if you are having cramping, vomiting, or diarrhea. Don t eat large amounts at a time, even if you are hungry.    If you eat, avoid fatty, greasy, spicy, or fried foods.    Don t eat dairy foods or drink milk if you have diarrhea. These can make diarrhea worse.  During the first 24 hours you can try:    Oral rehydration solutions. Do not use sports drinks. They have too much sugar and not enough electrolytes.    Soft drinks without caffeine    Ginger ale    Water (plain or flavored)    Decaf tea or coffee    Clear broth, consommé, or bouillon    Gelatin, popsicles, or frozen fruit juice bars  The second 24 hours, if you are feeling better, you can add:    Hot cereal, plain toast, bread, rolls, or crackers    Plain noodles, rice, mashed potatoes, chicken noodle soup, or rice soup    Unsweetened canned fruit (no pineapple)    Bananas  As you recover:    Limit fat intake to less than 15 grams per day. Don t eat margarine, butter, oils, mayonnaise, sauces, gravies, fried foods, peanut butter, meat, poultry, or fish.    Limit fiber. Don t eat raw or cooked vegetables, fresh fruits except bananas, or bran cereals.    Limit caffeine and chocolate.    Limit dairy.    Don t use spices or seasonings except salt.    Go back to your normal diet over time, as you feel better and your symptoms improve.    If the symptoms come back, go back to a simple diet or clear liquids.  Follow-up care  Follow up with your healthcare provider, or as advised. If a stool sample was taken or cultures were done, call the healthcare provider for the results as  instructed.  Call 911  Call 911 if you have any of these symptoms:    Trouble breathing    Confusion    Extreme drowsiness or trouble walking    Loss of consciousness    Rapid heart rate    Chest pain    Stiff neck    Seizure  When to seek medical advice  Call your healthcare provider right away if any of these occur:    Abdominal pain that gets worse    Constant lower right abdominal pain    Continued vomiting and inability to keep liquids down    Diarrhea more than 5 times a day    Blood in vomit or stool    Dark urine or no urine for 8 hours, dry mouth and tongue, tiredness, weakness, or dizziness    Drowsiness    New rash    You don t get better in 2 to 3 days    Fever of 100.4 F (38 C) or higher, or as directed by your healthcare provider  Date Last Reviewed: 1/3/2016    1727-8741 [employer]. [address] [place] [address]. All rights reserved. This information is not intended as a substitute for professional medical care. Always follow your healthcare professional's instructions.          Your next 10 appointments already scheduled     Aug 02, 2018  1:45 PM CDT   Return Sleep Patient with Ramses Chong MD   [facility] [place] [place]    [address] [place] [id number] [phone]              24 Hour Appointment Hotline       To make an appointment at any [facility], call [phone] ([phone]). If you don't have a family doctor or clinic, we will help you find one. [facility] clinics are conveniently located to serve the needs of you and your family.             Review of your medicines      Our records show that you are taking the medicines listed below. If these are incorrect, please call your family doctor or clinic.        Dose / Directions Last dose taken    levothyroxine 75 MCG tablet   Commonly known as:  SYNTHROID/LEVOTHROID   Dose:  75 mcg   Quantity:  90 tablet        Take 1 tablet (75 mcg) by mouth  daily   Refills:  3        metaxalone 800 MG tablet   Commonly known as:  SKELAXIN   Dose:  800 mg   Quantity:  30 tablet        Take 1 tablet (800 mg) by mouth 3 times daily as needed for moderate pain   Refills:  1        naproxen 500 MG tablet   Commonly known as:  NAPROSYN   Dose:  500 mg   Quantity:  30 tablet        Take 1 tablet (500 mg) by mouth 2 times daily as needed for moderate pain   Refills:  1        sertraline 100 MG tablet   Commonly known as:  ZOLOFT   Dose:  100 mg   Quantity:  90 tablet        Take 1 tablet (100 mg) by mouth daily   Refills:  0        zolpidem 5 MG tablet   Commonly known as:  AMBIEN   Quantity:  1 tablet        Take tablet by mouth 15 minutes prior to sleep, for Sleep Study   Refills:  0                Procedures and tests performed during your visit     Basic metabolic panel    CBC with platelets differential    Peripheral IV catheter    TSH      Orders Needing Specimen Collection     Ordered          07/23/18 1454  Enteric Bacteria and Virus Panel by SHANNEN Stool - STAT, Prio: STAT, Needs to be Collected     Scheduled Task Status   07/23/18 1455 Collect Enteric Bacteria and Virus Panel by SHANNEN Stool Open   Order Class:  PCU Collect                07/23/18 1454  Ova and Parasite Exam Routine - STAT, Prio: STAT, Needs to be Collected     Scheduled Task Status   07/23/18 1455 Collect Ova and Parasite Exam Routine Open   Order Class:  PCU Collect                  Pending Results     Date and Time Order Name Status Description    7/23/2018 1509 TSH In process             Pending Culture Results     No orders found from 7/21/2018 to 7/24/2018.            Pending Results Instructions     If you had any lab results that were not finalized at the time of your Discharge, you can call the ED Lab Result RN at [phone]. You will be contacted by this team for any positive Lab results or changes in treatment. The nurses are available 7 days a week from 10A to 6:30P.  You can leave a message  24 hours per day and they will return your call.        Thank you for choosing [facility]       Thank you for choosing [facility] for your care. Our goal is always to provide you with excellent care. Hearing back from our patients is one way we can continue to improve our services. Please take a few minutes to complete the written survey that you may receive in the mail after you visit with us. Thank you!        Care EveryWhere ID     This is your Care EveryWhere ID. This could be used by other organizations to access your [facility] medical records  [id number]        Equal Access to Services     CONCHIS SALCIDO : Anahy Garcia, wamichael gonzalez, ken kaalnicholas gentile, maday good . So [facility] [phone].    ATENCIÓN: Si habla español, tiene a varghese disposición servicios gratuitos de asistencia lingüística. Llame al [phone].    We comply with applicable federal civil rights laws and [place] laws. We do not discriminate on the basis of race, color, national origin, age, disability, sex, sexual orientation, or gender identity.            After Visit Summary       This is your record. Keep this with you and show to your community pharmacist(s) and doctor(s) at your next visit.

## 2018-07-23 NOTE — ED NOTES
"Pt reports diarrhea since 0500.  Pt has been able to drink and had a banana and yogurt.  Pt denies nausea or diarrhea.  Pt states stools are \"shredded and the rest is liquid.\"  Pt states she's had \"shakes and cold feeling.\"  \"When I walk I feel like I'm floating on clouds.\"  Pt had a two week episode of intermittent mucus like diarrhea which resolved.    "

## 2018-07-24 LAB
C COLI+JEJUNI+LARI FUSA STL QL NAA+PROBE: NOT DETECTED
EC STX1 GENE STL QL NAA+PROBE: NOT DETECTED
EC STX2 GENE STL QL NAA+PROBE: NOT DETECTED
ENTERIC PATHOGEN COMMENT: NORMAL
NOROV GI+II ORF1-ORF2 JNC STL QL NAA+PR: NOT DETECTED
O+P STL MICRO: NORMAL
O+P STL MICRO: NORMAL
RVA NSP5 STL QL NAA+PROBE: NOT DETECTED
SALMONELLA SP RPOD STL QL NAA+PROBE: NOT DETECTED
SHIGELLA SP+EIEC IPAH STL QL NAA+PROBE: NOT DETECTED
SPECIMEN SOURCE: NORMAL
V CHOL+PARA RFBL+TRKH+TNAA STL QL NAA+PR: NOT DETECTED
Y ENTERO RECN STL QL NAA+PROBE: NOT DETECTED

## 2018-07-24 RX ORDER — SERTRALINE HYDROCHLORIDE 100 MG/1
100 TABLET, FILM COATED ORAL DAILY
Qty: 90 TABLET | Refills: 0 | Status: SHIPPED | OUTPATIENT
Start: 2018-07-24 | End: 2018-11-13

## 2018-07-24 NOTE — TELEPHONE ENCOUNTER
Patient calling back to follow up from ED visit yesterday & states the thyroid numbers are very high at 8.7 & wanted to discuss further with PCP, please advise.  [phone]  Thank you,  Carole Matamoros  Patient Representative

## 2018-07-24 NOTE — TELEPHONE ENCOUNTER
EUGENIA on id vm to return call. Informed patient to make ED f/u appointment with provider. Devante/MA

## 2018-07-30 NOTE — PROGRESS NOTES
Body mass index is 30.79 kg/(m^2).    Weight management plan: Patient was referred to their PCP to discuss a diet and exercise plan.    BP Readings from Last 1 Encounters:   08/02/18 124/83        BP noted to be well controlled today in office.     [place] IS a smoker/uses chewing tobacco.  [place] is ready to quit    Jane Aldridge MA on 8/2/2018 at 1:45 PM    Sleep Study Follow-Up Visit:    Date on this visit: 8/2/2018    Leah Cox comes in today for follow-up of her sleep study done on 7/12/18at the [facility] for unrefreshed sleep, possible sleep apnea and snoring.    Sleep latency 22 minutes with Ambien.  REM achieved.   REM latency 283 minutes.  Sleep efficiency 81%. Total sleep time 385 minutes.    Sleep architecture:  Stage 1, 9.5% (5%), stage 2, 44.5% (45-55%), stage 3, 32.4% (15-20%), stage REM, 13.6% (20-25%).  AHI was 3.7, without desaturations. RDI 11.2.  REM RDI 12.6, consistent with mild REM JOURDAN.  Supine RDI 6.3, consistent with mild SUPINE JOURDAN.  Periodic Limb Movement Index 0/hour.       These findings were reviewed with patient.     Past medical/surgical history, family history, social history, medications and allergies were reviewed.      Problem List:  Patient Active Problem List    Diagnosis Date Noted     Diarrhea, unspecified type 08/02/2018     Priority: Medium     Abdominal pain, generalized 08/02/2018     Priority: Medium     Hypothyroidism, unspecified type 08/02/2018     Priority: Medium     Tobacco use disorder 05/01/2018     Priority: Medium     Major depressive disorder, recurrent episode, moderate (H) 04/20/2018     Priority: Medium     RENAY (generalized anxiety disorder) 04/20/2018     Priority: Medium     Segmental dysfunction of sacral region 01/31/2018     Priority: Medium     Bilateral low back pain with right-sided sciatica 01/31/2018     Priority: Medium     Segmental dysfunction of lumbar region 01/31/2018     Priority: Medium     Segmental  dysfunction of thoracic region 01/31/2018     Priority: Medium     Ganglion cyst of dorsum of right wrist 07/24/2017     Priority: Medium     Metacarpal boss 07/24/2017     Priority: Medium     Excessive or frequent menstruation 03/14/2017     Priority: Medium     Adjustment disorder with depressed mood 03/03/2017     Priority: Medium     Carpal tunnel syndrome of right wrist 10/18/2016     Priority: Medium     Irregular periods 12/08/2015     Priority: Medium     Pelvic pain in female 12/08/2015     Priority: Medium     S/P LEEP of cervix 01/23/2013     Priority: Medium     5/15/09 NIL pap  6/30/11 ASCUS pap, + HR HPV.   8/30/11 [place] bx: SUZAN 2, ECC SUZAN 1  3/27/12 [place] bx: SUZAN 1, ECC: neg.  Pap: NIL, + HR HPV  9/26/12 ASCUS pap, + HR HPV  11/7/12 [place] bx: SUZAN 2, ECC: SUZAN 1.   1/23/13 LEEP: SUZAN 2 with free margins.   6/25/13 NIL pap     ** above information is from Care Everywhere  **  12/8/15 NIL pap, neg HPV. Plan: cotest in 1 yr, per Staff message from Dr. Farnsworth dated 2/5/18.  4/13/18 NIL pap, neg HR HPV.Plan: cotest in 3 years.        Nose - slightly deviated septum to the left mildly enlarged inf turbs  Oral - Kelley tongue 2, 0+ tonsils, class 1 occlusion mild macroglossia  Impression/Plan:    The patient snores and is often a mouth breather. She denies allergies. Will start with nasal saline at pm.  Discuss sleep hygiene. Discuss smoking cessation.  She will follow up with me in about 3 month(s).     Twenty-five minutes spent with patient, all of which were spent face-to-face counseling, consulting, coordinating plan of care.      Ramses Chong MD      CC: Alize Ragland

## 2018-08-02 ENCOUNTER — OFFICE VISIT (OUTPATIENT)
Dept: FAMILY MEDICINE | Facility: CLINIC | Age: 28
End: 2018-08-02
Payer: COMMERCIAL

## 2018-08-02 ENCOUNTER — OFFICE VISIT (OUTPATIENT)
Dept: SLEEP MEDICINE | Facility: CLINIC | Age: 28
End: 2018-08-02
Payer: COMMERCIAL

## 2018-08-02 VITALS
RESPIRATION RATE: 22 BRPM | OXYGEN SATURATION: 97 % | SYSTOLIC BLOOD PRESSURE: 124 MMHG | HEIGHT: 64 IN | BODY MASS INDEX: 30.15 KG/M2 | DIASTOLIC BLOOD PRESSURE: 83 MMHG | TEMPERATURE: 98.3 F | WEIGHT: 176.6 LBS | HEART RATE: 82 BPM

## 2018-08-02 VITALS
WEIGHT: 177.5 LBS | HEART RATE: 108 BPM | RESPIRATION RATE: 22 BRPM | TEMPERATURE: 97.7 F | DIASTOLIC BLOOD PRESSURE: 78 MMHG | OXYGEN SATURATION: 97 % | BODY MASS INDEX: 30.95 KG/M2 | SYSTOLIC BLOOD PRESSURE: 126 MMHG

## 2018-08-02 DIAGNOSIS — R06.83 PRIMARY SNORING: Primary | ICD-10-CM

## 2018-08-02 DIAGNOSIS — E03.9 HYPOTHYROIDISM, UNSPECIFIED TYPE: ICD-10-CM

## 2018-08-02 DIAGNOSIS — R19.7 DIARRHEA, UNSPECIFIED TYPE: ICD-10-CM

## 2018-08-02 DIAGNOSIS — R10.84 ABDOMINAL PAIN, GENERALIZED: Primary | ICD-10-CM

## 2018-08-02 LAB
ALBUMIN SERPL-MCNC: 4.1 G/DL (ref 3.4–5)
ALP SERPL-CCNC: 74 U/L (ref 40–150)
ALT SERPL W P-5'-P-CCNC: 34 U/L (ref 0–50)
AMYLASE SERPL-CCNC: 31 U/L (ref 30–110)
ANION GAP SERPL CALCULATED.3IONS-SCNC: 7 MMOL/L (ref 3–14)
AST SERPL W P-5'-P-CCNC: 17 U/L (ref 0–45)
BASOPHILS # BLD AUTO: 0.1 10E9/L (ref 0–0.2)
BASOPHILS NFR BLD AUTO: 0.7 %
BILIRUB SERPL-MCNC: 0.2 MG/DL (ref 0.2–1.3)
BUN SERPL-MCNC: 13 MG/DL (ref 7–30)
CALCIUM SERPL-MCNC: 8.8 MG/DL (ref 8.5–10.1)
CHLORIDE SERPL-SCNC: 107 MMOL/L (ref 94–109)
CO2 SERPL-SCNC: 26 MMOL/L (ref 20–32)
CREAT SERPL-MCNC: 0.87 MG/DL (ref 0.52–1.04)
DIFFERENTIAL METHOD BLD: NORMAL
EOSINOPHIL NFR BLD AUTO: 2.4 %
ERYTHROCYTE [DISTWIDTH] IN BLOOD BY AUTOMATED COUNT: 12.6 % (ref 10–15)
GFR SERPL CREATININE-BSD FRML MDRD: 77 ML/MIN/1.7M2
GLUCOSE SERPL-MCNC: 87 MG/DL (ref 70–99)
HCT VFR BLD AUTO: 42.6 % (ref 35–47)
HGB BLD-MCNC: 14.4 G/DL (ref 11.7–15.7)
IMM GRANULOCYTES # BLD: 0.1 10E9/L (ref 0–0.4)
IMM GRANULOCYTES NFR BLD: 1 %
LIPASE SERPL-CCNC: 78 U/L (ref 73–393)
LYMPHOCYTES # BLD AUTO: 3.2 10E9/L (ref 0.8–5.3)
LYMPHOCYTES NFR BLD AUTO: 32 %
MCH RBC QN AUTO: 30.7 PG (ref 26.5–33)
MCHC RBC AUTO-ENTMCNC: 33.8 G/DL (ref 31.5–36.5)
MCV RBC AUTO: 91 FL (ref 78–100)
MONOCYTES # BLD AUTO: 0.5 10E9/L (ref 0–1.3)
MONOCYTES NFR BLD AUTO: 5 %
NEUTROPHILS # BLD AUTO: 5.9 10E9/L (ref 1.6–8.3)
NEUTROPHILS NFR BLD AUTO: 58.9 %
NRBC # BLD AUTO: 0 10*3/UL
NRBC BLD AUTO-RTO: 0 /100
PLATELET # BLD AUTO: 331 10E9/L (ref 150–450)
POTASSIUM SERPL-SCNC: 3.9 MMOL/L (ref 3.4–5.3)
PROT SERPL-MCNC: 7.8 G/DL (ref 6.8–8.8)
RBC # BLD AUTO: 4.69 10E12/L (ref 3.8–5.2)
SODIUM SERPL-SCNC: 140 MMOL/L (ref 133–144)
WBC # BLD AUTO: 10 10E9/L (ref 4–11)

## 2018-08-02 PROCEDURE — 85025 COMPLETE CBC W/AUTO DIFF WBC: CPT | Performed by: NURSE PRACTITIONER

## 2018-08-02 PROCEDURE — 80053 COMPREHEN METABOLIC PANEL: CPT | Performed by: NURSE PRACTITIONER

## 2018-08-02 PROCEDURE — 99213 OFFICE O/P EST LOW 20 MIN: CPT | Performed by: OTOLARYNGOLOGY

## 2018-08-02 PROCEDURE — 83690 ASSAY OF LIPASE: CPT | Performed by: NURSE PRACTITIONER

## 2018-08-02 PROCEDURE — 36415 COLL VENOUS BLD VENIPUNCTURE: CPT | Performed by: NURSE PRACTITIONER

## 2018-08-02 PROCEDURE — 82150 ASSAY OF AMYLASE: CPT | Performed by: NURSE PRACTITIONER

## 2018-08-02 PROCEDURE — 99214 OFFICE O/P EST MOD 30 MIN: CPT | Performed by: NURSE PRACTITIONER

## 2018-08-02 RX ORDER — LEVOTHYROXINE SODIUM 88 UG/1
88 TABLET ORAL DAILY
Qty: 90 TABLET | Refills: 1 | Status: SHIPPED | OUTPATIENT
Start: 2018-08-02 | End: 2019-02-02

## 2018-08-02 NOTE — LETTER
[address]   [address]  Tel. [phone]/ Fax [phone]    December 5, 2018        Leah Cox  [address] [place] [id number]          Dear Ms. Leah Cox:    Your previous orders for lab work have been extended.  Please call to schedule a lab appointment and return to the clinic to have the labs drawn at your earliest convenience.    If you are required to be fasting for these tests,  remember to have nothing by mouth (except water) after midnight on the night before your test.    If you have any questions or concerns, please contact our office.    Sincerely,       Alize Ragland, NP care team

## 2018-08-02 NOTE — PROGRESS NOTES
SUBJECTIVE:   Leah Cox is a 27 year old female who presents to clinic today for the following health issues:       ED/UC Followup:    Facility:  [facility]  Date of visit: 7/23/18  Reason for visit: diarrhea  Current Status: doing better, did have another episode of diarrhea, stomach cramps/bloating comes and goes     The patient is seen in clinic today with report of diarrhea for 2 months.  She was seen in the ED 7/23 for persistent diarrhea.  Labs at that time were normal with the exception of a slightly elevated white count at 14.0.  Stool samples were collected and those cultures have returned normal, also negative for ova and parasite.  She denies having fever, but states at times she does have cold sweats.  She has been nauseous without vomiting.  She denies passing blood in her stools.  She states they are watery, and appeared to have an oily film on top.  Lab work performed in the ED also revealed elevated TSH.  She is presently on levothyroxine 75 mcg daily        Problem list and histories reviewed & adjusted, as indicated.  Additional history: as documented    BP Readings from Last 3 Encounters:   08/02/18 126/78   07/23/18 125/89   06/21/18 120/76    Wt Readings from Last 3 Encounters:   08/02/18 177 lb 8 oz (80.5 kg)   07/23/18 180 lb 8 oz (81.9 kg)   06/21/18 179 lb (81.2 kg)                  Past Medical History:   Diagnosis Date     ASCUS with positive high risk HPV cervical 09/26/2012     H/O colposcopy with cervical biopsy 11/07/2012    SUZAN 2-3     S/P LEEP of cervix 01/23/2013    SUZAN 2 with free margins     Past Surgical History:   Procedure Laterality Date     LEEP TX, CERVICAL  01/23/2013    SUZAN 2 with free margins - Care Everywhere.     RELEASE CARPAL TUNNEL Right 10/5/2016    Procedure: RELEASE CARPAL TUNNEL;  Surgeon: Christian Sebastian MD;  Location: [facility] OR     Patient Active Problem List   Diagnosis     Irregular periods     Pelvic pain in female     Carpal tunnel syndrome of right  wrist     Adjustment disorder with depressed mood     Excessive or frequent menstruation     Ganglion cyst of dorsum of right wrist     Metacarpal boss     Segmental dysfunction of sacral region     Bilateral low back pain with right-sided sciatica     Segmental dysfunction of lumbar region     Segmental dysfunction of thoracic region     S/P LEEP of cervix     Tobacco use disorder     Major depressive disorder, recurrent episode, moderate (H)     RENAY (generalized anxiety disorder)     Diarrhea, unspecified type     Abdominal pain, generalized     Hypothyroidism, unspecified type       ROS:  Constitutional, HEENT, cardiovascular, pulmonary, gi and gu systems are negative, except as otherwise noted.    OBJECTIVE:     /78  Pulse 108  Temp 97.7  F (36.5  C) (Temporal)  Resp 22  Wt 177 lb 8 oz (80.5 kg)  LMP 07/17/2018 (Approximate)  SpO2 97%  BMI 30.95 kg/m2  Body mass index is 30.95 kg/(m^2).   GENERAL: healthy, alert and no distress  NECK: no adenopathy, no asymmetry, masses, or scars and thyroid normal to palpation  RESP: lungs clear to auscultation - no rales, rhonchi or wheezes  CV: regular rate and rhythm, normal S1 S2, no S3 or S4, no murmur, click or rub, no peripheral edema and peripheral pulses strong  ABDOMEN: Soft, nondistended.  Bowel sounds present in all quadrants.  No organomegaly is palpated.  No masses noted.  She has mild tenderness with palpation in the right upper quadrant, was very tender with palpation in the right lower quadrant.  MS: no gross musculoskeletal defects noted, no edema    Diagnostic Test Results:  Results for orders placed or performed in visit on 08/02/18 (from the past 24 hour(s))   Comprehensive metabolic panel   Result Value Ref Range    Sodium 140 133 - 144 mmol/L    Potassium 3.9 3.4 - 5.3 mmol/L    Chloride 107 94 - 109 mmol/L    Carbon Dioxide 26 20 - 32 mmol/L    Anion Gap 7 3 - 14 mmol/L    Glucose 87 70 - 99 mg/dL    Urea Nitrogen 13 7 - 30 mg/dL     Creatinine 0.87 0.52 - 1.04 mg/dL    GFR Estimate 77 >60 mL/min/1.7m2    GFR Estimate If Black >90 >60 mL/min/1.7m2    Calcium 8.8 8.5 - 10.1 mg/dL    Bilirubin Total 0.2 0.2 - 1.3 mg/dL    Albumin 4.1 3.4 - 5.0 g/dL    Protein Total 7.8 6.8 - 8.8 g/dL    Alkaline Phosphatase 74 40 - 150 U/L    ALT 34 0 - 50 U/L    AST 17 0 - 45 U/L   CBC with platelets and differential   Result Value Ref Range    WBC 10.0 4.0 - 11.0 10e9/L    RBC Count 4.69 3.8 - 5.2 10e12/L    Hemoglobin 14.4 11.7 - 15.7 g/dL    Hematocrit 42.6 35.0 - 47.0 %    MCV 91 78 - 100 fl    MCH 30.7 26.5 - 33.0 pg    MCHC 33.8 31.5 - 36.5 g/dL    RDW 12.6 10.0 - 15.0 %    Platelet Count 331 150 - 450 10e9/L    Diff Method Automated Method     % Neutrophils 58.9 %    % Lymphocytes 32.0 %    % Monocytes 5.0 %    % Eosinophils 2.4 %    % Basophils 0.7 %    % Immature Granulocytes 1.0 %    Nucleated RBCs 0 0 /100    Absolute Neutrophil 5.9 1.6 - 8.3 10e9/L    Absolute Lymphocytes 3.2 0.8 - 5.3 10e9/L    Absolute Monocytes 0.5 0.0 - 1.3 10e9/L    Absolute Basophils 0.1 0.0 - 0.2 10e9/L    Abs Immature Granulocytes 0.1 0 - 0.4 10e9/L    Absolute Nucleated RBC 0.0    Lipase   Result Value Ref Range    Lipase 78 73 - 393 U/L   Amylase   Result Value Ref Range    Amylase 31 30 - 110 U/L       ASSESSMENT/PLAN:     Problem List Items Addressed This Visit        Medium    Diarrhea, unspecified type    Relevant Orders    Comprehensive metabolic panel (Completed)    CBC with platelets and differential (Completed)    Lipase (Completed)    Amylase (Completed)    Abdominal pain, generalized - Primary    Relevant Orders    Comprehensive metabolic panel (Completed)    CBC with platelets and differential (Completed)    Lipase (Completed)    Amylase (Completed)    US Abdomen Complete    Hypothyroidism, unspecified type    Relevant Medications    levothyroxine (SYNTHROID/LEVOTHROID) 88 MCG tablet           Lab work is all returned normal, white count today is 10.0.  Scheduled  for an abdominal ultrasound, further follow-up pending that result    Levothyroxine has been increased to 88 mcg daily, recheck TSH in 2 months    NADJA Pierce CNP  [facility]

## 2018-08-02 NOTE — MR AVS SNAPSHOT
After Visit Summary   8/2/2018    Leah Cox    MRN: [id number]           Patient Information     Date Of Birth          1990        Visit Information        Provider Department      8/2/2018 1:45 PM Ramses Chong MD [facility]        Today's Diagnoses     Primary snoring    -  1       Follow-ups after your visit        Your next 10 appointments already scheduled     Aug 16, 2018  3:15 PM CDT   US ABDOMEN COMPLETE with PHUS1   [facility] ([facility])    [address] [place] [id number] [phone]           Please bring a list of your medicines (including vitamins, minerals and over-the-counter drugs). Also, tell your doctor about any allergies you may have. Wear comfortable clothes and leave your valuables at home.  Adults: No eating or drinking for 8 hours before the exam. You may take medicine with a small sip of water.  Children: - Infants, breast-fed: may have breast milk up to 2 hours before exam. - Infants, formula: may have bottle until 4 hours before exam. - Children 1-5 years: No food or drink for 4 hours before exam. - Children 6 -12 years: No food or drink for 6 hours before exam. - Children over 12 years: No food or drink for 8 hours before exam. - J Tube Fed: No need to stop feedings.  Please call the Imaging Department at your exam site with any questions.              Future tests that were ordered for you today     Open Future Orders        Priority Expected Expires Ordered    **TSH with free T4 reflex FUTURE 2mo Routine 10/1/2018 11/30/2018 8/2/2018    US Abdomen Complete Routine  8/2/2019 8/2/2018            Who to contact     If you have questions or need follow up information about today's clinic visit or your schedule please contact [facility] directly at [phone].  Normal or non-critical lab and imaging results will be communicated to you by MyChart, letter or phone  "within 4 business days after the clinic has received the results. If you do not hear from us within 7 days, please contact the clinic through Pop[employer] or phone. If you have a critical or abnormal lab result, we will notify you by phone as soon as possible.  Submit refill requests through Your [employer] or call your pharmacy and they will forward the refill request to us. Please allow 3 business days for your refill to be completed.          Additional Information About Your Visit        Care EveryWhere ID     This is your Care EveryWhere ID. This could be used by other organizations to access your [facility] medical records  [id number]        Your Vitals Were     Pulse Temperature Respirations Height Last Period Pulse Oximetry    82 98.3  F (36.8  C) (Temporal) 22 1.613 m (5' 3.5\") 07/17/2018 (Approximate) 97%    BMI (Body Mass Index)                   30.79 kg/m2            Blood Pressure from Last 3 Encounters:   08/02/18 124/83   08/02/18 126/78   07/23/18 125/89    Weight from Last 3 Encounters:   08/02/18 80.1 kg (176 lb 9.6 oz)   08/02/18 80.5 kg (177 lb 8 oz)   07/23/18 81.9 kg (180 lb 8 oz)              Today, you had the following     No orders found for display         Today's Medication Changes          These changes are accurate as of 8/2/18  2:11 PM.  If you have any questions, ask your nurse or doctor.               These medicines have changed or have updated prescriptions.        Dose/Directions    levothyroxine 88 MCG tablet   Commonly known as:  SYNTHROID/LEVOTHROID   This may have changed:    - medication strength  - how much to take   Used for:  Hypothyroidism, unspecified type   Changed by:  Alize Ragland APRN CNP        Dose:  88 mcg   Take 1 tablet (88 mcg) by mouth daily   Quantity:  90 tablet   Refills:  1            Where to get your medicines      These medications were sent to [facility]  [address]  [address], [place] [address]     Phone:  " [phone]     levothyroxine 88 MCG tablet                Primary Care Provider Office Phone # Fax #    Alize Ragland, APRN [facility] [phone]       [address] [place] [address]        Equal Access to Services     CONCHIS SALCIDO : Hadii ketan arora hadshivo Soomaali, waaxda luqadaha, qaybta kaalmada adeegyada, waxwero idiin hayadilsonn azael giulialeanna finley. So [facility] [phone].    ATENCIÓN: Si habla español, tiene a varghese disposición servicios gratuitos de asistencia lingüística. Llame al [phone].    We comply with applicable federal civil rights laws and [place] laws. We do not discriminate on the basis of race, color, national origin, age, disability, sex, sexual orientation, or gender identity.            Thank you!     Thank you for choosing [facility]  for your care. Our goal is always to provide you with excellent care. Hearing back from our patients is one way we can continue to improve our services. Please take a few minutes to complete the written survey that you may receive in the mail after your visit with us. Thank you!             Your Updated Medication List - Protect others around you: Learn how to safely use, store and throw away your medicines at www.disposemymeds.org.          This list is accurate as of 8/2/18  2:11 PM.  Always use your most recent med list.                   Brand Name Dispense Instructions for use Diagnosis    levothyroxine 88 MCG tablet    SYNTHROID/LEVOTHROID    90 tablet    Take 1 tablet (88 mcg) by mouth daily    Hypothyroidism, unspecified type       sertraline 100 MG tablet    ZOLOFT    90 tablet    Take 1 tablet (100 mg) by mouth daily    Adjustment disorder with depressed mood

## 2018-08-02 NOTE — MR AVS SNAPSHOT
After Visit Summary   8/2/2018    Leah Cox    MRN: [id number]           Patient Information     Date Of Birth          1990        Visit Information        Provider Department      8/2/2018 11:00 AM Alize Ragland APRN [facility]        Today's Diagnoses     Abdominal pain, generalized    -  1    Diarrhea, unspecified type        Hypothyroidism, unspecified type           Follow-ups after your visit        Your next 10 appointments already scheduled     Aug 02, 2018  1:45 PM CDT   Return Sleep Patient with Ramses Chong MD   [facility] ([facility])    [address] [place] [id number] [phone]            Aug 16, 2018  3:15 PM CDT   US ABDOMEN COMPLETE with PHUS1   [facility]ound ([facility])    [address] [place] [id number] [phone]           Please bring a list of your medicines (including vitamins, minerals and over-the-counter drugs). Also, tell your doctor about any allergies you may have. Wear comfortable clothes and leave your valuables at home.  Adults: No eating or drinking for 8 hours before the exam. You may take medicine with a small sip of water.  Children: - Infants, breast-fed: may have breast milk up to 2 hours before exam. - Infants, formula: may have bottle until 4 hours before exam. - Children 1-5 years: No food or drink for 4 hours before exam. - Children 6 -12 years: No food or drink for 6 hours before exam. - Children over 12 years: No food or drink for 8 hours before exam. - J Tube Fed: No need to stop feedings.  Please call the Imaging Department at your exam site with any questions.              Future tests that were ordered for you today     Open Future Orders        Priority Expected Expires Ordered    US Abdomen Complete Routine  8/2/2019 8/2/2018            Who to contact     If you have questions or need follow up  information about today's clinic visit or your schedule please contact [facility] directly at [phone].  Normal or non-critical lab and imaging results will be communicated to you by MyChart, letter or phone within 4 business days after the clinic has received the results. If you do not hear from us within 7 days, please contact the clinic through MyChart or phone. If you have a critical or abnormal lab result, we will notify you by phone as soon as possible.  Submit refill requests through [employer] or call your pharmacy and they will forward the refill request to us. Please allow 3 business days for your refill to be completed.          Additional Information About Your Visit        Care EveryWhere ID     This is your Care EveryWhere ID. This could be used by other organizations to access your [facility] medical records  [id number]        Your Vitals Were     Pulse Temperature Respirations Last Period Pulse Oximetry BMI (Body Mass Index)    108 97.7  F (36.5  C) (Temporal) 22 07/17/2018 (Approximate) 97% 30.95 kg/m2       Blood Pressure from Last 3 Encounters:   08/02/18 126/78   07/23/18 125/89   06/21/18 120/76    Weight from Last 3 Encounters:   08/02/18 177 lb 8 oz (80.5 kg)   07/23/18 180 lb 8 oz (81.9 kg)   06/21/18 179 lb (81.2 kg)              We Performed the Following     Amylase     CBC with platelets and differential     Comprehensive metabolic panel     Lipase          Today's Medication Changes          These changes are accurate as of 8/2/18 11:42 AM.  If you have any questions, ask your nurse or doctor.               These medicines have changed or have updated prescriptions.        Dose/Directions    levothyroxine 88 MCG tablet   Commonly known as:  SYNTHROID/LEVOTHROID   This may have changed:    - medication strength  - how much to take   Used for:  Hypothyroidism, unspecified type   Changed by:  Alize Ragland APRN CNP        Dose:  88 mcg   Take 1 tablet (88 mcg) by  mouth daily   Quantity:  90 tablet   Refills:  1            Where to get your medicines      These medications were sent to [facility], [place] - [address]  [address] [facility] [address]     Phone:  [phone]     levothyroxine 88 MCG tablet                Primary Care Provider Office Phone # Fax #    Alize Ragland, NADJA [facility] [phone] [address] [facility] [place]   [facility] [address]        Equal Access to Services     LUCIE SALCIDO : Hadii aad ku hadasho Soomaali, waaxda luqadaha, qaybta kaalmada adeegyada, waxay idiin hayaan adeeg kharash lasocorro . So [facility] [phone].    ATENCIÓN: Si habla español, tiene a varghese disposición servicios gratuitos de asistencia lingüística. [facility] [phone].    We comply with applicable federal civil rights laws and [place] laws. We do not discriminate on the basis of race, color, national origin, age, disability, sex, sexual orientation, or gender identity.            Thank you!     Thank you for choosing [facility]  for your care. Our goal is always to provide you with excellent care. Hearing back from our patients is one way we can continue to improve our services. Please take a few minutes to complete the written survey that you may receive in the mail after your visit with us. Thank you!             Your Updated Medication List - Protect others around you: Learn how to safely use, store and throw away your medicines at www.disposemymeds.org.          This list is accurate as of 8/2/18 11:42 AM.  Always use your most recent med list.                   Brand Name Dispense Instructions for use Diagnosis    levothyroxine 88 MCG tablet    SYNTHROID/LEVOTHROID    90 tablet    Take 1 tablet (88 mcg) by mouth daily    Hypothyroidism, unspecified type       sertraline 100 MG tablet    ZOLOFT    90 tablet    Take 1 tablet (100 mg) by mouth daily    Adjustment disorder with depressed mood

## 2018-08-14 NOTE — ADDENDUM NOTE
Encounter addended by: Mirella Peña, PT on: 8/14/2018 12:12 PM<BR>     Actions taken: Sign clinical note, Episode resolved

## 2018-08-14 NOTE — PROGRESS NOTES
Outpatient Physical Therapy Discharge Note     Patient: Leah Cox  : 1990    Beginning/End Dates of Reporting Period:  10/13/17 to 10/13/17    Referring Provider: Dr. Mirella Jaimes    Therapy Diagnosis: Pain in R hip/pelvis and low back , decreased WB R  decreased strength R LE and core,  decreased flexibility consistent with LBP with radicular sx.      Client Self Report: Pt reports that she has had LBP x approximately 2 years (hips tend to be sore all the time). It has gotten worse and she now gets pain and numbness down her R lateral thigh to mid thigh.  She works at [employer] as a  and has been put on light duty. She notes that her sleep is affected and she can not sleep for more than 6 hours although the flexoril that Dr. Jaimes prescribed has helped that some.  She reports she sleeps on her belly with her L knee drawn up.     Plan:  Discharge from therapy.  Other: Per initial evaluation    Discharge:    Reason for Discharge: Patient chooses to discontinue therapy.  Patient has failed to schedule further appointments.    Equipment Issued: none    Discharge Plan: Patient to continue home program.

## 2018-08-17 ENCOUNTER — TELEPHONE (OUTPATIENT)
Dept: FAMILY MEDICINE | Facility: CLINIC | Age: 28
End: 2018-08-17

## 2018-08-17 ENCOUNTER — HOSPITAL ENCOUNTER (OUTPATIENT)
Dept: ULTRASOUND IMAGING | Facility: CLINIC | Age: 28
Discharge: HOME | End: 2018-08-17
Attending: NURSE PRACTITIONER | Admitting: NURSE PRACTITIONER
Payer: COMMERCIAL

## 2018-08-17 DIAGNOSIS — R10.84 ABDOMINAL PAIN, GENERALIZED: ICD-10-CM

## 2018-08-17 PROCEDURE — 76700 US EXAM ABDOM COMPLETE: CPT

## 2018-08-17 NOTE — TELEPHONE ENCOUNTER
Patient called back and message was relayed no further action is needed as of right now.     Rhonda Dillon, CMA

## 2018-08-17 NOTE — TELEPHONE ENCOUNTER
Called patient and was unable to leave a voicemail.  Please relay message below if patient calls back.     Rhonda Dillon, ADELFO

## 2018-08-17 NOTE — TELEPHONE ENCOUNTER
----- Message from NADJA Pierce CNP sent at 8/17/2018 12:50 PM CDT -----  Abdominal ultrasound is normal.  I would try eliminating gluten and processed foods.  If continuing to have issues with abdominal pain and diarrhea, will consider referral to gastroenterology since all testing so far has been negative

## 2018-09-19 ENCOUNTER — TELEPHONE (OUTPATIENT)
Dept: FAMILY MEDICINE | Facility: CLINIC | Age: 28
End: 2018-09-19

## 2018-09-19 NOTE — TELEPHONE ENCOUNTER
"RN TRIAGE CALL:    Patient Contact    Attempt # 1    Was call answered?  Yes.  \"May I please speak with <patient name>\"  Is patient available?   Yes    CALL WAS DISCONNECTED in the first couple minutes of phone call.  RN called back and received vm.    Namrata Ni RN          "

## 2018-09-19 NOTE — TELEPHONE ENCOUNTER
Reason for call:  Patient reporting a symptom    Symptom or request: diarrhea/sweaty/tired    Duration (how long have symptoms been present): on and off. Feeling worse today    Have you been treated for this before? Yes    Additional comments: pt seen [facility] last month    Phone Number patient can be reached at:  Home number on file [phone] (home)    Best Time:      Can we leave a detailed message on this number:  YES    Call taken on 9/19/2018 at 2:50 PM by Inessa Mckenzie

## 2018-09-19 NOTE — TELEPHONE ENCOUNTER
"Leah Cox is a 27 year old female who calls with concerns of continued diarrhea.     NURSING ASSESSMENT:  Description:  Ongoing diarrhea.had 4 episodes today. More of a \"shredded\" bowel substance.    Onset/duration:  Over a month  Precip. factors:  Associated symptoms:  Hot flashes today and nauseated. Worried that it is anxiety that is bringing on the issues.  Improves/worsens symptoms:  none  Pain scale (0-10)   0/10  LMP/preg/breast feeding:     Last exam/Treatment:  8/2/2018  Allergies:   Allergies   Allergen Reactions     Cyclobenzaprine      Started giving her dreams         RECOMMENDED DISPOSITION:  See within 2 weeks - appt made  Will comply with recommendation: Yes  If further questions/concerns or if symptoms do not improve, worsen or new symptoms develop, call your PCP or [facility] as soon as possible.      Guideline used:  Telephone Triage Protocols for Nurses, Fifth Edition, Jane Ni RN    "

## 2018-10-15 ENCOUNTER — TELEPHONE (OUTPATIENT)
Dept: FAMILY MEDICINE | Facility: CLINIC | Age: 28
End: 2018-10-15

## 2018-10-15 DIAGNOSIS — R30.0 DYSURIA: Primary | ICD-10-CM

## 2018-10-15 NOTE — TELEPHONE ENCOUNTER
Reason for Call:  Same Day Appointment, Requested Provider:  Alize Ragland NP    PCP: Alize Ragland    Reason for visit: GI, bladder & anxiety issues    Duration of symptoms: 1 week    Have you been treated for this in the past? No    Additional comments:     Can we leave a detailed message on this number? YES    Phone number patient can be reached at: Home number on file [phone] (home)    Best Time:     Call taken on 10/15/2018 at 11:48 AM by Tabatha Joshi

## 2018-10-16 NOTE — TELEPHONE ENCOUNTER
Tried to call patient mailbox is full. Im unable to LM. Patient ok to be seen to day at 415. Patient must come 20 minutes early to go to lab to leave UA. Please inform her of this in the event she calls back to inquire on message. Patient will be put on schedule at 415 today. Devante/MA

## 2018-10-30 ENCOUNTER — TELEPHONE (OUTPATIENT)
Dept: FAMILY MEDICINE | Facility: CLINIC | Age: 28
End: 2018-10-30

## 2018-10-30 NOTE — TELEPHONE ENCOUNTER
Reason for Call:  Other prescription    Detailed comments: [place] calls to state she is out of her Zoloft for about a week now and does not want to continue to take it.  Virginia is asking if there is something different that is more relaxing and can calm her nerves.  Virginia states she is still isnt coming out her basement or wanting to talk with anyone.    [facility]    Phone Number Patient can be reached at: Home number on file [phone] (home)    Best Time: any    Can we leave a detailed message on this number? YES    Call taken on 10/30/2018 at 9:23 AM by Gia Alford

## 2018-10-30 NOTE — TELEPHONE ENCOUNTER
EUGENIA on id vm for patient to return call. Per provider, patient needs to be seen to discuss medications. Please assist in scheduling when she calls back. Ok to use Dr Roberts. Devante/MA

## 2018-11-02 ENCOUNTER — NURSE TRIAGE (OUTPATIENT)
Dept: NURSING | Facility: CLINIC | Age: 28
End: 2018-11-02

## 2018-11-02 ENCOUNTER — TELEPHONE (OUTPATIENT)
Dept: FAMILY MEDICINE | Facility: CLINIC | Age: 28
End: 2018-11-02

## 2018-11-02 DIAGNOSIS — R19.7 DIARRHEA, UNSPECIFIED TYPE: Primary | ICD-10-CM

## 2018-11-02 DIAGNOSIS — R14.0 BLOATING: ICD-10-CM

## 2018-11-02 DIAGNOSIS — R10.84 ABDOMINAL PAIN, GENERALIZED: ICD-10-CM

## 2018-11-02 NOTE — TELEPHONE ENCOUNTER
Virginia had an appointment with a gastroenterologist. She cancelled the appointment.    She is having bloating and pain now and wants to see a GI provider asap. She wants to know where she had previously been referred. She is hoping she can be seen today.    [phone] - She stated it is okay to leave a detailed message on her voicemail if she doesn't answer.    Routed:P 55645 -  Alize EASTON RN [facility]

## 2018-11-02 NOTE — TELEPHONE ENCOUNTER
Virginia had an appointment with a gastroenterologist. She cancelled the appointment.    She is having bloating and pain now and wants to see a GI provider asap. She wants to know where she had previously been referred.    [phone] - She stated it is okay to leave a detailed message on her voicemail if she doesn't answer.    Routed:P 87805 -  Alize EASTON, RN [facility]

## 2018-11-07 ENCOUNTER — HOSPITAL ENCOUNTER (EMERGENCY)
Facility: CLINIC | Age: 28
Discharge: HOME | End: 2018-11-07
Attending: NURSE PRACTITIONER | Admitting: NURSE PRACTITIONER
Payer: COMMERCIAL

## 2018-11-07 ENCOUNTER — APPOINTMENT (OUTPATIENT)
Dept: GENERAL RADIOLOGY | Facility: CLINIC | Age: 28
End: 2018-11-07
Attending: NURSE PRACTITIONER
Payer: COMMERCIAL

## 2018-11-07 ENCOUNTER — TELEPHONE (OUTPATIENT)
Dept: FAMILY MEDICINE | Facility: CLINIC | Age: 28
End: 2018-11-07

## 2018-11-07 VITALS
OXYGEN SATURATION: 99 % | SYSTOLIC BLOOD PRESSURE: 124 MMHG | DIASTOLIC BLOOD PRESSURE: 100 MMHG | BODY MASS INDEX: 32.78 KG/M2 | HEART RATE: 104 BPM | TEMPERATURE: 98.2 F | HEIGHT: 63 IN | WEIGHT: 185 LBS | RESPIRATION RATE: 18 BRPM

## 2018-11-07 DIAGNOSIS — R14.0 ABDOMINAL BLOATING: ICD-10-CM

## 2018-11-07 DIAGNOSIS — R07.89 CHEST WALL PAIN: ICD-10-CM

## 2018-11-07 DIAGNOSIS — R00.0 TACHYCARDIA, UNSPECIFIED: ICD-10-CM

## 2018-11-07 LAB
ALBUMIN SERPL-MCNC: 3.9 G/DL (ref 3.4–5)
ALBUMIN UR-MCNC: NEGATIVE MG/DL
ALP SERPL-CCNC: 62 U/L (ref 40–150)
ALT SERPL W P-5'-P-CCNC: 34 U/L (ref 0–50)
ANION GAP SERPL CALCULATED.3IONS-SCNC: 8 MMOL/L (ref 3–14)
APPEARANCE UR: CLEAR
AST SERPL W P-5'-P-CCNC: 17 U/L (ref 0–45)
BACTERIA #/AREA URNS HPF: ABNORMAL /HPF
BASOPHILS # BLD AUTO: 0.1 10E9/L (ref 0–0.2)
BASOPHILS NFR BLD AUTO: 0.5 %
BILIRUB SERPL-MCNC: 0.5 MG/DL (ref 0.2–1.3)
BILIRUB UR QL STRIP: NEGATIVE
BUN SERPL-MCNC: 13 MG/DL (ref 7–30)
CALCIUM SERPL-MCNC: 8.7 MG/DL (ref 8.5–10.1)
CHLORIDE SERPL-SCNC: 103 MMOL/L (ref 94–109)
CO2 SERPL-SCNC: 29 MMOL/L (ref 20–32)
COLOR UR AUTO: ABNORMAL
CREAT SERPL-MCNC: 0.77 MG/DL (ref 0.52–1.04)
CRP SERPL-MCNC: 3 MG/L (ref 0–8)
D DIMER PPP FEU-MCNC: 0.3 UG/ML FEU (ref 0–0.5)
DIFFERENTIAL METHOD BLD: ABNORMAL
EOSINOPHIL NFR BLD AUTO: 0.6 %
ERYTHROCYTE [DISTWIDTH] IN BLOOD BY AUTOMATED COUNT: 12.6 % (ref 10–15)
GFR SERPL CREATININE-BSD FRML MDRD: 89 ML/MIN/1.7M2
GLUCOSE SERPL-MCNC: 78 MG/DL (ref 70–99)
GLUCOSE UR STRIP-MCNC: NEGATIVE MG/DL
HCG UR QL: NEGATIVE
HCT VFR BLD AUTO: 41.9 % (ref 35–47)
HGB BLD-MCNC: 14.2 G/DL (ref 11.7–15.7)
HGB UR QL STRIP: ABNORMAL
IMM GRANULOCYTES # BLD: 0.1 10E9/L (ref 0–0.4)
IMM GRANULOCYTES NFR BLD: 0.7 %
KETONES UR STRIP-MCNC: NEGATIVE MG/DL
LEUKOCYTE ESTERASE UR QL STRIP: NEGATIVE
LYMPHOCYTES # BLD AUTO: 1.4 10E9/L (ref 0.8–5.3)
LYMPHOCYTES NFR BLD AUTO: 11.1 %
MCH RBC QN AUTO: 31.2 PG (ref 26.5–33)
MCHC RBC AUTO-ENTMCNC: 33.9 G/DL (ref 31.5–36.5)
MCV RBC AUTO: 92 FL (ref 78–100)
MONOCYTES # BLD AUTO: 0.5 10E9/L (ref 0–1.3)
MONOCYTES NFR BLD AUTO: 3.9 %
MUCOUS THREADS #/AREA URNS LPF: PRESENT /LPF
NEUTROPHILS # BLD AUTO: 10.3 10E9/L (ref 1.6–8.3)
NEUTROPHILS NFR BLD AUTO: 83.2 %
NITRATE UR QL: NEGATIVE
NRBC # BLD AUTO: 0 10*3/UL
NRBC BLD AUTO-RTO: 0 /100
PH UR STRIP: 7 PH (ref 5–7)
PLATELET # BLD AUTO: 244 10E9/L (ref 150–450)
POTASSIUM SERPL-SCNC: 3.7 MMOL/L (ref 3.4–5.3)
PROT SERPL-MCNC: 7.4 G/DL (ref 6.8–8.8)
RBC # BLD AUTO: 4.55 10E12/L (ref 3.8–5.2)
RBC #/AREA URNS AUTO: 4 /HPF (ref 0–2)
SODIUM SERPL-SCNC: 140 MMOL/L (ref 133–144)
SOURCE: ABNORMAL
SP GR UR STRIP: 1.01 (ref 1–1.03)
SQUAMOUS #/AREA URNS AUTO: 3 /HPF (ref 0–1)
TSH SERPL DL<=0.005 MIU/L-ACNC: 2.52 MU/L (ref 0.4–4)
UROBILINOGEN UR STRIP-MCNC: 0 MG/DL (ref 0–2)
WBC # BLD AUTO: 12.4 10E9/L (ref 4–11)
WBC #/AREA URNS AUTO: <1 /HPF (ref 0–5)

## 2018-11-07 PROCEDURE — 86140 C-REACTIVE PROTEIN: CPT | Performed by: NURSE PRACTITIONER

## 2018-11-07 PROCEDURE — 81025 URINE PREGNANCY TEST: CPT | Performed by: NURSE PRACTITIONER

## 2018-11-07 PROCEDURE — 96374 THER/PROPH/DIAG INJ IV PUSH: CPT | Performed by: NURSE PRACTITIONER

## 2018-11-07 PROCEDURE — 99284 EMERGENCY DEPT VISIT MOD MDM: CPT | Mod: 25 | Performed by: NURSE PRACTITIONER

## 2018-11-07 PROCEDURE — 25000128 H RX IP 250 OP 636: Performed by: NURSE PRACTITIONER

## 2018-11-07 PROCEDURE — 80053 COMPREHEN METABOLIC PANEL: CPT | Performed by: NURSE PRACTITIONER

## 2018-11-07 PROCEDURE — 96375 TX/PRO/DX INJ NEW DRUG ADDON: CPT | Performed by: NURSE PRACTITIONER

## 2018-11-07 PROCEDURE — 96361 HYDRATE IV INFUSION ADD-ON: CPT | Performed by: NURSE PRACTITIONER

## 2018-11-07 PROCEDURE — 99284 EMERGENCY DEPT VISIT MOD MDM: CPT | Mod: Z6 | Performed by: NURSE PRACTITIONER

## 2018-11-07 PROCEDURE — 84443 ASSAY THYROID STIM HORMONE: CPT | Performed by: NURSE PRACTITIONER

## 2018-11-07 PROCEDURE — 85379 FIBRIN DEGRADATION QUANT: CPT | Performed by: NURSE PRACTITIONER

## 2018-11-07 PROCEDURE — 85025 COMPLETE CBC W/AUTO DIFF WBC: CPT | Performed by: NURSE PRACTITIONER

## 2018-11-07 PROCEDURE — 81001 URINALYSIS AUTO W/SCOPE: CPT | Performed by: NURSE PRACTITIONER

## 2018-11-07 PROCEDURE — 71046 X-RAY EXAM CHEST 2 VIEWS: CPT | Mod: TC

## 2018-11-07 RX ORDER — NAPROXEN 500 MG/1
500 TABLET ORAL 2 TIMES DAILY WITH MEALS
Qty: 16 TABLET | Refills: 0 | Status: SHIPPED | OUTPATIENT
Start: 2018-11-07 | End: 2019-04-18

## 2018-11-07 RX ORDER — ONDANSETRON 2 MG/ML
4 INJECTION INTRAMUSCULAR; INTRAVENOUS EVERY 30 MIN PRN
Status: DISCONTINUED | OUTPATIENT
Start: 2018-11-07 | End: 2018-11-07 | Stop reason: HOSPADM

## 2018-11-07 RX ORDER — KETOROLAC TROMETHAMINE 30 MG/ML
30 INJECTION, SOLUTION INTRAMUSCULAR; INTRAVENOUS ONCE
Status: COMPLETED | OUTPATIENT
Start: 2018-11-07 | End: 2018-11-07

## 2018-11-07 RX ADMIN — SODIUM CHLORIDE 1000 ML: 9 INJECTION, SOLUTION INTRAVENOUS at 19:08

## 2018-11-07 RX ADMIN — KETOROLAC TROMETHAMINE 30 MG: 30 INJECTION, SOLUTION INTRAMUSCULAR at 19:22

## 2018-11-07 RX ADMIN — ONDANSETRON HYDROCHLORIDE 4 MG: 2 INJECTION, SOLUTION INTRAMUSCULAR; INTRAVENOUS at 19:09

## 2018-11-07 ASSESSMENT — ENCOUNTER SYMPTOMS
ACTIVITY CHANGE: 1
NAUSEA: 1
ABDOMINAL PAIN: 1
ABDOMINAL DISTENTION: 1
DIARRHEA: 1
APPETITE CHANGE: 1
ARTHRALGIAS: 1

## 2018-11-07 NOTE — TELEPHONE ENCOUNTER
Reason for call:  Patient reporting a symptom    Symptom or request: Patient is calling stating she is having severe bloating. She feels like she is carrying 15lbs of water weight in her stomach.    Duration (how long have symptoms been present): We were cut off before she could give me anymore information.     Have you been treated for this before? Yes    Additional comments:     Phone Number patient can be reached at:  Home number on file [phone] (home)    Best Time:  any    Can we leave a detailed message on this number:  YES    Call taken on 11/7/2018 at 12:21 PM by Eliana Sue

## 2018-11-07 NOTE — ED AVS SNAPSHOT
[facility] Emergency Department    [address] [place] [address]    Phone:  [phone]    Fax:  [phone]                                       Leah Cox   MRN: [id number]    Department:  [facility] Emergency Department   Date of Visit:  11/7/2018           After Visit Summary Signature Page     I have received my discharge instructions, and my questions have been answered. I have discussed any challenges I see with this plan with the nurse or doctor.    ..........................................................................................................................................  Patient/Patient Representative Signature      ..........................................................................................................................................  Patient Representative Print Name and Relationship to Patient    ..................................................               ................................................  Date                                   Time    ..........................................................................................................................................  Reviewed by Signature/Title    ...................................................              ..............................................  Date                                               Time          22EPIC Rev 08/18

## 2018-11-07 NOTE — ED TRIAGE NOTES
She has had bloating for months but today she feels weak, it is more painful and she is short of breath when she inhales.

## 2018-11-07 NOTE — TELEPHONE ENCOUNTER
[place]ia calls back and states she is really weak, hot, and feels like its hard to breath. She feels like her back is pushing against her ribs. She is afraid she has pneumonia.     RN advised Virginia go be evaluated in the Ed.    Namrata Ni RN

## 2018-11-07 NOTE — ED TRIAGE NOTES
She also noted she is having blood in her sputum. She also said yes to some of the self harm questions and says she stopped using her zoloft because it wasn't helping.  She says she feels safe today and does not want to hurt herself.

## 2018-11-07 NOTE — ED AVS SNAPSHOT
[facility] Emergency Department    [address]     [place] [address]    Phone:  [phone]    Fax:  [phone]                                       Leah Cox   MRN: [id number]    Department:  [facility] Emergency Department   Date of Visit:  11/7/2018           Patient Information     Date Of Birth          1990        Your diagnoses for this visit were:     Chest wall pain     Abdominal bloating        You were seen by Tasia Baker APRN CNP.      Follow-up Information     Follow up with Alize Ragland APRN CNP In 1 week.    Specialty:  Nurse Practitioner - Family    Contact information:    [address] [place] [address]  [phone]          Discharge Instructions         Chest Wall Pain: Costochondritis    The chest pain that you have had today is caused by costochondritis. This condition is caused by an inflammation of the cartilage joining your ribs to your breastbone. It is not caused by heart or lung problems. Your healthcare team has made sure that the chest pain you feel is not from a life threatening cause of chest pain such as heart attack, collapsed lung, blood clot in the lung, tear in the aorta, or esophageal rupture. The inflammation may have been brought on by a blow to the chest, lifting heavy objects, intense exercise, or an illness that made you cough and sneeze a lot. It often occurs during times of emotional stress. It can be painful, but it is not dangerous. It usually goes away in 1 to 2 weeks. But it may happen again. Rarely, a more serious condition may cause symptoms similar to costochondritis. That s why it s important to watch for the warning signs listed below.  Home care  Follow these guidelines when caring for yourself at home:    If you feel that emotional stress is a cause of your condition, try to figure out the sources of that stress. It may not be obvious. Learn ways to deal with the stress in your life. This can  include regular exercise, muscle relaxation, meditation, or simply taking time out for yourself.    You may use acetaminophen, ibuprofen, or naproxen to control pain, unless another pain medicine was prescribed. If you have liver or kidney disease or ever had a stomach ulcer, talk with your healthcare provider before using these medicines.    You can also help ease pain by using a hot, wet compress or heating pad. Use this with or without a medicated skin cream that helps relieves pain.    Do stretching exercise as advised by your provider.    Take any prescribed medicines as directed.  Follow-up care  Follow up with your healthcare provider, or as advised, if you do not start to get better in the next 2 days.  When to seek medical advice  Call your healthcare provider right away if any of these occur:    A change in the type of pain. Call if it feels different, becomes more serious, lasts longer, or spreads into your shoulder, arm, neck, jaw, or back.    Shortness of breath or pain gets worse when you breathe    Weakness, dizziness, or fainting    Cough with dark-colored sputum (phlegm) or blood    Abdominal pain    Dark red or black stools    Fever of 100.4 F (38 C) or higher, or as directed by your healthcare provider  Date Last Reviewed: 12/1/2016 2000-2018 [employer]. [address] [place] [address], [place] [address]. All rights reserved. This information is not intended as a substitute for professional medical care. Always follow your healthcare professional's instructions.          Your next 10 appointments already scheduled     Nov 09, 2018  8:30 AM CST   Nurse Only with PH NURSE   [facility]    [address] [place] [id number] [phone]            Nov 13, 2018 11:00 AM CST   Office Visit with NADJA Pierce CNP   [facility])    [address] [place] [id number]    [phone]           Bring a current list of meds and any records pertaining to this visit. For Physicals, please bring immunization records and any forms needing to be filled out. Please arrive 10 minutes early to complete paperwork.              24 Hour Appointment Hotline       To make an appointment at any [facility] clinic, call [phone] ([phone]). If you don't have a family doctor or clinic, we will help you find one. [facility] clinics are conveniently located to serve the needs of you and your family.             Review of your medicines      START taking        Dose / Directions Last dose taken    naproxen 500 MG tablet   Commonly known as:  NAPROSYN   Dose:  500 mg   Quantity:  16 tablet        Take 1 tablet (500 mg) by mouth 2 times daily (with meals) for 8 days   Refills:  0          Our records show that you are taking the medicines listed below. If these are incorrect, please call your family doctor or clinic.        Dose / Directions Last dose taken    levothyroxine 88 MCG tablet   Commonly known as:  SYNTHROID/LEVOTHROID   Dose:  88 mcg   Quantity:  90 tablet        Take 1 tablet (88 mcg) by mouth daily   Refills:  1        sertraline 100 MG tablet   Commonly known as:  ZOLOFT   Dose:  100 mg   Quantity:  90 tablet        Take 1 tablet (100 mg) by mouth daily   Refills:  0                Prescriptions were sent or printed at these locations (1 Prescription)                   [facility] [place], [place] - [address]   [address], [facility] [address]    Telephone:  [phone]   Fax:  [phone]   Hours:                  E-Prescribed (1 of 1)         naproxen (NAPROSYN) 500 MG tablet                Procedures and tests performed during your visit     CBC with platelets differential    CRP inflammation    Comprehensive metabolic panel    D dimer quantitative    HCG qualitative urine (UPT)    Peripheral IV catheter    TSH with free T4 reflex    UA with  "Microscopic    XR Chest 2 Views      Orders Needing Specimen Collection     None      Pending Results     Date and Time Order Name Status Description    2018 1933 XR Chest 2 Views Preliminary             Pending Culture Results     No orders found from 2018 to 2018.            Pending Results Instructions     If you had any lab results that were not finalized at the time of your Discharge, you can call the ED Lab Result RN at [phone]. You will be contacted by this team for any positive Lab results or changes in treatment. The nurses are available 7 days a week from 10A to 6:30P.  You can leave a message 24 hours per day and they will return your call.        Thank you for choosing [facility]       Thank you for choosing [facility] for your care. Our goal is always to provide you with excellent care. Hearing back from our patients is one way we can continue to improve our services. Please take a few minutes to complete the written survey that you may receive in the mail after you visit with us. Thank you!        [employer] Daily Deals Information     [facility] lets you send messages to your doctor, view your test results, renew your prescriptions, schedule appointments and more. To sign up, go to [email or web address] . Click on \"Log in\" on the left side of the screen, which will take you to the Welcome page. Then click on \"Sign up Now\" on the right side of the page.     You will be asked to enter the access code listed below, as well as some personal information. Please follow the directions to create your username and password.     Your access code is: [email or web address]  Expires: 2019  4:14 PM     Your access code will  in 90 days. If you need help or a new code, please call your [facility] clinic or [phone].        Care EveryWhere ID     This is your Care EveryWhere ID. This could be used by other organizations to access your [facility] medical records  [id number]        Equal Access to Services     [facility] " LOLY : Abisaiii ketan Garcia, wajhonatanda luqadaha, qaybta kayany gentile, maday finley. So [facility] [phone].    ATENCIÓN: Si habla español, tiene a varghese disposición servicios gratuitos de asistencia lingüística. Llame al [phone].    We comply with applicable federal civil rights laws and [place] laws. We do not discriminate on the basis of race, color, national origin, age, disability, sex, sexual orientation, or gender identity.            After Visit Summary       This is your record. Keep this with you and show to your community pharmacist(s) and doctor(s) at your next visit.

## 2018-11-07 NOTE — TELEPHONE ENCOUNTER
"Leah Cox is a 28 year old female who calls with abdominal bloating.      NURSING ASSESSMENT:  Description: Virginia has had some ongoing abdominal issues that she has been working with [facility] with. She has an appointment with GI in a few weeks but was hoping we could figure something out. Gassy, burpy, eating a lot, not eating a lot, some loose stools, some dry stools.  Today she feels like her hips are hurting and she feels like its harder to breath due to the \"sack of fluid\" on her abdomen.    Onset/duration:  Long time but feels it is somewhat worsening.  Precip. factors:  Also late on her period- LMP 2018.  She has had unprotected sex recently and will will be coming in for a pregnancy test in a few days with RN.  Associated symptoms:  Asked if she tried glueten free diet-she states that it didn't make a difference.  Then she states she is always hungry and never satisfied. When RN asked her to give an example of her diet on a day she states\" I don't really eat much, a few chips here and there, maybe a cookie. Just enough to not feel hungry.    Improves/worsens symptoms:  none  Pain scale (0-10)   0/10  LMP/preg/breast feedin2018    Last exam/Treatment:  2018  Allergies:   Allergies   Allergen Reactions     Cyclobenzaprine      Started giving her dreams       NURSING PLAN: Virginia will come in on Friday for a pregnancy test.  Also advised to do a clear liquid for a couple days to potentially cut down on the gas, bloating.  She has an appt with her PCP next week and GI in the next few weeks.     RECOMMENDED DISPOSITION:  Home care advice   Will comply with recommendation: Yes  If further questions/concerns or if symptoms do not improve, worsen or new symptoms develop, call your PCP or [facility] as soon as possible.      Guideline used:  Telephone Triage Protocols for Nurses, Fifth Edition, Jane Ni RN    "

## 2018-11-08 NOTE — DISCHARGE INSTRUCTIONS
Chest Wall Pain: Costochondritis    The chest pain that you have had today is caused by costochondritis. This condition is caused by an inflammation of the cartilage joining your ribs to your breastbone. It is not caused by heart or lung problems. Your healthcare team has made sure that the chest pain you feel is not from a life threatening cause of chest pain such as heart attack, collapsed lung, blood clot in the lung, tear in the aorta, or esophageal rupture. The inflammation may have been brought on by a blow to the chest, lifting heavy objects, intense exercise, or an illness that made you cough and sneeze a lot. It often occurs during times of emotional stress. It can be painful, but it is not dangerous. It usually goes away in 1 to 2 weeks. But it may happen again. Rarely, a more serious condition may cause symptoms similar to costochondritis. That s why it s important to watch for the warning signs listed below.  Home care  Follow these guidelines when caring for yourself at home:    If you feel that emotional stress is a cause of your condition, try to figure out the sources of that stress. It may not be obvious. Learn ways to deal with the stress in your life. This can include regular exercise, muscle relaxation, meditation, or simply taking time out for yourself.    You may use acetaminophen, ibuprofen, or naproxen to control pain, unless another pain medicine was prescribed. If you have liver or kidney disease or ever had a stomach ulcer, talk with your healthcare provider before using these medicines.    You can also help ease pain by using a hot, wet compress or heating pad. Use this with or without a medicated skin cream that helps relieves pain.    Do stretching exercise as advised by your provider.    Take any prescribed medicines as directed.  Follow-up care  Follow up with your healthcare provider, or as advised, if you do not start to get better in the next 2 days.  When to seek medical  advice  Call your healthcare provider right away if any of these occur:    A change in the type of pain. Call if it feels different, becomes more serious, lasts longer, or spreads into your shoulder, arm, neck, jaw, or back.    Shortness of breath or pain gets worse when you breathe    Weakness, dizziness, or fainting    Cough with dark-colored sputum (phlegm) or blood    Abdominal pain    Dark red or black stools    Fever of 100.4 F (38 C) or higher, or as directed by your healthcare provider  Date Last Reviewed: 12/1/2016 2000-2018 [employer]. [address] [place] [address]. All rights reserved. This information is not intended as a substitute for professional medical care. Always follow your healthcare professional's instructions.

## 2018-11-08 NOTE — ED PROVIDER NOTES
History     Chief Complaint   Patient presents with     Bloated     HPI  Leah Cox is a 28 year old female who presents to the ED today with an array of complaints.  Patient has hip pain, chest wall pain, abdominal bloating, pain with inspiration, noted blood in mouth after brushing her teeth 2 days.  Patient denies any URI symptoms.  Patient is past due for period.  Last period was end of September.  Smokes 2-3 cigarettes per day.  Patient c/o nausea, no vomiting.  Patient has had ongoing diarrhea since the abdominal bloating started 2 months ago.  Patient is scheduled to see GI next week.  Patient denies any hematochezia.  Patient endorses abdominal pain across lower abdomen/pelvic region.  Patient denies any ETOH or drug use.  Denies any abdominal surgical hx.  Patient denies any recent antibiotic use.    Problem List:    Patient Active Problem List    Diagnosis Date Noted     Diarrhea, unspecified type 08/02/2018     Priority: Medium     Abdominal pain, generalized 08/02/2018     Priority: Medium     Hypothyroidism, unspecified type 08/02/2018     Priority: Medium     Tobacco use disorder 05/01/2018     Priority: Medium     Major depressive disorder, recurrent episode, moderate (H) 04/20/2018     Priority: Medium     RENAY (generalized anxiety disorder) 04/20/2018     Priority: Medium     Segmental dysfunction of sacral region 01/31/2018     Priority: Medium     Bilateral low back pain with right-sided sciatica 01/31/2018     Priority: Medium     Segmental dysfunction of lumbar region 01/31/2018     Priority: Medium     Segmental dysfunction of thoracic region 01/31/2018     Priority: Medium     Ganglion cyst of dorsum of right wrist 07/24/2017     Priority: Medium     Metacarpal boss 07/24/2017     Priority: Medium     Excessive or frequent menstruation 03/14/2017     Priority: Medium     Adjustment disorder with depressed mood 03/03/2017     Priority: Medium     Carpal tunnel syndrome of right wrist  10/18/2016     Priority: Medium     Irregular periods 12/08/2015     Priority: Medium     Pelvic pain in female 12/08/2015     Priority: Medium     S/P LEEP of cervix 01/23/2013     Priority: Medium     5/15/09 NIL pap  6/30/11 ASCUS pap, + HR HPV.   8/30/11 Sandy bx: SUZAN 2, ECC SUZAN 1  3/27/12 Sandy bx: SUZAN 1, ECC: neg.  Pap: NIL, + HR HPV  9/26/12 ASCUS pap, + HR HPV  11/7/12 [place] bx: SUZAN 2, ECC: SUZAN 1.   1/23/13 LEEP: SUZAN 2 with free margins.   6/25/13 NIL pap     ** above information is from Care Everywhere  **  12/8/15 NIL pap, neg HPV. Plan: cotest in 1 yr, per Staff message from Dr. Farnsworth dated 2/5/18.  4/13/18 NIL pap, neg HR HPV.Plan: cotest in 3 years.          Past Medical History:    Past Medical History:   Diagnosis Date     ASCUS with positive high risk HPV cervical 09/26/2012     H/O colposcopy with cervical biopsy 11/07/2012     S/P LEEP of cervix 01/23/2013       Past Surgical History:    Past Surgical History:   Procedure Laterality Date     LEEP TX, CERVICAL  01/23/2013    SUZAN 2 with free margins - Care Everywhere.     RELEASE CARPAL TUNNEL Right 10/5/2016    Procedure: RELEASE CARPAL TUNNEL;  Surgeon: Christian Sebastian MD;  Location:  OR       Family History:    Family History   Problem Relation Age of Onset     Hypertension Father        Social History:  Marital Status:  Single [1]  Social History   Substance Use Topics     Smoking status: Current Every Day Smoker     Packs/day: 0.25     Smokeless tobacco: Never Used     Alcohol use 0.0 oz/week     0 Standard drinks or equivalent per week      Comment: occ.        Medications:      levothyroxine (SYNTHROID/LEVOTHROID) 88 MCG tablet   sertraline (ZOLOFT) 100 MG tablet         Review of Systems   Constitutional: Positive for activity change and appetite change.   Gastrointestinal: Positive for abdominal distention, abdominal pain, diarrhea and nausea.   Musculoskeletal: Positive for arthralgias.   All other systems reviewed and are  "negative.      Physical Exam   BP: (!) 124/98  Pulse: 102  Temp: 98.2  F (36.8  C)  Resp: 18  Height: 160 cm (5' 3\")  Weight: 83.9 kg (185 lb)  SpO2: 99 %      Physical Exam   Constitutional: She is oriented to person, place, and time. She appears well-developed and well-nourished. No distress.   HENT:   Head: Normocephalic.   Mouth/Throat: Oropharynx is clear and moist.   Eyes: Conjunctivae are normal.   Neck: Normal range of motion.   Cardiovascular: Regular rhythm and intact distal pulses.    No murmur heard.  Tachycardic   Pulmonary/Chest: Effort normal and breath sounds normal.   Abdominal: Soft. Bowel sounds are normal. There is tenderness (Right upper and lower quadrant, negative giang's sign, tenderness just distal to Mcburneys point).   Musculoskeletal: Normal range of motion.   Neurological: She is alert and oriented to person, place, and time.   Skin: Skin is warm and dry. She is not diaphoretic.   Psychiatric: She has a normal mood and affect.       ED Course     ED Course     Procedures      Results for orders placed or performed during the hospital encounter of 11/07/18 (from the past 24 hour(s))   UA with Microscopic   Result Value Ref Range    Color Urine Straw     Appearance Urine Clear     Glucose Urine Negative NEG^Negative mg/dL    Bilirubin Urine Negative NEG^Negative    Ketones Urine Negative NEG^Negative mg/dL    Specific Gravity Urine 1.009 1.003 - 1.035    Blood Urine Moderate (A) NEG^Negative    pH Urine 7.0 5.0 - 7.0 pH    Protein Albumin Urine Negative NEG^Negative mg/dL    Urobilinogen mg/dL 0.0 0.0 - 2.0 mg/dL    Nitrite Urine Negative NEG^Negative    Leukocyte Esterase Urine Negative NEG^Negative    Source Midstream Urine     WBC Urine <1 0 - 5 /HPF    RBC Urine 4 (H) 0 - 2 /HPF    Bacteria Urine Few (A) NEG^Negative /HPF    Squamous Epithelial /HPF Urine 3 (H) 0 - 1 /HPF    Mucous Urine Present (A) NEG^Negative /LPF   HCG qualitative urine (UPT)   Result Value Ref Range    HCG Qual " Urine Negative NEG^Negative   CBC with platelets differential   Result Value Ref Range    WBC 12.4 (H) 4.0 - 11.0 10e9/L    RBC Count 4.55 3.8 - 5.2 10e12/L    Hemoglobin 14.2 11.7 - 15.7 g/dL    Hematocrit 41.9 35.0 - 47.0 %    MCV 92 78 - 100 fl    MCH 31.2 26.5 - 33.0 pg    MCHC 33.9 31.5 - 36.5 g/dL    RDW 12.6 10.0 - 15.0 %    Platelet Count 244 150 - 450 10e9/L    Diff Method Automated Method     % Neutrophils 83.2 %    % Lymphocytes 11.1 %    % Monocytes 3.9 %    % Eosinophils 0.6 %    % Basophils 0.5 %    % Immature Granulocytes 0.7 %    Nucleated RBCs 0 0 /100    Absolute Neutrophil 10.3 (H) 1.6 - 8.3 10e9/L    Absolute Lymphocytes 1.4 0.8 - 5.3 10e9/L    Absolute Monocytes 0.5 0.0 - 1.3 10e9/L    Absolute Basophils 0.1 0.0 - 0.2 10e9/L    Abs Immature Granulocytes 0.1 0 - 0.4 10e9/L    Absolute Nucleated RBC 0.0    Comprehensive metabolic panel   Result Value Ref Range    Sodium 140 133 - 144 mmol/L    Potassium 3.7 3.4 - 5.3 mmol/L    Chloride 103 94 - 109 mmol/L    Carbon Dioxide 29 20 - 32 mmol/L    Anion Gap 8 3 - 14 mmol/L    Glucose 78 70 - 99 mg/dL    Urea Nitrogen 13 7 - 30 mg/dL    Creatinine 0.77 0.52 - 1.04 mg/dL    GFR Estimate 89 >60 mL/min/1.7m2    GFR Estimate If Black >90 >60 mL/min/1.7m2    Calcium 8.7 8.5 - 10.1 mg/dL    Bilirubin Total 0.5 0.2 - 1.3 mg/dL    Albumin 3.9 3.4 - 5.0 g/dL    Protein Total 7.4 6.8 - 8.8 g/dL    Alkaline Phosphatase 62 40 - 150 U/L    ALT 34 0 - 50 U/L    AST 17 0 - 45 U/L   CRP inflammation   Result Value Ref Range    CRP Inflammation 3.0 0.0 - 8.0 mg/L   TSH with free T4 reflex   Result Value Ref Range    TSH 2.52 0.40 - 4.00 mU/L   D dimer quantitative   Result Value Ref Range    D Dimer 0.3 0.0 - 0.50 ug/ml FEU   XR Chest 2 Views    Narrative    CHEST TWO VIEW   11/7/2018 7:59 PM     HISTORY: Cough.    COMPARISON: None.      Impression    IMPRESSION: No acute cardiopulmonary disease.       Medications   ondansetron (ZOFRAN) injection 4 mg (4 mg Intravenous  Given 11/7/18 1909)   0.9% sodium chloride BOLUS (1,000 mLs Intravenous New Bag 11/7/18 1908)   ketorolac (TORADOL) injection 30 mg (30 mg Intravenous Given 11/7/18 1922)       Assessments & Plan (with Medical Decision Making)  Virginia is a 28-year-old female, presents with chest wall pain and abdominal bloating.  Please refer to HPI and focused exam.  Patient has an appointment with GI next week for her abdominal bloating.  Her chest wall pain is likely inflammatory in nature given her smoking history.  Patient arrives here hemodynamically stable and afebrile.  Patient reports feeling hot and cold and is worried about her thyroid function.  Peripheral IV was established, patient was given IV Toradol here for her symptoms as well as Zofran for nausea with improvement.  Patient was also given a liter of normal saline for her mild tachycardia.  Patient's white count is mildly elevated at 12.4 with a left shift today, CMP, CRP, TSH, d-dimer are all within normal limits.  Patient has 4 red cells in her urine, urine pregnancy is negative and there is no signs of infection.  Patient does not currently menstruating.chest x-ray was obtained to rule out any acute findings and is negative.  I discussed with patient that her workup today is reassuring, I feel any serious etiology has been ruled out today.  I did recommend patient have her urine rechecked in a few weeks to make sure the blood has resolved.  With regard to her chest wall pain I am going to start her on naproxen for the next 8 days.  Patient was instructed to avoid any other NSAIDs.  Patient encouraged to follow-up with GI as scheduled next week for her abdominal bloating.  Reasons to return to the emergency department were discussed in detail.  Patient is agreeable to plan of care and discharged in stable condition.     I have reviewed the nursing notes.    I have reviewed the findings, diagnosis, plan and need for follow up with the patient.    New  Prescriptions    NAPROXEN (NAPROSYN) 500 MG TABLET    Take 1 tablet (500 mg) by mouth 2 times daily (with meals) for 8 days       Final diagnoses:   Chest wall pain   Abdominal bloating       11/7/2018   [facility] EMERGENCY DEPARTMENT     Tasia Baker APRN CNP  11/07/18 2014

## 2018-11-13 ENCOUNTER — OFFICE VISIT (OUTPATIENT)
Dept: FAMILY MEDICINE | Facility: CLINIC | Age: 28
End: 2018-11-13
Payer: COMMERCIAL

## 2018-11-13 VITALS
OXYGEN SATURATION: 97 % | WEIGHT: 186.6 LBS | HEART RATE: 87 BPM | TEMPERATURE: 97.5 F | RESPIRATION RATE: 26 BRPM | SYSTOLIC BLOOD PRESSURE: 116 MMHG | DIASTOLIC BLOOD PRESSURE: 80 MMHG | BODY MASS INDEX: 33.05 KG/M2

## 2018-11-13 DIAGNOSIS — Z63.9 FAMILY DYSFUNCTION: ICD-10-CM

## 2018-11-13 DIAGNOSIS — F41.1 GAD (GENERALIZED ANXIETY DISORDER): ICD-10-CM

## 2018-11-13 DIAGNOSIS — F33.1 MAJOR DEPRESSIVE DISORDER, RECURRENT EPISODE, MODERATE (H): Primary | ICD-10-CM

## 2018-11-13 PROCEDURE — 99214 OFFICE O/P EST MOD 30 MIN: CPT | Performed by: NURSE PRACTITIONER

## 2018-11-13 RX ORDER — DULOXETIN HYDROCHLORIDE 30 MG/1
30 CAPSULE, DELAYED RELEASE ORAL 2 TIMES DAILY
Qty: 60 CAPSULE | Refills: 1 | Status: SHIPPED | OUTPATIENT
Start: 2018-11-13 | End: 2019-04-18

## 2018-11-13 SDOH — SOCIAL STABILITY - SOCIAL INSECURITY: PROBLEM RELATED TO PRIMARY SUPPORT GROUP, UNSPECIFIED: Z63.9

## 2018-11-13 ASSESSMENT — ANXIETY QUESTIONNAIRES
1. FEELING NERVOUS, ANXIOUS, OR ON EDGE: NEARLY EVERY DAY
7. FEELING AFRAID AS IF SOMETHING AWFUL MIGHT HAPPEN: MORE THAN HALF THE DAYS
3. WORRYING TOO MUCH ABOUT DIFFERENT THINGS: NEARLY EVERY DAY
GAD7 TOTAL SCORE: 20
IF YOU CHECKED OFF ANY PROBLEMS ON THIS QUESTIONNAIRE, HOW DIFFICULT HAVE THESE PROBLEMS MADE IT FOR YOU TO DO YOUR WORK, TAKE CARE OF THINGS AT HOME, OR GET ALONG WITH OTHER PEOPLE: VERY DIFFICULT
6. BECOMING EASILY ANNOYED OR IRRITABLE: NEARLY EVERY DAY
2. NOT BEING ABLE TO STOP OR CONTROL WORRYING: NEARLY EVERY DAY
5. BEING SO RESTLESS THAT IT IS HARD TO SIT STILL: NEARLY EVERY DAY

## 2018-11-13 ASSESSMENT — PATIENT HEALTH QUESTIONNAIRE - PHQ9: 5. POOR APPETITE OR OVEREATING: NEARLY EVERY DAY

## 2018-11-13 ASSESSMENT — PAIN SCALES - GENERAL: PAINLEVEL: NO PAIN (0)

## 2018-11-13 NOTE — MR AVS SNAPSHOT
After Visit Summary   11/13/2018    Leah Cox    MRN: [id number]           Patient Information     Date Of Birth          1990        Visit Information        Provider Department      11/13/2018 11:00 AM Alize Ragland APRN CNP [facility]        Today's Diagnoses     Major depressive disorder, recurrent episode, moderate (H)    -  1    RENAY (generalized anxiety disorder)        Family dysfunction           Follow-ups after your visit        Follow-up notes from your care team     Return in about 4 weeks (around 12/11/2018) for Depression follow up.      Who to contact     If you have questions or need follow up information about today's clinic visit or your schedule please contact [facility] directly at [phone].  Normal or non-critical lab and imaging results will be communicated to you by MyChart, letter or phone within 4 business days after the clinic has received the results. If you do not hear from us within 7 days, please contact the clinic through MyChart or phone. If you have a critical or abnormal lab result, we will notify you by phone as soon as possible.  Submit refill requests through [employer] or call your pharmacy and they will forward the refill request to us. Please allow 3 business days for your refill to be completed.          Additional Information About Your Visit        Care EveryWhere ID     This is your Care EveryWhere ID. This could be used by other organizations to access your [place] medical records  [id number]        Your Vitals Were     Pulse Temperature Respirations Pulse Oximetry BMI (Body Mass Index)       87 97.5  F (36.4  C) (Temporal) 26 97% 33.05 kg/m2        Blood Pressure from Last 3 Encounters:   11/13/18 116/80   11/07/18 (!) 124/100   08/02/18 124/83    Weight from Last 3 Encounters:   11/13/18 186 lb 9.6 oz (84.6 kg)   11/07/18 185 lb (83.9 kg)   08/02/18 176 lb 9.6 oz (80.1 kg)              Today, you had  the following     No orders found for display         Today's Medication Changes          These changes are accurate as of 11/13/18 11:59 PM.  If you have any questions, ask your nurse or doctor.               Start taking these medicines.        Dose/Directions    DULoxetine 30 MG EC capsule   Commonly known as:  CYMBALTA   Used for:  Major depressive disorder, recurrent episode, moderate (H), RENAY (generalized anxiety disorder)   Started by:  Alize Ragland APRN CNP        Dose:  30 mg   Take 1 capsule (30 mg) by mouth 2 times daily   Quantity:  60 capsule   Refills:  1            Where to get your medicines      These medications were sent to [facility], [place] - [address] [place] [address] [facility] [address]     Phone:  [phone]     DULoxetine 30 MG EC capsule                Primary Care Provider Office Phone # Fax #    NADJA Pierce [phone]       [address]   [facility] [address]        Equal Access to Services     [facility]: Hadii ketan ku hadasho Soomaali, waaxda luqadaha, qaybta kaalmada adeegyada, waxay soniain haytanvi good . So [facility] [phone].    ATENCIÓN: Si habla español, tiene a varghese disposición servicios gratuitos de asistencia lingüística. Llame al [phone].    We comply with applicable federal civil rights laws and [place] laws. We do not discriminate on the basis of race, color, national origin, age, disability, sex, sexual orientation, or gender identity.            Thank you!     Thank you for choosing [facility]  for your care. Our goal is always to provide you with excellent care. Hearing back from our patients is one way we can continue to improve our services. Please take a few minutes to complete the written survey that you may receive in the mail after your visit with us. Thank you!             Your Updated Medication List - Protect others around you: Learn how to safely use,  store and throw away your medicines at www.disposemymeds.org.          This list is accurate as of 11/13/18 11:59 PM.  Always use your most recent med list.                   Brand Name Dispense Instructions for use Diagnosis    DULoxetine 30 MG EC capsule    CYMBALTA    60 capsule    Take 1 capsule (30 mg) by mouth 2 times daily    Major depressive disorder, recurrent episode, moderate (H), RENAY (generalized anxiety disorder)       levothyroxine 88 MCG tablet    SYNTHROID/LEVOTHROID    90 tablet    Take 1 tablet (88 mcg) by mouth daily    Hypothyroidism, unspecified type       naproxen 500 MG tablet    NAPROSYN    16 tablet    Take 1 tablet (500 mg) by mouth 2 times daily (with meals) for 8 days

## 2018-11-13 NOTE — PROGRESS NOTES
SUBJECTIVE:   Leah Cox is a 28 year old female who presents to clinic today for the following health issues:      Medication Followup of Zoloft    Taking Medication as prescribed: NO-has been out for a couple weeks, did not see any improvement in it    Side Effects:  None    Medication Helping Symptoms:  NO. Would like to see what else she can try       The patient is a 28-year-old female seen in clinic today for the management of depression and anxiety.  She is living with her sister and her .  She states she has a room in the basement.  In addition to working her full-time job during the day, she then comes home after work and takes care of her sister's children while her sister goes to work.  Apparently the father is pretty disengaged.  According to the patient, she is frequently criticized.  She feels as though she is trapped, cannot live a life of her own.  She states she is financially unable to move out, stating she cannot afford rent along with paying her other bills.  She also states she is unable to get another job because she has to take care of these children, even though they have a father.  She has been living with them for quite some time, and feels responsible for them.  She does not feel that the father takes care of them adequately and she is concerned what would happen to them if she were to move out.  She states both of her parents are drug addicts.  Her mother recently moved away, and she believes her father is planning to move away as well.  He is currently homeless.  Virginia had been taking Zoloft 100 mg daily, did not feel that it was really helpful.  She discontinued taking it the end of October    Problem list and histories reviewed & adjusted, as indicated.  Additional history: as documented    BP Readings from Last 3 Encounters:   11/13/18 116/80   11/07/18 (!) 124/100   08/02/18 124/83    Wt Readings from Last 3 Encounters:   11/13/18 186 lb 9.6 oz (84.6 kg)  "  11/07/18 185 lb (83.9 kg)   08/02/18 176 lb 9.6 oz (80.1 kg)                    Reviewed and updated as needed this visit by clinical staff       Reviewed and updated as needed this visit by Provider         ROS:  Constitutional, HEENT, cardiovascular, pulmonary, gi and gu systems are negative, except as otherwise noted.    OBJECTIVE:     /80  Pulse 87  Temp 97.5  F (36.4  C) (Temporal)  Resp 26  Wt 186 lb 9.6 oz (84.6 kg)  SpO2 97%  BMI 33.05 kg/m2  Body mass index is 33.05 kg/(m^2).   The patient is well-groomed and appropriately dressed.  She is on time for her appointment.  She is emotionally distraught and tearful.  Mentation normal; she is able to identify her problem of anxiety, isolation, lack of purpose for self, and lack of a plan to improve her situation.  She feels helpless and trapped.  She seems to feel victimized by her sister and brother-in-law    Her PHQ 9 score is 24, endorsing anhedonia, hopelessness, difficulty sleeping oral swelling to sleep too much, overeating, feeling like a failure, and thoughts of being better off dead nearly every day.  She states she has difficulty concentrating and feels tired more than half days.  When asked about thoughts of being better off dead nearly every day, she does not have a plan for suicide, but states that she frequently wishes her sister would find her dead in her room, as she had found her previous boyfriend, to \"know how it feels to lose somebody \".    ASSESSMENT/PLAN:     Problem List Items Addressed This Visit        Medium    Major depressive disorder, recurrent episode, moderate (H) - Primary    Relevant Medications    DULoxetine (CYMBALTA) 30 MG EC capsule    RENAY (generalized anxiety disorder)    Relevant Medications    DULoxetine (CYMBALTA) 30 MG EC capsule    Family dysfunction           Virginia spent most of the appointment time describing her situation and how she feels, but for every suggestion for improvement, while she " acknowledges the need to be on her own and manage her own problems rather than her sisters, she has excuses for why she cannot do so.  While expressing a need and desire to be on her own, to be financially responsible for herself, she seems to feel responsible for her sister's children and provides reasons why she cannot work a second job in order to be financially able to afford her own place.  I think she could benefit from counseling and some direction in decision making.  However, she declines at this time.  I do believe she should be on an antidepressant.  Since Zoloft did not seem to be helpful, we will try Cymbalta.  She is going to take 30 mg daily for  a week, then increase to twice daily.  If this works well for her without side effect, will put her on 60 mg of Cymbalta once daily.  She is to follow-up in clinic in 4 weeks for recheck    This was a 30-minute appointment of which greater than 50% of time was devoted to counseling and developing a plan of care    NADJA Pierce [facility]

## 2018-11-14 ASSESSMENT — ANXIETY QUESTIONNAIRES: GAD7 TOTAL SCORE: 20

## 2018-11-15 ENCOUNTER — MEDICAL CORRESPONDENCE (OUTPATIENT)
Dept: HEALTH INFORMATION MANAGEMENT | Facility: CLINIC | Age: 28
End: 2018-11-15

## 2018-11-15 ENCOUNTER — TRANSFERRED RECORDS (OUTPATIENT)
Dept: HEALTH INFORMATION MANAGEMENT | Facility: CLINIC | Age: 28
End: 2018-11-15

## 2018-11-15 PROBLEM — Z63.9 FAMILY DYSFUNCTION: Status: ACTIVE | Noted: 2018-11-15

## 2018-11-15 ASSESSMENT — PATIENT HEALTH QUESTIONNAIRE - PHQ9: SUM OF ALL RESPONSES TO PHQ QUESTIONS 1-9: 24

## 2019-01-25 ENCOUNTER — THERAPY VISIT (OUTPATIENT)
Dept: CHIROPRACTIC MEDICINE | Facility: CLINIC | Age: 29
End: 2019-01-25
Payer: COMMERCIAL

## 2019-01-25 DIAGNOSIS — M99.02 SEGMENTAL DYSFUNCTION OF THORACIC REGION: ICD-10-CM

## 2019-01-25 DIAGNOSIS — M99.03 SEGMENTAL DYSFUNCTION OF LUMBAR REGION: ICD-10-CM

## 2019-01-25 DIAGNOSIS — M99.04 SEGMENTAL DYSFUNCTION OF SACRAL REGION: ICD-10-CM

## 2019-01-25 DIAGNOSIS — M54.41 BILATERAL LOW BACK PAIN WITH RIGHT-SIDED SCIATICA, UNSPECIFIED CHRONICITY: ICD-10-CM

## 2019-01-25 PROCEDURE — 99212 OFFICE O/P EST SF 10 MIN: CPT | Mod: 25 | Performed by: CHIROPRACTOR

## 2019-01-25 PROCEDURE — 98941 CHIROPRACT MANJ 3-4 REGIONS: CPT | Mod: AT | Performed by: CHIROPRACTOR

## 2019-01-25 NOTE — PROGRESS NOTES
Visit #:  3 of 8 based on treatment plan 1/31/2018    Subjective:  Leah Cox is a 27 year old female who is seen in f/u up for:        Bilateral low back pain with right-sided sciatica, unspecified chronicity  Segmental dysfunction of lumbar region  Segmental dysfunction of thoracic region  Segmental dysfunction of sacral region.     Since last visit on 2/23/2018,  Leah Cox reports the following changes: Patient presents and states that she is having issues with her right sided sciatica when she pulled something, and then felt soreness when she was trying to lift at work. 2 days later the worst pain went away, but it has been lingering. She has ibuprofen which helped and a tennis ball. She is tossing and turning at night time. She feels pain all the way up to her shoulder blades. Her friend massaged her and thought it was her spine that was the issue. She rates her current pain 7/10, sensitive to touch and burning at times. She feels pain radiating down to her knee.          Objective:  The following was observed:    LAROM: LLF mildly restricted with pain on the right side, Flexion causes pulling on Right SI joint    P: pain elicited on palpation, right piriformis    A: static palpation demonstrates intersegmental asymmetry     R: motion palpation notes restricted motion    T: localized muscle spasm at: Gluteal, Lumbar erector spine and Piriformis R>>L      Assessment:    Segmental spinal dysfunction/restrictions found at:  T1 RR, LRR  T5 E, FR  T10 E, FR  L4 RR, LRR  Right SI posterior    Diagnoses:      1. Bilateral low back pain with right-sided sciatica, unspecified chronicity    2. Segmental dysfunction of lumbar region    3. Segmental dysfunction of thoracic region    4. Segmental dysfunction of sacral region        Patient's condition:  Patient had restrictions pre-manipulation    Treatment effectiveness:  Post manipulation there is better intersegmental movement and Patient claims to feel  looser post manipulation      Procedures:  Level 2 re-e-xam  CMT:  71567 Chiropractic manipulative treatment 3-4 regions performed   Thoracic: Diversified, T1, T5, T10+, Prone  Lumbar: Drop Table, L4, Prone  Pelvis: Drop Table, PSIS Right , Prone    Modalities:  66407: MSTM:  To Gluteal, Lumbar erector spine and Piriformis  for 5 min    Therapeutic procedures:  Gave patient Ice instructions post adjustment, and instructions for acute care  Continue with tennis back to right piriformis      Prognosis: Good    Progress towards Goals:   Decrease pain from 6/10 to 3/10 in 4 treatments.   Be able to work and sleep without pain.       Response to Treatment:   Exacerbation of previous symptoms into right sciatica      Recommendations:    Instructions:ice 20 minutes every other hour as needed and stretch as instructed at visit    Follow-up:  Continue treatment Friday. Patient will get an other tennis ball today. She would like to consider an MRI if she doesn't get improvement with care.

## 2019-02-02 DIAGNOSIS — E03.9 HYPOTHYROIDISM, UNSPECIFIED TYPE: ICD-10-CM

## 2019-02-04 RX ORDER — LEVOTHYROXINE SODIUM 88 UG/1
TABLET ORAL
Qty: 90 TABLET | Refills: 1 | Status: SHIPPED | OUTPATIENT
Start: 2019-02-04 | End: 2019-04-19 | Stop reason: DRUGHIGH

## 2019-02-04 NOTE — TELEPHONE ENCOUNTER
"Prescription approved per RN refill protocol.  Fely Moon RN, BSN    synthroid  Last Written Prescription Date:  8/2/2018  Last Fill Quantity: 90,  # refills: 1   Last office visit: 11/13/2018 with prescribing provider:  11/13/2018   Future Office Visit:      Requested Prescriptions   Pending Prescriptions Disp Refills     levothyroxine (SYNTHROID/LEVOTHROID) 88 MCG tablet [Pharmacy Med Name: LEVOTHYROXINE SODIUM 88MCG TABS] 90 tablet 1     Sig: TAKE ONE TABLET BY MOUTH EVERY DAY    Thyroid Protocol Passed - 2/2/2019  1:43 PM       Passed - Patient is 12 years or older       Passed - Recent (12 mo) or future (30 days) visit within the authorizing provider's specialty    Patient had office visit in the last 12 months or has a visit in the next 30 days with authorizing provider or within the authorizing provider's specialty.  See \"Patient Info\" tab in inbasket, or \"Choose Columns\" in Meds & Orders section of the refill encounter.             Passed - Medication is active on med list       Passed - Normal TSH on file in past 12 months    Recent Labs   Lab Test 11/07/18  1854   TSH 2.52             Passed - No active pregnancy on record    If patient is pregnant or has had a positive pregnancy test, please check TSH.         Passed - No positive pregnancy test in past 12 months    If patient is pregnant or has had a positive pregnancy test, please check TSH.            Fely Moon RN on 2/4/2019 at 3:13 PM    "

## 2019-03-21 ENCOUNTER — NURSE TRIAGE (OUTPATIENT)
Dept: NURSING | Facility: CLINIC | Age: 29
End: 2019-03-21

## 2019-03-21 ENCOUNTER — HOSPITAL ENCOUNTER (EMERGENCY)
Facility: CLINIC | Age: 29
Discharge: HOME | End: 2019-03-21
Attending: EMERGENCY MEDICINE | Admitting: EMERGENCY MEDICINE
Payer: COMMERCIAL

## 2019-03-21 ENCOUNTER — APPOINTMENT (OUTPATIENT)
Dept: GENERAL RADIOLOGY | Facility: CLINIC | Age: 29
End: 2019-03-21
Attending: EMERGENCY MEDICINE
Payer: COMMERCIAL

## 2019-03-21 VITALS
HEART RATE: 82 BPM | BODY MASS INDEX: 32.96 KG/M2 | WEIGHT: 186 LBS | TEMPERATURE: 98.6 F | DIASTOLIC BLOOD PRESSURE: 85 MMHG | OXYGEN SATURATION: 98 % | RESPIRATION RATE: 18 BRPM | HEIGHT: 63 IN | SYSTOLIC BLOOD PRESSURE: 114 MMHG

## 2019-03-21 DIAGNOSIS — S30.1XXA CONTUSION OF ILIAC CREST, INITIAL ENCOUNTER: ICD-10-CM

## 2019-03-21 LAB — HCG UR QL: NEGATIVE

## 2019-03-21 PROCEDURE — 72170 X-RAY EXAM OF PELVIS: CPT | Mod: TC

## 2019-03-21 PROCEDURE — 81025 URINE PREGNANCY TEST: CPT | Performed by: EMERGENCY MEDICINE

## 2019-03-21 PROCEDURE — 99284 EMERGENCY DEPT VISIT MOD MDM: CPT | Performed by: EMERGENCY MEDICINE

## 2019-03-21 PROCEDURE — 99283 EMERGENCY DEPT VISIT LOW MDM: CPT | Mod: Z6 | Performed by: EMERGENCY MEDICINE

## 2019-03-21 ASSESSMENT — MIFFLIN-ST. JEOR: SCORE: 1537.82

## 2019-03-21 NOTE — ED AVS SNAPSHOT
[facility] Emergency Department  [address] [place] [address]  Phone:  [phone]  Fax:  [phone]                                    Leah Cox   MRN: [id number]    Department:  [facility] Emergency Department   Date of Visit:  3/21/2019           After Visit Summary Signature Page    I have received my discharge instructions, and my questions have been answered. I have discussed any challenges I see with this plan with the nurse or doctor.    ..........................................................................................................................................  Patient/Patient Representative Signature      ..........................................................................................................................................  Patient Representative Print Name and Relationship to Patient    ..................................................               ................................................  Date                                   Time    ..........................................................................................................................................  Reviewed by Signature/Title    ...................................................              ..............................................  Date                                               Time          22EPIC Rev 08/18

## 2019-03-22 NOTE — TELEPHONE ENCOUNTER
Pt hit her R hip on a sharp table on Monday, states bruising is getting worse.  She reports pain with certain movements like twisting.  She also reports feeling nauseous at times.  She also reports a single episode of rectal bleeding today, 2 drops on the surface of her stool.       Disposition:  See a provider within 24 hours.  Advised her to call back if new or worsening symptoms develop.  She verbalized understanding and had no further questions, transferred to scheduling.     7:30PM:  Writer called patient back and left a detailed VM message to be seen in the ED tonight based upon multiple symptoms and to r/o an abdominal injury.         Elma Ferris, NADIA/LEMUEL      Reason for Disposition    [1] Large swelling or bruise (> 2 inches or 5 cm) AND [2] able to bear weight    Additional Information    Negative: Serious injury with multiple fractures    Negative: [1] Major bleeding (e.g., actively dripping or spurting) AND [2] can't be stopped    Negative: Bullet wound, stabbed by knife, or other serious penetrating wound    Negative: Looks like a dislocated joint (crooked or deformed)    Negative: Can't stand (bear weight) or walk    Negative: Sounds like a life-threatening emergency to the triager    Negative: Wound looks infected    Negative: Puncture wound of hip area    Negative: Skin is split open or gaping  (or length > 1/2 inch or 12 mm)    Negative: [1] Bleeding AND [2] won't stop after 10 minutes of direct pressure (using correct technique)    Negative: [1] Dirt in the wound AND [2] not removed with 15 minutes of scrubbing    Negative: Sounds like a serious injury to the triager    Negative: [1] SEVERE pain AND [2] not improved 2 hours after pain medicine/ice packs    Negative: Suspicious history for the injury    Negative: Shock suspected (e.g., cold/pale/clammy skin, too weak to stand, low BP, rapid pulse)    Negative: Difficult to awaken or acting confused  (e.g., disoriented, slurred speech)    Negative:  "Passed out (i.e., lost consciousness, collapsed and was not responding)    Negative: [1] Vomiting AND [2] contains red blood or black (\"coffee ground\") material  (Exception: few red streaks in vomit that only happened once)    Negative: Sounds like a life-threatening emergency to the triager    Negative: Diarrhea is main symptom    Negative: Stool color other than brown or tan is main concern  (no bleeding and no melena)    Negative: SEVERE rectal bleeding (large blood clots; on and off, or constant bleeding)    Negative: SEVERE dizziness (e.g., unable to stand, requires support to walk, feels like passing out now)    Negative: [1] MODERATE rectal bleeding (small blood clots, passing blood without stool, or toilet water turns red) AND [2] more than once a day    Negative: Pale skin (pallor) of new onset or worsening    Negative: Tarry or jet black-colored stool (not dark green)    Negative: [1] Constant abdominal pain AND [2] present > 2 hours    Negative: Rectal foreign body (i.e., now or within past week;  inserted or swallowed)    Negative: High-risk adult (e.g., prior surgery on aorta, abdominal aortic aneurysm)    Negative: Taking Coumadin (warfarin) or other strong blood thinner, or known bleeding disorder (e.g., thrombocytopenia)    Negative: Known cirrhosis of the liver (or history of liver failure or ascites)    Negative: [1] Colonoscopy AND [2] in past 72 hours    Negative: Patient sounds very sick or weak to the triager    Negative: MODERATE rectal bleeding (small blood clots, passing blood without stool, or toilet water turns red)    Negative: MILD rectal bleeding (more than just a few drops or streaks)    Negative: Cancer of rectum or intestines (colon)    Negative: Radiation therapy to lower abdomen or pelvis    Negative: [1] Rectal bleeding is minimal (e.g., blood just on toilet paper, few drops, streaks on surface of normal formed BM) AND [2] bleeding recurs 3 or more times on treatment    Negative: " Rectal bleeding is a chronic symptom (recurrent or ongoing AND present > 4 weeks)    Negative: Age > 50 years    Negative: Family history of cancer of intestines    Negative: NO physician examination for rectal bleeding in past year    [1] Normal formed BM AND [2] few streaks or drops of blood on surface of BM (all triage questions negative)    Followed an abdomen (stomach) injury    Protocols used: HIP INJURY-ADULT-, RECTAL BLEEDING-ADULT-AH, ABDOMINAL PAIN - FEMALE-ADULT-AH

## 2019-03-22 NOTE — ED TRIAGE NOTES
"Pt ran into a table with her right lower abd on Monday.  Called the nurse line tonight over concerns of nausea and blood in her stool.  Pt states blood was bright red blood and \"blended\" into her stool.  "

## 2019-03-22 NOTE — ED PROVIDER NOTES
History     Chief Complaint   Patient presents with     Hip Pain     The history is provided by the patient.     Leah Cox is a 28 year old female who presents to the emergency department for hip pain. Patient report injuring her right hip at work 4 days ago. She states she ran into a table with her right hip. She states it is painful and bruised.  Incident occurred on Monday night.  She denies any pain in her back, down her leg or numbness or tingling. She states she called the nurse line tonight over concerns of nausea and blood in her stool. She states she had bright red blood in her stool.  With further query patient states that she had 2 drops of bright red blood on her stool.  Currently denies diffuse abdominal pain, nausea, vomiting, fever, chills, or diarrhea/dark stools.  Her last period was over 2 months ago.  She does not think she is pregnant and contributes this to her thyroid disease.  She denies other unusual bleeding or bruising.  She took ibuprofen but is not on any other antiplatelet therapy.    Allergies:  Allergies   Allergen Reactions     Cyclobenzaprine      Started giving her dreams       Problem List:    Patient Active Problem List    Diagnosis Date Noted     Family dysfunction 11/15/2018     Priority: Medium     Diarrhea, unspecified type 08/02/2018     Priority: Medium     Abdominal pain, generalized 08/02/2018     Priority: Medium     Hypothyroidism, unspecified type 08/02/2018     Priority: Medium     Tobacco use disorder 05/01/2018     Priority: Medium     Major depressive disorder, recurrent episode, moderate (H) 04/20/2018     Priority: Medium     RENAY (generalized anxiety disorder) 04/20/2018     Priority: Medium     Segmental dysfunction of sacral region 01/31/2018     Priority: Medium     Bilateral low back pain with right-sided sciatica 01/31/2018     Priority: Medium     Segmental dysfunction of lumbar region 01/31/2018     Priority: Medium     Segmental dysfunction of  thoracic region 01/31/2018     Priority: Medium     Ganglion cyst of dorsum of right wrist 07/24/2017     Priority: Medium     Metacarpal boss 07/24/2017     Priority: Medium     Excessive or frequent menstruation 03/14/2017     Priority: Medium     Adjustment disorder with depressed mood 03/03/2017     Priority: Medium     Carpal tunnel syndrome of right wrist 10/18/2016     Priority: Medium     Irregular periods 12/08/2015     Priority: Medium     Pelvic pain in female 12/08/2015     Priority: Medium     S/P LEEP of cervix 01/23/2013     Priority: Medium     5/15/09 NIL pap  6/30/11 ASCUS pap, + HR HPV.   8/30/11 [place] bx: SUZAN 2, ECC SUZAN 1  3/27/12 [place] bx: SUZAN 1, ECC: neg.  Pap: NIL, + HR HPV  9/26/12 ASCUS pap, + HR HPV  11/7/12 [place] bx: SUZAN 2, ECC: SUZAN 1.   1/23/13 LEEP: SUZAN 2 with free margins.   6/25/13 NIL pap     ** above information is from [facility]  **  12/8/15 NIL pap, neg HPV. Plan: cotest in 1 yr, per Staff message from Dr. Farnsworth dated 2/5/18.  4/13/18 NIL pap, neg HR HPV.Plan: cotest in 3 years.          Past Medical History:    Past Medical History:   Diagnosis Date     ASCUS with positive high risk HPV cervical 09/26/2012     H/O colposcopy with cervical biopsy 11/07/2012     S/P LEEP of cervix 01/23/2013       Past Surgical History:    Past Surgical History:   Procedure Laterality Date     LEEP TX, CERVICAL  01/23/2013    SUZAN 2 with free margins - [facility].     RELEASE CARPAL TUNNEL Right 10/5/2016    Procedure: RELEASE CARPAL TUNNEL;  Surgeon: Christian Sebastian MD;  Location: [facility] OR       Family History:    Family History   Problem Relation Age of Onset     Hypertension Father        Social History:  Marital Status:  Single [1]  Social History     Tobacco Use     Smoking status: Current Every Day Smoker     Packs/day: 0.25     Smokeless tobacco: Never Used   Substance Use Topics     Alcohol use: Yes     Alcohol/week: 0.0 oz     Comment: occ.     Drug use: No        Medications:   "    DULoxetine (CYMBALTA) 30 MG EC capsule   levothyroxine (SYNTHROID/LEVOTHROID) 88 MCG tablet         Review of Systems   All other systems reviewed and are negative.      Physical Exam   BP: (!) 117/100  Pulse: 102  Temp: 98.6  F (37  C)  Resp: 18  Height: 160 cm (5' 3\")  Weight: 84.4 kg (186 lb)  SpO2: 98 %      Physical Exam   Nursing note and vitals reviewed.  General alert cooperative female in mild distress.  Examination of her back is unremarkable.  Abdomen reveals active bowel sounds.  She is obese and does not localize for tenderness.  She has a resolving bruise over her right iliac crest.  On palpation there is no crepitus or step-off.  No pelvic pain with rocking.  No radicular leg pain.  No joint effusion on the right hip.    ED Course        Procedures           Results for orders placed or performed during the hospital encounter of 03/21/19   XR Pelvis 1/2 Views    Narrative    PELVIS ONE TO TWO VIEWS  3/21/2019 8:46 PM     COMPARISON: Two view abdomen 10/27/2015.    HISTORY: Contusion to right iliac crest.    FINDINGS: The visualized bones and joint spaces are within normal  limits.      Impression    IMPRESSION: No evidence for fracture, dislocation or significant  degenerative change of the pelvis or either hip on this single frontal  view.             Critical Care time:  none               Results for orders placed or performed during the hospital encounter of 03/21/19 (from the past 24 hour(s))   HCG qualitative urine (UPT)   Result Value Ref Range    HCG Qual Urine Negative NEG^Negative       Medications - No data to display  Pregnancy test was negative.  Pelvic x-ray showed no acute abnormality.  Assessments & Plan (with Medical Decision Making)   Leah Cox is a 28 year old female who presents to the emergency department for hip pain. Patient report injuring her right hip at work 4 days ago. She states she ran into a table with her right hip. She states it is painful and bruised.  " Incident occurred on Monday night.  She denies any pain in her back, down her leg or numbness or tingling. She states she called the nurse line tonight over concerns of nausea and blood in her stool. She states she had bright red blood in her stool.  With further query patient states that she had 2 drops of bright red blood on her stool.  Currently denies diffuse abdominal pain, nausea, vomiting, fever, chills, or diarrhea/dark stools.  Her last period was over 2 months ago.  She does not think she is pregnant and contributes this to her thyroid disease.  She denies other unusual bleeding or bruising.  She took ibuprofen but is not on any other antiplatelet therapy.  On exam she had a resolving bruise over the right iliac crest area.  She had a nontender abdomen.  Pelvic rocking did not cause pain.  No radicular leg pain.  No other skin bruises or lesions are noted.  Patency test was negative.  X-ray showed no acute abnormality.  Information on contusion is provided.  Reasons to return for reassessment discussed.                I have reviewed the nursing notes.    I have reviewed the findings, diagnosis, plan and need for follow up with the patient.          Medication List      ASK your doctor about these medications    naproxen 500 MG tablet  Commonly known as:  NAPROSYN  500 mg, Oral, 2 TIMES DAILY WITH MEALS  Ask about: Should I take this medication?            Final diagnoses:   Contusion of iliac crest, initial encounter     This document serves as a record of services personally performed by Joseluis Peraza MD. It was created on their behalf by Adore Weber, a trained medical scribe. The creation of this record is based on the provider's personal observations and the statements of the patient. This document has been checked and approved by the attending provider.    Note: Chart documentation done in part with Dragon Voice Recognition software. Although reviewed after completion, some word and grammatical errors may  remain.    3/21/2019   [facility] EMERGENCY DEPARTMENT     Joseluis Peraza MD  03/21/19 2101       Joseluis Peraza MD  03/21/19 2102

## 2019-03-22 NOTE — TELEPHONE ENCOUNTER
Pt called back, writer advised her to be seen in the ED.  She verbalized understanding and had no further questions.     Elma Ferris RN/FNA

## 2019-03-28 ENCOUNTER — TELEPHONE (OUTPATIENT)
Dept: FAMILY MEDICINE | Facility: CLINIC | Age: 29
End: 2019-03-28

## 2019-03-28 NOTE — TELEPHONE ENCOUNTER
Reason for call:  Patient reporting a symptom    Symptom or request: no period for 2 months     Duration (how long have symptoms been present): 2 months     Have you been treated for this before? No    Additional comments: Pt states her thyroid dose was increased, could that be why? She has taken home pregnancy tests that all have been negative.     Phone Number patient can be reached at:  Home number on file [phone] (home)    Best Time:  Any     Can we leave a detailed message on this number:  YES    Call taken on 3/28/2019 at 4:09 PM by Radha Larson

## 2019-03-28 NOTE — TELEPHONE ENCOUNTER
Called patient, per provider first available, appointment was offered for 4/18/19 and made.  Ok per patient, she stated understanding. ACase/MA

## 2019-03-28 NOTE — TELEPHONE ENCOUNTER
": 1990  PHONE #'s: [phone] (home) NONE (work)    PRESENTING PROBLEM:  No period for 2 months. Wondering why?  Has been under more stress lately, too.  She would like to see Alize Ragland CNP,  Tomorrow , if possible, or wonder if she would order tests for her thyroid to see if this is the cause of no period?      NURSING ASSESSMENT  Description:   \" I am not pregnant.  So wondering if this is from my Thyroid being off? I just started meds like in NOV for it. \"  Onset/duration:  2 months.   Precip. factors:  Etiology unknown  Assoc. Sx:  NO menses. Stressed out   Improves/worsens Sx:   same  Pain scale (1-10)   0/10  Sx specific meds:  Synthroid  88 mcg  LMP/preg/breast feedin19. Said it lasted almost 2 weeks.   Last exam/Tx:  Has NOT been seen for this.    RECOMMENDED DISPOSITION:  See within 2 weeks - She wants RN to send message on to Alize Ragland CNP, to see if she can see her tomorrow or if she will order some blood tests to figure out why no period for 2 months. Home pregnancy tests are negative.   Will comply with recommendation: [phone] (home) NONE (work)     If further questions/concerns or if Sx do not improve, worsen or new Sx develop, call your PCP or [facility] as soon as possible.    NOTES:  Disposition was determined by the first positive assessment question, therefore all previous assessment questions were negative.  Informed to check provider manual or call insurance company to assure coverage.    Guideline used: Menstrual Problems  Telephone Triage Protocols for Nurses, Fifth Edition, Jane Hargrove RN    "

## 2019-04-18 ENCOUNTER — OFFICE VISIT (OUTPATIENT)
Dept: FAMILY MEDICINE | Facility: CLINIC | Age: 29
End: 2019-04-18
Payer: COMMERCIAL

## 2019-04-18 VITALS
RESPIRATION RATE: 22 BRPM | HEIGHT: 63 IN | BODY MASS INDEX: 34.2 KG/M2 | HEART RATE: 82 BPM | TEMPERATURE: 97 F | OXYGEN SATURATION: 97 % | WEIGHT: 193 LBS | DIASTOLIC BLOOD PRESSURE: 62 MMHG | SYSTOLIC BLOOD PRESSURE: 110 MMHG

## 2019-04-18 DIAGNOSIS — N91.2 AMENORRHEA: ICD-10-CM

## 2019-04-18 DIAGNOSIS — E03.9 HYPOTHYROIDISM, UNSPECIFIED TYPE: ICD-10-CM

## 2019-04-18 DIAGNOSIS — Z00.00 WELL ADULT EXAM: Primary | ICD-10-CM

## 2019-04-18 LAB
FSH SERPL-ACNC: 8.3 IU/L
HCG SERPL QL: NEGATIVE
PROLACTIN SERPL-MCNC: 5 UG/L (ref 3–27)
T4 FREE SERPL-MCNC: 0.98 NG/DL (ref 0.76–1.46)
TSH SERPL DL<=0.005 MIU/L-ACNC: 7.23 MU/L (ref 0.4–4)

## 2019-04-18 PROCEDURE — 99395 PREV VISIT EST AGE 18-39: CPT | Performed by: NURSE PRACTITIONER

## 2019-04-18 PROCEDURE — 84703 CHORIONIC GONADOTROPIN ASSAY: CPT | Performed by: NURSE PRACTITIONER

## 2019-04-18 PROCEDURE — 83001 ASSAY OF GONADOTROPIN (FSH): CPT | Performed by: NURSE PRACTITIONER

## 2019-04-18 PROCEDURE — 99213 OFFICE O/P EST LOW 20 MIN: CPT | Mod: 25 | Performed by: NURSE PRACTITIONER

## 2019-04-18 PROCEDURE — 36415 COLL VENOUS BLD VENIPUNCTURE: CPT | Performed by: NURSE PRACTITIONER

## 2019-04-18 PROCEDURE — 84443 ASSAY THYROID STIM HORMONE: CPT | Performed by: NURSE PRACTITIONER

## 2019-04-18 PROCEDURE — 84439 ASSAY OF FREE THYROXINE: CPT | Performed by: NURSE PRACTITIONER

## 2019-04-18 PROCEDURE — 84146 ASSAY OF PROLACTIN: CPT | Performed by: NURSE PRACTITIONER

## 2019-04-18 ASSESSMENT — ENCOUNTER SYMPTOMS
HEADACHES: 1
NERVOUS/ANXIOUS: 1
HEMATURIA: 0
FREQUENCY: 1
DIZZINESS: 0
EYE PAIN: 0
CONSTIPATION: 1
ARTHRALGIAS: 1
ABDOMINAL PAIN: 1
HEMATOCHEZIA: 0
COUGH: 0
FEVER: 0
DIARRHEA: 1
CHILLS: 0
HEARTBURN: 1

## 2019-04-18 ASSESSMENT — MIFFLIN-ST. JEOR: SCORE: 1569.57

## 2019-04-18 NOTE — PROGRESS NOTES
SUBJECTIVE:   CC: Leah Cox is an 28 year old woman who presents for preventive health visit.     Healthy Habits:     Getting at least 3 servings of Calcium per day:  NO    Bi-annual eye exam:  Yes    Dental care twice a year:  Yes    Sleep apnea or symptoms of sleep apnea:  Daytime drowsiness and Excessive snoring    Diet:  Other    Frequency of exercise:  None    Taking medications regularly:  Yes    Medication side effects:  None    PHQ-2 Total Score: 2          Discuss thyroid, wondering if maybe a dosage change?  She states she has had no period for 3 months.  Last menstrual period was 1/13.  She also has developed weight gain, and feels bloated.  However, she does admit to poor dietary habits.  She has moved out of her sister's home, no longer feels overwhelming responsibility of taking care of her nephew.  She is happier, states she feels she probably is just doing little and eating too much the wrong things.  However, she has never had an issue of missing her period in the past.  She is sexually active.  She is on no contraception    Today's PHQ-2 Score:   PHQ-2 ( 1999 [employer]) 4/18/2019   Q1: Little interest or pleasure in doing things 1   Q2: Feeling down, depressed or hopeless 1   PHQ-2 Score 2   Q1: Little interest or pleasure in doing things Several days   Q2: Feeling down, depressed or hopeless Several days   PHQ-2 Score 2       Abuse: Current or Past(Physical, Sexual or Emotional)- No  Do you feel safe in your environment? Yes    Social History     Tobacco Use     Smoking status: Current Every Day Smoker     Packs/day: 0.25     Smokeless tobacco: Never Used   Substance Use Topics     Alcohol use: Yes     Alcohol/week: 0.0 oz     Comment: occ.         Alcohol Use 4/18/2019   Prescreen: >3 drinks/day or >7 drinks/week? No   Prescreen: >3 drinks/day or >7 drinks/week? -       Reviewed orders with patient.  Reviewed health maintenance and updated orders accordingly - Yes  BP Readings from Last  3 Encounters:   04/18/19 110/62   03/21/19 114/85   11/13/18 116/80    Wt Readings from Last 3 Encounters:   04/18/19 87.5 kg (193 lb)   03/21/19 84.4 kg (186 lb)   11/13/18 84.6 kg (186 lb 9.6 oz)                    Mammogram not appropriate for this patient based on age.    Pertinent mammograms are reviewed under the imaging tab.  History of abnormal Pap smear: NO - age 21-29 PAP every 3 years recommended  PAP / HPV Latest Ref Rng & Units 4/13/2018 12/8/2015   PAP - NIL NIL   HPV 16 DNA NEG:Negative Negative Negative   HPV 18 DNA NEG:Negative Negative Negative   OTHER HR HPV NEG:Negative Negative Negative     Reviewed and updated as needed this visit by clinical staff  Tobacco  Allergies  Meds  Med Hx  Surg Hx  Fam Hx  Soc Hx        Reviewed and updated as needed this visit by Provider        Past Medical History:   Diagnosis Date     ASCUS with positive high risk HPV cervical 09/26/2012     H/O colposcopy with cervical biopsy 11/07/2012    SUZAN 2-3     S/P LEEP of cervix 01/23/2013    SUZAN 2 with free margins      Past Surgical History:   Procedure Laterality Date     LEEP TX, CERVICAL  01/23/2013    SUZAN 2 with free margins - [facility].     RELEASE CARPAL TUNNEL Right 10/5/2016    Procedure: RELEASE CARPAL TUNNEL;  Surgeon: Christian Sebastian MD;  Location: [facility] OR     OB History   No data available       Review of Systems   Constitutional: Negative for chills and fever.   HENT: Negative for congestion, ear pain and hearing loss.    Eyes: Negative for pain.   Respiratory: Negative for cough.    Cardiovascular: Negative for chest pain.   Gastrointestinal: Positive for abdominal pain, constipation, diarrhea and heartburn. Negative for hematochezia.   Genitourinary: Positive for frequency. Negative for genital sores and hematuria.   Musculoskeletal: Positive for arthralgias.   Neurological: Positive for headaches. Negative for dizziness.   Psychiatric/Behavioral: The patient is nervous/anxious.      She  "has had recurrent symptoms of irritable bowel syndrome.  Has been into the ED with abdominal pain, workups have been negative for underlying pathology.    She has had some issues with depression and anxiety, states that has significantly improved since moving into her own place     OBJECTIVE:   /62   Pulse 82   Temp 97  F (36.1  C) (Temporal)   Resp 22   Ht 1.6 m (5' 3\")   Wt 87.5 kg (193 lb)   SpO2 97%   BMI 34.19 kg/m    Physical Exam  GENERAL: healthy, alert and no distress  EYES: Eyes grossly normal to inspection, PERRL and conjunctivae and sclerae normal  HENT: ear canals and TM's normal, nose and mouth without ulcers or lesions  NECK: no adenopathy, no asymmetry, masses, or scars and thyroid normal to palpation  RESP: lungs clear to auscultation - no rales, rhonchi or wheezes  CV: regular rate and rhythm, normal S1 S2, no S3 or S4, no murmur, click or rub, no peripheral edema and peripheral pulses strong  ABDOMEN: soft, nontender, no hepatosplenomegaly, no masses and bowel sounds normal  MS: no gross musculoskeletal defects noted, no edema  SKIN: no suspicious lesions or rashes  NEURO: Normal strength and tone, mentation intact and speech normal  PSYCH: mentation appears normal, affect normal/bright        ASSESSMENT/PLAN:       ICD-10-CM    1. Well adult exam Z00.00 T4 free     T4 free   2. Amenorrhea N91.2 HCG qualitative, Blood ([id number])     Prolactin     TSH with free T4 reflex     Follicle stimulating hormone   3. Hypothyroidism, unspecified type E03.9 TSH with free T4 reflex     Labs ordered as above for further evaluation of amenorrhea.  For further follow-up pending those results    COUNSELING:  Reviewed preventive health counseling, as reflected in patient instructions       Regular exercise       Healthy diet/nutrition       Osteoporosis Prevention/Bone Health       Safe sex practices/STD prevention    BP Readings from Last 1 Encounters:   04/18/19 110/62     Estimated body mass index is " "34.19 kg/m  as calculated from the following:    Height as of this encounter: 1.6 m (5' 3\").    Weight as of this encounter: 87.5 kg (193 lb).      Weight management plan: Discussed healthy diet and exercise guidelines We specifically discussed the quality and quantity of food that she is consuming.  Recommended that avoiding fast food, processed food and junk food.  Eat more fruits and vegetables, lean meats and whole grains.  We did compute her recommended caloric intake, which should be around 1100 cory/day for weight loss.  Also encouraged her to engage in exercise     reports that she has been smoking.  She has been smoking about 0.25 packs per day. She has never used smokeless tobacco.  Tobacco Cessation Action Plan: Information offered: Patient not interested at this time    Counseling Resources:  ATP IV Guidelines  Pooled Cohorts Equation Calculator  Breast Cancer Risk Calculator  FRAX Risk Assessment  ICSI Preventive Guidelines  Dietary Guidelines for Americans, 2010  USDA's MyPlate  ASA Prophylaxis  Lung CA Screening    NADJA Pierce CNP  [facility]  "

## 2019-04-19 DIAGNOSIS — E03.9 HYPOTHYROIDISM, UNSPECIFIED TYPE: Primary | ICD-10-CM

## 2019-04-19 RX ORDER — LEVOTHYROXINE SODIUM 100 UG/1
100 TABLET ORAL DAILY
Qty: 60 TABLET | Refills: 0 | Status: SHIPPED | OUTPATIENT
Start: 2019-04-19 | End: 2019-06-25

## 2019-05-21 ENCOUNTER — OFFICE VISIT (OUTPATIENT)
Dept: URGENT CARE | Facility: RETAIL CLINIC | Age: 29
End: 2019-05-21
Payer: COMMERCIAL

## 2019-05-21 VITALS — DIASTOLIC BLOOD PRESSURE: 75 MMHG | SYSTOLIC BLOOD PRESSURE: 109 MMHG | TEMPERATURE: 97.6 F

## 2019-05-21 DIAGNOSIS — B34.9 VIRAL SYNDROME: ICD-10-CM

## 2019-05-21 DIAGNOSIS — J02.9 ACUTE PHARYNGITIS, UNSPECIFIED ETIOLOGY: Primary | ICD-10-CM

## 2019-05-21 LAB — S PYO AG THROAT QL IA.RAPID: NORMAL

## 2019-05-21 PROCEDURE — 87081 CULTURE SCREEN ONLY: CPT | Performed by: FAMILY MEDICINE

## 2019-05-21 PROCEDURE — 99213 OFFICE O/P EST LOW 20 MIN: CPT | Performed by: FAMILY MEDICINE

## 2019-05-21 PROCEDURE — 87880 STREP A ASSAY W/OPTIC: CPT | Mod: QW | Performed by: FAMILY MEDICINE

## 2019-05-21 NOTE — LETTER
May 21, 2019      Leah Cox  [address]  [place] [address]        To Whom It May Concern:    Leah Cox was seen in our clinic. She may return to work after she has felt well for one day.      Sincerely,        Yong Alexander MD

## 2019-05-21 NOTE — PROGRESS NOTES
SUBJECTIVE:  Leah Cox is a 28 year old female with a chief complaint of sore throat.  Onset of symptoms was 3 day(s) ago.    Course of illness: gradual onset and still present.  Severity mild  Current and Associated symptoms: chills, body aches, fatigue and diarrhea  Treatment measures tried include Tylenol/Ibuprofen.  Predisposing factors include None.    Past Medical History:   Diagnosis Date     ASCUS with positive high risk HPV cervical 09/26/2012     H/O colposcopy with cervical biopsy 11/07/2012    SUZAN 2-3     S/P LEEP of cervix 01/23/2013    SUZAN 2 with free margins     Current Outpatient Medications   Medication Sig Dispense Refill     levothyroxine (SYNTHROID) 100 MCG tablet Take 1 tablet (100 mcg) by mouth daily 60 tablet 0     History   Smoking Status     Current Every Day Smoker     Packs/day: 0.25   Smokeless Tobacco     Never Used       ROS:  Review of systems negative except as stated above.    OBJECTIVE:   /75   Temp 97.6  F (36.4  C) (Tympanic)   GENERAL APPEARANCE: mild distress and cooperative  EYES: EOMI,  PERRL, conjunctiva clear  HENT: ear canals and TM's normal.  Nose normal.  Pharynx erythematous with some exudate noted.  NECK: supple, non-tender to palpation, no adenopathy noted  RESP: lungs clear to auscultation - no rales, rhonchi or wheezes  CV: regular rates and rhythm, normal S1 S2, no murmur noted  ABDOMEN:  soft, nontender, no HSM or masses and bowel sounds normal  SKIN: no suspicious lesions or rashes    Rapid Strep test is negative; await throat culture results.    ASSESSMENT:     Viral syndrome  Acute pharyngitis, unspecified etiology    PLAN:   Symptomatic treat with gargles, lozenges, and OTC analgesic as needed.   Follow-up with primary care provider if not improving.

## 2019-05-21 NOTE — PATIENT INSTRUCTIONS
"  Patient Education     Viral Syndrome (Adult)  A viral illness may cause a number of symptoms such as fever. Other symptoms depend on the part of the body that the virus affects. If it settles in your nose, throat, and lungs, it may cause cough, sore throat, congestion, runny nose, headache, earache and other ear symptoms, or shortness of breath. If it settles in your stomach and intestinal tract, it may cause nausea, vomiting, cramping, and diarrhea. Sometimes it causes generalized symptoms like \"aching all over,\" feeling tired, loss of energy, or loss of appetite.  A viral illness usually lasts anywhere from several days to several weeks, but sometimes it lasts longer. In some cases, a more serious infection can look like a viral syndrome in the first few days of the illness. You may need another exam and additional tests to know the difference. Watch for the warning signs listed below for when to seek medical advice.  Home care  Follow these guidelines for taking care of yourself at home:    If symptoms are severe, rest at home for the first 2 to 3 days.    Stay away from cigarette smoke - both your smoke and the smoke from others.    You may use over-the-counter acetaminophen or ibuprofen for fever, muscle aching, and headache, unless another medicine was prescribed for this. If you have chronic liver or kidney disease or ever had a stomach ulcer or gastrointestinal bleeding, talk with your healthcare provider before using these medicines. No one who is younger than 18 and ill with a fever should take aspirin. It may cause severe disease or death.    Your appetite may be poor, so a light diet is fine. Avoid dehydration by drinking 8 to 12, 8-ounce glasses of fluids each day. This may include water; orange juice; lemonade; apple, grape, and cranberry juice; clear fruit drinks; electrolyte replacement and sports drinks; and decaffeinated teas and coffee. If you have been diagnosed with a kidney disease, ask your " healthcare provider how much and what types of fluids you should drink to prevent dehydration. If you have kidney disease, drinking too much fluid can cause it build up in the your body and be dangerous to your health.    Over-the-counter remedies won't shorten the length of the illness but may be helpful for symptoms such as cough, sore throat, nasal and sinus congestion, or diarrhea. Don't use decongestants if you have high blood pressure.  Follow-up care  Follow up with your healthcare provider if you do not improve over the next week.  Call 911  Call 911 if any of the following occur:    Convulsion    Feeling weak, dizzy, or like you are going to faint    Chest pain, or more than mild shortness of breath  When to seek medical advice  Call your healthcare provider right away if any of these occur:    Cough with lots of colored sputum (mucus) or blood in your sputum    Chest pain, shortness of breath, wheezing, or trouble breathing    Severe headache; face, neck, or ear pain    Severe, constant pain in the lower right side of your belly (abdominal)    Continued vomiting (can t keep liquids down)    Frequent diarrhea (more than 5 times a day); blood (red or black color) or mucus in diarrhea    Feeling weak, dizzy, or like you are going to faint    Extreme thirst    Fever of 100.4 F (38 C) or higher, or as directed by your healthcare provider  Date Last Reviewed: 4/1/2018 2000-2018 [employer]. [address] [place] [id number]. All rights reserved. This information is not intended as a substitute for professional medical care. Always follow your healthcare professional's instructions.

## 2019-05-23 LAB
BACTERIA SPEC CULT: NORMAL
SPECIMEN SOURCE: NORMAL

## 2019-05-29 ENCOUNTER — TELEPHONE (OUTPATIENT)
Dept: FAMILY MEDICINE | Facility: CLINIC | Age: 29
End: 2019-05-29

## 2019-05-29 NOTE — TELEPHONE ENCOUNTER
Patient is due for a PHQ-9.  Index start date:2/12/2019  Index end date:6/12/2019    Please call patient.

## 2019-06-03 ASSESSMENT — PATIENT HEALTH QUESTIONNAIRE - PHQ9: SUM OF ALL RESPONSES TO PHQ QUESTIONS 1-9: 8

## 2019-06-03 NOTE — TELEPHONE ENCOUNTER
Pt completed PHQ-9.    PHQ-9 SCORE 6/3/2019   PHQ-9 Total Score 8     Sudha Do CMA ([facility])

## 2019-06-24 ENCOUNTER — TELEPHONE (OUTPATIENT)
Dept: FAMILY MEDICINE | Facility: CLINIC | Age: 29
End: 2019-06-24

## 2019-06-24 DIAGNOSIS — E03.9 HYPOTHYROIDISM, UNSPECIFIED TYPE: Primary | ICD-10-CM

## 2019-06-24 DIAGNOSIS — E03.9 HYPOTHYROIDISM, UNSPECIFIED TYPE: ICD-10-CM

## 2019-06-24 LAB
T4 FREE SERPL-MCNC: 1.04 NG/DL (ref 0.76–1.46)
TSH SERPL DL<=0.005 MIU/L-ACNC: 4.99 MU/L (ref 0.4–4)

## 2019-06-24 PROCEDURE — 84439 ASSAY OF FREE THYROXINE: CPT | Performed by: NURSE PRACTITIONER

## 2019-06-24 PROCEDURE — 84443 ASSAY THYROID STIM HORMONE: CPT | Performed by: NURSE PRACTITIONER

## 2019-06-24 PROCEDURE — 36415 COLL VENOUS BLD VENIPUNCTURE: CPT | Performed by: NURSE PRACTITIONER

## 2019-06-24 NOTE — TELEPHONE ENCOUNTER
Reason for Call:  Request for results:    Name of test or procedure: TSH    Date of test of procedure: 6/24    Location of the test or procedure: [facility]    OK to leave the result message on voice mail or with a family member? YES    Phone number Patient can be reached at:  Cell number on file:    Telephone Information:   Mobile [phone]       Additional comments:      Call taken on 6/24/2019 at 4:02 PM by Briana Ni

## 2019-06-25 RX ORDER — LEVOTHYROXINE SODIUM 100 UG/1
100 TABLET ORAL DAILY
Qty: 90 TABLET | Refills: 0 | Status: SHIPPED | OUTPATIENT
Start: 2019-06-25 | End: 2019-09-23

## 2019-06-25 NOTE — TELEPHONE ENCOUNTER
Tried to reach patient, voicemail is full so I was not able to leave a message.  Louann Multani, CMA

## 2019-06-25 NOTE — TELEPHONE ENCOUNTER
Her thyroid labs are very good, free t4 is completely normal and TSH is coming down. I would continue the same dose of 100mcg daily.

## 2019-06-25 NOTE — TELEPHONE ENCOUNTER
[place]ia returns call and is given message from Dr Givens as written.    She is in need of a refill on her thyroid medication, is out.   Please call to [facility], she will stop there this evening for pickup.     Also has concerns due to lack of menstruation. This was discussed with Alize Ragland at her visit 4/19.  She is scheduled with Dr Key Araujo at [facility] to consult on this. Previous referral was placed for [place].

## 2019-07-11 ENCOUNTER — TELEPHONE (OUTPATIENT)
Dept: OBGYN | Facility: OTHER | Age: 29
End: 2019-07-11

## 2019-07-11 NOTE — TELEPHONE ENCOUNTER
Yes, she can still be seen.  However, she can also reschedule for two weeks from now if she wants to wait and see a bit.

## 2019-07-11 NOTE — TELEPHONE ENCOUNTER
Attempt to contact patient.  No answer and voicemail box is full.  Will try back later.    Shabnam Baker, ADELFO  July 11, 2019

## 2019-07-11 NOTE — TELEPHONE ENCOUNTER
Reason for Call:  Other appointment    Detailed comments: Pt is scheduled to see you today at 3PM for lack of menses. Since booking the appt, she has gotten her period. She has been bleeding now for 17 days. It does seem to be getting lighter. She is wondering if she should still be seen? Please advise.     Phone Number Patient can be reached at: [phone]- this is her work # ask for her and they will page her    Best Time: any     Can we leave a detailed message on this number? YES    Call taken on 7/11/2019 at 11:10 AM by Radha Larson

## 2019-07-11 NOTE — TELEPHONE ENCOUNTER
Pt called back. She is going to wait and see how it goes and will call back to schedule if needed.  Thank you,  Radha Larson- Patient Representative

## 2019-09-16 ENCOUNTER — NURSE TRIAGE (OUTPATIENT)
Dept: FAMILY MEDICINE | Facility: CLINIC | Age: 29
End: 2019-09-16

## 2019-09-16 DIAGNOSIS — N92.1 MENORRHAGIA WITH IRREGULAR CYCLE: Primary | ICD-10-CM

## 2019-09-16 NOTE — TELEPHONE ENCOUNTER
"Nurse Triage SBAR    Situation: Leah Cox has questions about care advice given per protocol. Patient states she was referred to a specialist previously (in [place]) and requesting/needing  new referral.    Background: Irregular vaginal bleeding     Assessment: Patient reports irregular vaginal bleeding. She states she had a period from 8/15-8/26 stopping for three days and resuming after intercourse on 8/30 for 12 days. Pt reports eating low carb diet for past two weeks and losing 8 lbs and also recent dose adjustment to levothyroxine. Occasional sharp cramping pain \"in ovaries.\"   Pt soaking 4-5 pads per day, denies dizziness, lightheadedness, or weakness.     (See information below for more triage details.)    Recommendation: Routing to covering provider in Alize Ragland's absence for recommendation on referral to specialist or office visit with PCP.    Protocol Recommended Disposition: Renew referral as patient was previously unable to make an appointment due to bleeding.    Nurse Triage Follow Up: Patient informed of recommendations and reasons to call back or seek care in the Clinic. Patient verbalized understanding and agrees with the plan.      Additional Information    Negative: SEVERE vaginal bleeding (e.g., continuous red blood from vagina, or large blood clots) and very weak (can't stand)    Negative: Passed out (i.e., fainted, collapsed and was not responding)    Negative: Difficult to awaken or acting confused (e.g., disoriented, slurred speech)    Negative: Shock suspected (e.g., cold/pale/clammy skin, too weak to stand, low BP, rapid pulse)    Negative: Sounds like a life-threatening emergency to the triager    Negative: Pregnant > 20 weeks (5 months or more)    Negative: Pregnant < 20 weeks (less than 5 months)    Negative: Postpartum < 1 month since delivery ('Did you recently give birth?')    Negative: Vaginal discharge is the main symptom and bleeding is slight    Negative: SEVERE " abdominal pain (e.g., excruciating)    Negative: SEVERE dizziness (e.g., unable to stand, requires support to walk, feels like passing out now)    Negative: SEVERE vaginal bleeding (i.e., soaking 2 pads or tampons per hour and present 2 or more hours; 1 menstrual cup every 2 hours)    Negative: MODERATE vaginal bleeding (i.e., soaking pad or tampon per hour and present > 6 hours; 1 menstrual cup every 6 hours)    Negative: Pale skin (pallor) of new onset or worsening    Negative: Constant abdominal pain lasting > 2 hours    Negative: Patient sounds very sick or weak to the triager    Negative: Taking Coumadin (warfarin) or other strong blood thinner, or known bleeding disorder (e.g., thrombocytopenia)    Negative: Skin bruises or nosebleed and not caused by an injury    Negative: Bleeding/spotting after procedure (e.g., biopsy) or pelvic examination (e.g., pap smear) that persists > 3 days    Negative: Patient wants to be seen    Negative: Passed tissue (e.g., gray-white)    Negative: Periods with > 6 soaked pads or tampons per day    Periods last > 7 days    Protocols used: VAGINAL BLEEDING - [id number]    Marian Saeed RN on 9/16/2019 at 2:53 PM

## 2019-09-16 NOTE — TELEPHONE ENCOUNTER
Spoke with pt she would like to see OB/GYN in [place]. She would like to see a female provider. Referral pending.

## 2019-09-17 NOTE — TELEPHONE ENCOUNTER
I tried to call Virginia, no answer and mailbox is full . [facility] has a female GYN provider, Virginia can call [phone] to schedule this appointment.

## 2019-09-23 DIAGNOSIS — E03.9 HYPOTHYROIDISM, UNSPECIFIED TYPE: ICD-10-CM

## 2019-09-25 RX ORDER — LEVOTHYROXINE SODIUM 100 UG/1
TABLET ORAL
Qty: 90 TABLET | Refills: 0 | Status: SHIPPED | OUTPATIENT
Start: 2019-09-25 | End: 2019-12-23

## 2019-09-25 NOTE — TELEPHONE ENCOUNTER
"Routing refill request to provider for review/approval because:  Labs out of range:  TSH    Synthroid  Last Written Prescription Date:  6/25/2019  Last Fill Quantity: 90,  # refills: 0   Last office visit: 4/18/2019 with prescribing provider:  Ellyn   Future Office Visit:   Next 5 appointments (look out 90 days)    Oct 11, 2019 11:10 AM CDT  Office Visit with NADJA Calderon CNP  [facility] [address] [facility] [address] [address] [phone]           Requested Prescriptions   Pending Prescriptions Disp Refills     levothyroxine (SYNTHROID/LEVOTHROID) 100 MCG tablet [Pharmacy Med Name: LEVOTHYROXINE 100MCG TAB] 90 tablet 0     Sig: TAKE ONE TABLET BY MOUTH ONCE DAILY       Thyroid Protocol Failed - 9/23/2019  3:29 PM        Failed - Normal TSH on file in past 12 months     Recent Labs   Lab Test 06/24/19  1555   TSH 4.99*              Passed - Patient is 12 years or older        Passed - Recent (12 mo) or future (30 days) visit within the authorizing provider's specialty     Patient had office visit in the last 12 months or has a visit in the next 30 days with authorizing provider or within the authorizing provider's specialty.  See \"Patient Info\" tab in inbasket, or \"Choose Columns\" in Meds & Orders section of the refill encounter.              Passed - Medication is active on med list        Passed - No active pregnancy on record     If patient is pregnant or has had a positive pregnancy test, please check TSH.          Passed - No positive pregnancy test in past 12 months     If patient is pregnant or has had a positive pregnancy test, please check TSH.          Fely Moon RN on 9/25/2019 at 1:41 PM    "

## 2019-10-09 NOTE — PROGRESS NOTES
Subjective     Leah Cox is a 29 year old female who presents to clinic today for the following health issues:    HPI   Irregular vaginal bleeding    Patient has a long term history of irregular menstrual cycles. Her history is not completely clear, but at most, has gone 6 months with no menses. When she does get it after an extended period of amenorrhea, it is heavy and can sometimes last up to 2 months-flow starts heavy and progressively starts to slow. Often she will stop bleeding for a few days and then had a few more days of light bleeding. Does have heavy flow for at least 1 week, changes pads every 3-4 hours during this time. Passes small clots and has cramping.   Patient saw Dr. Farnsworth for this issue 12/2017 but did not want to pursue any of the evaluation recommendations discussed. She did have an endometrial polyp at the time.  Patient has a history of hypothyroidism, most recent TSH slightly elevated, but dose of medication was not changed.   Not currently trying for pregnancy, but would like to start thinking about this in the near future.   Has been on combined oral contraceptive pills for a short while in her early 20s. Denies headaches, vision changes, galactorrhea. Does not have cramping between cycles, no pain with intercourse.   Most recently, had a cycle from 8/15-8/26, no bleeding for a few days and then 12 additional days of bleeding. Also had an 8 lb weight loss in August.    Patient Active Problem List   Diagnosis     Irregular periods     Pelvic pain in female     Carpal tunnel syndrome of right wrist     Adjustment disorder with depressed mood     Excessive or frequent menstruation     Ganglion cyst of dorsum of right wrist     Metacarpal boss     Segmental dysfunction of sacral region     Bilateral low back pain with right-sided sciatica     Segmental dysfunction of lumbar region     Segmental dysfunction of thoracic region     S/P LEEP of cervix     Tobacco use disorder     Major  "depressive disorder, recurrent episode, moderate (H)     RENAY (generalized anxiety disorder)     Diarrhea, unspecified type     Abdominal pain, generalized     Hypothyroidism, unspecified type     Family dysfunction     Past Surgical History:   Procedure Laterality Date     LEEP TX, CERVICAL  01/23/2013    SUZAN 2 with free margins - Care Everywhere.     RELEASE CARPAL TUNNEL Right 10/5/2016    Procedure: RELEASE CARPAL TUNNEL;  Surgeon: Christian Sebastian MD;  Location:  OR       Social History     Tobacco Use     Smoking status: Current Every Day Smoker     Packs/day: 0.25     Types: Cigarettes     Smokeless tobacco: Never Used   Substance Use Topics     Alcohol use: Yes     Alcohol/week: 0.0 standard drinks     Comment: occ.     Family History   Problem Relation Age of Onset     Hypertension Father          Reviewed and updated as needed this visit by Provider         Review of Systems   ROS COMP: Constitutional, HEENT, cardiovascular, pulmonary, gi and gu systems are negative, except as otherwise noted.      Objective    BP (!) 144/80 (BP Location: Right arm, Patient Position: Sitting, Cuff Size: Adult Regular)   Pulse 85   Temp 97.8  F (36.6  C) (Oral)   Ht 1.6 m (5' 3\")   Wt 86 kg (189 lb 9.6 oz)   LMP 08/30/2019 (Exact Date)   SpO2 97%   BMI 33.59 kg/m    Body mass index is 33.59 kg/m .  Physical Exam   GENERAL: healthy, alert and no distress  ABDOMEN: soft, nontender, no hepatosplenomegaly, no masses and bowel sounds normal   (female): normal female external genitalia, normal urethral meatus, vaginal mucosa, normal cervix/adnexa/uterus without masses or discharge  MS: no gross musculoskeletal defects noted, no edema  SKIN: no suspicious lesions or rashes  PSYCH: mentation appears normal, affect normal/bright    Assessment & Plan     1. Metrorrhagia  See below  - TSH with free T4 reflex  - US Pelvic Complete with Transvaginal  - Follicle stimulating hormone  - Lutropin  - Prolactin    2. Excessive " and frequent menstruation  We discussed her history and possible etiologies. Plan labs and per below and discussed rationale. Recommend follow up appointment after labs and ultrasound results available to discuss management options. Patient does live near [place] and questions an OB GYN provider (female) that is closer to her and discussed providers at [facility].   - TSH with free T4 reflex  - US Pelvic Complete with Transvaginal  - Follicle stimulating hormone  - Lutropin  - Prolactin     NADJA Calderon CNP  [facility]

## 2019-10-11 ENCOUNTER — OFFICE VISIT (OUTPATIENT)
Dept: OBGYN | Facility: CLINIC | Age: 29
End: 2019-10-11
Payer: COMMERCIAL

## 2019-10-11 VITALS
TEMPERATURE: 97.8 F | WEIGHT: 189.6 LBS | OXYGEN SATURATION: 97 % | SYSTOLIC BLOOD PRESSURE: 144 MMHG | HEART RATE: 85 BPM | HEIGHT: 63 IN | DIASTOLIC BLOOD PRESSURE: 80 MMHG | BODY MASS INDEX: 33.59 KG/M2

## 2019-10-11 DIAGNOSIS — N92.0 EXCESSIVE AND FREQUENT MENSTRUATION: ICD-10-CM

## 2019-10-11 DIAGNOSIS — N92.1 METRORRHAGIA: Primary | ICD-10-CM

## 2019-10-11 LAB
FSH SERPL-ACNC: 8.9 IU/L
LH SERPL-ACNC: 12.2 IU/L
PROLACTIN SERPL-MCNC: 6 UG/L (ref 3–27)

## 2019-10-11 PROCEDURE — 99213 OFFICE O/P EST LOW 20 MIN: CPT | Performed by: NURSE PRACTITIONER

## 2019-10-11 PROCEDURE — 83001 ASSAY OF GONADOTROPIN (FSH): CPT | Performed by: NURSE PRACTITIONER

## 2019-10-11 PROCEDURE — 36415 COLL VENOUS BLD VENIPUNCTURE: CPT | Performed by: NURSE PRACTITIONER

## 2019-10-11 PROCEDURE — 84443 ASSAY THYROID STIM HORMONE: CPT | Performed by: NURSE PRACTITIONER

## 2019-10-11 PROCEDURE — 84146 ASSAY OF PROLACTIN: CPT | Performed by: NURSE PRACTITIONER

## 2019-10-11 PROCEDURE — 83002 ASSAY OF GONADOTROPIN (LH): CPT | Performed by: NURSE PRACTITIONER

## 2019-10-11 ASSESSMENT — PAIN SCALES - GENERAL: PAINLEVEL: NO PAIN (0)

## 2019-10-11 ASSESSMENT — MIFFLIN-ST. JEOR: SCORE: 1549.15

## 2019-10-12 LAB — TSH SERPL DL<=0.005 MIU/L-ACNC: 3.72 MU/L (ref 0.4–4)

## 2019-10-17 ENCOUNTER — HOSPITAL ENCOUNTER (OUTPATIENT)
Dept: ULTRASOUND IMAGING | Facility: CLINIC | Age: 29
Discharge: HOME | End: 2019-10-17
Attending: NURSE PRACTITIONER | Admitting: NURSE PRACTITIONER
Payer: COMMERCIAL

## 2019-10-17 PROCEDURE — 76830 TRANSVAGINAL US NON-OB: CPT

## 2019-10-21 ENCOUNTER — TELEPHONE (OUTPATIENT)
Dept: FAMILY MEDICINE | Facility: CLINIC | Age: 29
End: 2019-10-21

## 2019-10-21 NOTE — TELEPHONE ENCOUNTER
Patient informed of below results :    Notes recorded by Key Vivas MA on 10/21/2019 at 10:37 AM CDT  Tried to call Patient but mailbox is full.    Key Vivas MA on 10/21/2019 at 10:37 AM    ------    Notes recorded by Sofi Rosado APRN CNP on 10/20/2019 at 1:23 PM CDT  Please notify patient-her ultrasound is normal and no definite structural cause of her irregular cycles is seen. Recommend a follow up appointment to discuss in further detail and decide on management plan. Thank you. Sofi SAEZ CNP   2-3x/week

## 2019-12-11 ENCOUNTER — TELEPHONE (OUTPATIENT)
Dept: FAMILY MEDICINE | Facility: CLINIC | Age: 29
End: 2019-12-11

## 2019-12-11 ENCOUNTER — OFFICE VISIT (OUTPATIENT)
Dept: FAMILY MEDICINE | Facility: OTHER | Age: 29
End: 2019-12-11
Payer: OTHER MISCELLANEOUS

## 2019-12-11 VITALS
RESPIRATION RATE: 18 BRPM | TEMPERATURE: 98.7 F | DIASTOLIC BLOOD PRESSURE: 78 MMHG | HEART RATE: 88 BPM | SYSTOLIC BLOOD PRESSURE: 132 MMHG | WEIGHT: 186.7 LBS | BODY MASS INDEX: 33.07 KG/M2

## 2019-12-11 DIAGNOSIS — M53.3 SACROILIAC JOINT PAIN: Primary | ICD-10-CM

## 2019-12-11 PROCEDURE — 99203 OFFICE O/P NEW LOW 30 MIN: CPT | Performed by: INTERNAL MEDICINE

## 2019-12-11 RX ORDER — OMEGA-3 FATTY ACIDS/FISH OIL 300-1000MG
400 CAPSULE ORAL EVERY 4 HOURS PRN
COMMUNITY

## 2019-12-11 RX ORDER — PREDNISONE 50 MG/1
50 TABLET ORAL DAILY
Qty: 5 TABLET | Refills: 0 | Status: SHIPPED | OUTPATIENT
Start: 2019-12-11 | End: 2020-10-09

## 2019-12-11 RX ORDER — TIZANIDINE HYDROCHLORIDE 4 MG/1
4 CAPSULE, GELATIN COATED ORAL AT BEDTIME
Qty: 7 CAPSULE | Refills: 0 | Status: SHIPPED | OUTPATIENT
Start: 2019-12-11 | End: 2021-10-15

## 2019-12-11 ASSESSMENT — PAIN SCALES - GENERAL: PAINLEVEL: SEVERE PAIN (6)

## 2019-12-11 NOTE — TELEPHONE ENCOUNTER
"Reason for Call:  Other Question about visit    Detailed comments: Pt called stating she can't remember what she was told she \"tore\" in her back and will need to clarify with employer. Please call to advise.    Phone Number Patient can be reached at: Home number on file [phone] (home)    Best Time: Anytime    Can we leave a detailed message on this number? YES    Call taken on 12/11/2019 at 10:36 AM by Nicki Wei      "

## 2019-12-11 NOTE — LETTER
[facility]  [address] [facility] [address]  Phone: [phone]    December 11, 2019        Leah Cox  [address] [facility] [address]          To whom it may concern:    RE: Leah Cox    Patient may return to work 12/11/19 with the following:  No lifting >10 pounds for 1 week    Please contact me for questions or concerns.      Sincerely,        Danny Rodriguez, DO

## 2019-12-11 NOTE — TELEPHONE ENCOUNTER
Called and LM for patient to call back. Please relay message below to patient.     Namrata Lane MA

## 2019-12-11 NOTE — TELEPHONE ENCOUNTER
"The patient did not \"tear\" anything.  She strained her sacroiliac joint/ligaments.  With treatment, they will get better.  Without treatment, they will get better.  Recommend the medication as suggested in combination with moist heat and limiting bending, twisting, and lifting for about a week.    [place]  "

## 2019-12-11 NOTE — TELEPHONE ENCOUNTER
Reason for Call:  Same Day Appointment, Requested Provider:  ANY     PCP: Alize Ragland    Reason for visit: back injury- work comp- injury happened Monday     Duration of symptoms: Monday     Have you been treated for this in the past? No    Additional comments: Pt would like to be seen today. Please call.     Can we leave a detailed message on this number? YES    Phone number patient can be reached at: [phone]    Best Time: any     Call taken on 12/11/2019 at 7:19 AM by Radha Larson

## 2019-12-11 NOTE — TELEPHONE ENCOUNTER
Tried to call patient at work number listed for contacting her today, initally was transferred to another number after asking for patient.  Unable to reach as phone rang and rang with no answer. Will try again.  Manuel     tried to call again, I was informed that she has declined needing to be seen.  Manuel

## 2019-12-11 NOTE — PROGRESS NOTES
Subjective     Leah Cox is a 29 year old female who presents to clinic today for the following health issues:    HPI   Back Pain       Duration: DOI was Monday 12/9/19, pain started yesterday        Specific cause: lifting, turning/bending, work-related    Description:   Location of pain: low back right  Character of pain: sharp and burning  Pain radiation: yesterday into leg  New numbness or weakness in legs, not attributed to pain:  no     Intensity: Currently 6/10    History:   Pain interferes with job: No  History of back problems: sciatica   Sees a specialist for back pain:  No  Therapies tried without relief: None            Accompanying Signs & Symptoms:  Risk of Fracture:  None  Risk of Cauda Equina:  None  Risk of Infection:  None  Risk of Cancer:  None  Risk of Ankylosing Spondylitis:  Onset at age <35, male, AND morning back stiffness. 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  It was a sharp stabbing discomfort which was localized.  Over the subsequent 2 days, this is become somewhat more sciatic and is now running into her right buttocks but not down her leg.  It is worse with motion or bending.  It is easily reproducible with palpation of the posterior superior iliac notch.  She has no prior history of back problems of this nature.  She does have a history of thoracic and sacral somatic dysfunction which has been previously resolved with chiropractic manipulative therapy.                         PAST, FAMILY,SOCIAL HISTORY:     Medical  History:   has a past medical history of ASCUS with positive high risk HPV cervical (09/26/2012), H/O colposcopy with cervical biopsy (11/07/2012), and S/P LEEP of cervix (01/23/2013).     Surgical History:   has a past surgical history that includes leep tx, cervical (01/23/2013)  and Release carpal tunnel (Right, 10/5/2016).     Social History:   reports that she has been smoking cigarettes. She has been smoking about 0.25 packs per day. She has never used smokeless tobacco. She reports current alcohol use. She reports that she does not use drugs.     Family History:  family history includes Hypertension in her father.            MEDICATIONS  Current Outpatient Medications   Medication Sig Dispense Refill     ibuprofen (ADVIL/MOTRIN) 200 MG capsule Take 400 mg by mouth every 4 hours as needed for fever       levothyroxine (SYNTHROID/LEVOTHROID) 100 MCG tablet TAKE ONE TABLET BY MOUTH ONCE DAILY 90 tablet 0     predniSONE (DELTASONE) 50 MG tablet Take 1 tablet (50 mg) by mouth daily 5 tablet 0     tiZANidine (ZANAFLEX) 4 MG capsule Take 1 capsule (4 mg) by mouth At Bedtime 7 capsule 0         --------------------------------------------------------------------------------------------------------------------                              Review of Systems       LUNGS: Pt denies: cough, excess sputum, hemoptysis, or shortness of breath.   HEART: Pt denies: chest pain, arrhythmia, syncope, tachy or bradyarrhythmia.   GI: Pt denies: nausea, vomiting, diarrhea, constipation, melena, or hematochezia.   NEURO: Pt denies: seizures, strokes, diplopia, weakness, paraesthesias, or paralysis.   SKIN: Pt denies: itching, rashes, discoloration, or specific lesions of concern. Denies recent hair loss.   PSYCH: The patient denies significant depression, anxiety, mood imbalance. Specifically denies any suicidal ideation.                                     Examination    /78 (BP Location: Right arm, Patient Position: Sitting, Cuff Size: Adult Large)   Pulse 88   Temp 98.7  F (37.1  C) (Temporal)   Resp 18   Wt 84.7 kg (186 lb 11.2 oz)   BMI 33.07 kg/m       Constitutional: The patient appears to be in no acute distress. The patient appears to be adequately hydrated. No acute respiratory or  hemodynamic distress is noted at this time.   LUNGS: clear bilaterally, airflow is brisk, no intercostal retraction or stridor is noted. No coughing is noted during visit.   HEART:  regular without rubs, clicks, gallops, or murmurs. PMI is nondisplaced. Upstrokes are brisk. S1,S2 are heard.   GI: Abdomen is soft, without rebound, guarding or tenderness. Bowel sounds are appropriate. No renal bruits are heard.   NEURO: Pt is alert and appropriate. No neurologic lateralization is noted. Cranial nerves 2-12 are intact. Peripheral sensory and motor function are grossly normal.    SKIN:  warm and dry. No erythema, or rashes are noted. No specific lesions of concern are noted.    PSYCH: The patient appears grossly appropriate. Maintains good eye contact, does not have any jittery or atypical motion. Displays appropriate affect.   MS: Minimal crepitance is noted in the extremities. No deformity is present. Muscle strength is appropriate and equal bilaterally. No acute joint erythema or swelling is present.  Easily reproducible discomfort is noted with palpation sacroiliac notch on the right.  No evidence of neurologic compromise such as foot drop or weakness on heel and toe walking is noted.  Straight leg raising is negative.                                             Decision Making    1. Sacroiliac joint pain  Patient is extremely hesitant to consider arthrocentesis.  Will recommend a short course of prednisone and at bedtime tizanidine in combination with moist heat and rest.  Patient is able to return to work but I would recommend limit bending and lifting to 10 pounds.  Instructed patient on lifting techniques whereas if she does not bend and twist simultaneously.  - predniSONE (DELTASONE) 50 MG tablet; Take 1 tablet (50 mg) by mouth daily  Dispense: 5 tablet; Refill: 0  - tiZANidine (ZANAFLEX) 4 MG capsule; Take 1 capsule (4 mg) by mouth At Bedtime  Dispense: 7 capsule; Refill: 0                           FOLLOW  UP   I have asked the patient to make an appointment for followup with me as needed.  She will follow-up with her primary care provider for her routine health care, maintenance, screening,.        I have carefully explained the diagnosis and treatment options to the patient.  The patient has displayed an understanding of the above, and all subsequent questions were answered.      DO LAURI Choudhury    Portions of this note were produced using [employer]  Although every attempt at real-time proof reading has been made, occasional grammar/syntax errors may have been missed.

## 2019-12-12 ENCOUNTER — TELEPHONE (OUTPATIENT)
Dept: FAMILY MEDICINE | Facility: CLINIC | Age: 29
End: 2019-12-12

## 2019-12-12 NOTE — TELEPHONE ENCOUNTER
Reason for call:  Patient reporting a symptom    Symptom or request: Stomach burning, pt thinks its from taking tylenol. Pt states she took it with food, 400mg at 7am and then 400mg at 12:30. Would like a call from a nurse to discuss.     Duration (how long have symptoms been present): 1 day    Have you been treated for this before? No    Additional comments: Pt states she did take 1/2 of tiZANidine (ZANAFLEX) 4 MG capsule at bedtime last night.     Phone Number patient can be reached at:  Home number on file [phone] (home)    Best Time:      Can we leave a detailed message on this number:  YES    Call taken on 12/12/2019 at 3:36 PM by Adelita Spencer

## 2019-12-12 NOTE — TELEPHONE ENCOUNTER
Per Dr. Ni, she can take tums. She can use heat and ice for pain as needed.     Patient notified.     Radha Burroughs RN on 12/12/2019 at 4:30 PM

## 2019-12-12 NOTE — TELEPHONE ENCOUNTER
Patient states her stomach is burning after taking tylenol. She is taking it with food. She states she has only had this stomach burning when she takes tylenol.   She also states she has warm hot cheeks. She is wondering if she is reacting to the tylenol or if it could be interacting with her thyroid medication?    She has not had this with ibuprofen but she was told not to take the ibuprofen while on the prednisone.  She denies chest pain, and denies any difficulty breathing.  She does state she currently has warm cheeks.    PCP is out.  Please advise.    Radha Burroughs RN on 12/12/2019 at 4:08 PM

## 2019-12-21 DIAGNOSIS — E03.9 HYPOTHYROIDISM, UNSPECIFIED TYPE: ICD-10-CM

## 2019-12-23 RX ORDER — LEVOTHYROXINE SODIUM 100 UG/1
TABLET ORAL
Qty: 90 TABLET | Refills: 1 | Status: SHIPPED | OUTPATIENT
Start: 2019-12-23 | End: 2020-06-12

## 2019-12-23 NOTE — TELEPHONE ENCOUNTER
"Requested Prescriptions   Pending Prescriptions Disp Refills     levothyroxine (SYNTHROID/LEVOTHROID) 100 MCG tablet [Pharmacy Med Name: LEVOTHYROXINE 100MCG TAB] 90 tablet 0     Sig: TAKE ONE TABLET BY MOUTH ONCE DAILY   Last Written Prescription Date:  9/25/19  Last Fill Quantity: 90,  # refills: 0   Last office visit: 12/11/2019 with prescribing provider:  Michael   Future Office Visit:   Next 5 appointments (look out 90 days)    Jan 03, 2020  1:45 PM CST  Office Visit with Maddy Farnsworth,   [facility] [address] [facility] [address]  [phone]   Jan 06, 2020  2:40 PM CST  [facility] [address] [facility] [address]  [phone]             Thyroid Protocol Passed - 12/21/2019  2:49 PM        Passed - Patient is 12 years or older        Passed - Recent (12 mo) or future (30 days) visit within the authorizing provider's specialty     Patient has had an office visit with the authorizing provider or a provider within the authorizing providers department within the previous 12 mos or has a future within next 30 days. See \"Patient Info\" tab in inbasket, or \"Choose Columns\" in Meds & Orders section of the refill encounter.              Passed - Medication is active on med list        Passed - Normal TSH on file in past 12 months     Recent Labs   Lab Test 10/11/19  1135   TSH 3.72            Passed - No active pregnancy on record     If patient is pregnant or has had a positive pregnancy test, please check TSH.          Passed - No positive pregnancy test in past 12 months     If patient is pregnant or has had a positive pregnancy test, please check TSH.            "

## 2019-12-23 NOTE — TELEPHONE ENCOUNTER
Prescription approved per [facility] Refill Protocol.    Radha Burroughs RN on 12/23/2019 at 2:58 PM

## 2019-12-26 ENCOUNTER — HOSPITAL ENCOUNTER (OUTPATIENT)
Dept: GENERAL RADIOLOGY | Facility: CLINIC | Age: 29
Discharge: HOME | End: 2019-12-26
Attending: FAMILY MEDICINE | Admitting: FAMILY MEDICINE
Payer: OTHER MISCELLANEOUS

## 2019-12-26 ENCOUNTER — OFFICE VISIT (OUTPATIENT)
Dept: FAMILY MEDICINE | Facility: CLINIC | Age: 29
End: 2019-12-26
Payer: COMMERCIAL

## 2019-12-26 ENCOUNTER — HOSPITAL ENCOUNTER (OUTPATIENT)
Dept: GENERAL RADIOLOGY | Facility: CLINIC | Age: 29
End: 2019-12-26
Attending: FAMILY MEDICINE
Payer: OTHER MISCELLANEOUS

## 2019-12-26 VITALS
WEIGHT: 188.3 LBS | TEMPERATURE: 97.1 F | OXYGEN SATURATION: 99 % | DIASTOLIC BLOOD PRESSURE: 86 MMHG | SYSTOLIC BLOOD PRESSURE: 132 MMHG | HEIGHT: 63 IN | HEART RATE: 99 BPM | BODY MASS INDEX: 33.36 KG/M2 | RESPIRATION RATE: 16 BRPM

## 2019-12-26 DIAGNOSIS — M54.41 ACUTE RIGHT-SIDED LOW BACK PAIN WITH RIGHT-SIDED SCIATICA: Primary | ICD-10-CM

## 2019-12-26 DIAGNOSIS — M54.41 ACUTE RIGHT-SIDED LOW BACK PAIN WITH RIGHT-SIDED SCIATICA: ICD-10-CM

## 2019-12-26 PROCEDURE — 73502 X-RAY EXAM HIP UNI 2-3 VIEWS: CPT | Mod: TC

## 2019-12-26 PROCEDURE — 72100 X-RAY EXAM L-S SPINE 2/3 VWS: CPT | Mod: TC

## 2019-12-26 PROCEDURE — 99213 OFFICE O/P EST LOW 20 MIN: CPT | Performed by: FAMILY MEDICINE

## 2019-12-26 RX ORDER — PREDNISONE 50 MG/1
50 TABLET ORAL DAILY
Qty: 10 TABLET | Refills: 0 | Status: SHIPPED | OUTPATIENT
Start: 2019-12-26 | End: 2020-10-09

## 2019-12-26 ASSESSMENT — MIFFLIN-ST. JEOR: SCORE: 1543.25

## 2019-12-26 NOTE — PROGRESS NOTES
"Subjective     Leah Cox is a 29 year old female who presents to clinic today for the following health issues:    HPI   Back Pain       Duration: DOI was Monday 12/9/19        Specific cause:lifting, turning/bending, work-related    Description:   Location of pain: low back right  Character of pain: burning   Pain radiation:none and Radiates into \"hip bone\"  New numbness or weakness in legs, not attributed to pain:  no     Intensity: Currently 9/10    History:   Pain interferes with job: YES  History of back problems: Patient had a sciatic nerve issue a few years back   Any previous MRI or X-rays: Patient is unsure.   Sees a specialist for back pain:  No  Therapies tried without relief: NA    Alleviating factors:   Improved by: cold and heat alternating, Prednisone, muscle relaxer.       Precipitating factors:  Worsened by: Lifting, Bending, Standing, Sitting, Lying Flat and Walking          Accompanying Signs & Symptoms:    SUBJECTIVE:  Virginia  is a 29 year old female who presents for: Follow-up of back injury at work.  She was initially seen on 12/11/2019 the injury occurred on 12/9/2019.  She was lifting wooden panel at work.  Noticed a burning pain that started several days later.  Low back on the right radiates to her hip area.  She was back at work with work restrictions of 10 pounds lifting and pushing and pulling was given some prednisone.  But then she had a setback.  She states she could hardly get out of bed earlier this week.    Past Medical History:   Diagnosis Date     ASCUS with positive high risk HPV cervical 09/26/2012     H/O colposcopy with cervical biopsy 11/07/2012    SUZAN 2-3     S/P LEEP of cervix 01/23/2013    SUZAN 2 with free margins     Past Surgical History:   Procedure Laterality Date     LEEP TX, CERVICAL  01/23/2013    SUZAN 2 with free margins - Care Everywhere.     RELEASE CARPAL TUNNEL Right 10/5/2016    Procedure: RELEASE CARPAL TUNNEL;  Surgeon: Christian Sebastian MD;  " "Location: [facility] OR     Social History     Tobacco Use     Smoking status: Current Every Day Smoker     Packs/day: 0.25     Types: Cigarettes     Smokeless tobacco: Never Used   Substance Use Topics     Alcohol use: Yes     Alcohol/week: 0.0 standard drinks     Comment: occ.     Current Outpatient Medications   Medication Sig Dispense Refill     ibuprofen (ADVIL/MOTRIN) 200 MG capsule Take 400 mg by mouth every 4 hours as needed for fever       levothyroxine (SYNTHROID/LEVOTHROID) 100 MCG tablet TAKE ONE TABLET BY MOUTH ONCE DAILY 90 tablet 1     predniSONE (DELTASONE) 50 MG tablet Take 1 tablet (50 mg) by mouth daily 10 tablet 0     predniSONE (DELTASONE) 50 MG tablet Take 1 tablet (50 mg) by mouth daily (Patient not taking: Reported on 12/26/2019) 5 tablet 0     tiZANidine (ZANAFLEX) 4 MG capsule Take 1 capsule (4 mg) by mouth At Bedtime (Patient not taking: Reported on 12/26/2019) 7 capsule 0       REVIEW OF SYSTEMS:   5 point ROS negative except as noted above in HPI, including Gen., Resp, CV, GI &  system review.     OBJECTIVE:  Vitals: /86 (BP Location: Right arm, Patient Position: Sitting, Cuff Size: Adult Large)   Pulse 99   Temp 97.1  F (36.2  C) (Temporal)   Resp 16   Ht 1.6 m (5' 3\")   Wt 85.4 kg (188 lb 4.8 oz)   LMP 11/06/2019 (Approximate)   SpO2 99%   BMI 33.36 kg/m    BMI= Body mass index is 33.36 kg/m .  She is alert appears in moderate discomfort.  Tender in the right lower sacroiliac area.  Straining up from sitting is a bit of a challenge.  X-rays are taken of the pelvis hip and lumbar spine and are normal.    ASSESSMENT:  Acute right-sided low back pain with right-sided sciatica    PLAN:  Have her off work for several more days.  Then she can return with the same restrictions.  We will follow-up in a week.        Sanya Ni MD  [facility]            "

## 2019-12-26 NOTE — LETTER
REPORT OF WORK COMP    [address] [address]  [phone]      PATIENT DATA    Employee Name: Leah Cox      : 1990     #: [id number]    Work related injury: Yes  Employer at time of injury: [employer]   Employer contact & phone: Pat Thurman [phone] ext [phone]  Employed elsewhere? No  Workers' Compensation Carrier/Managed Care Plan:      Today's date: 2019  Date of injury: 2019  Date of first visit: 19    PROVIDER EVALUATION: Please fill in as needed.  Please give copy to employee for employer.    1. Diagnosis: Sacroiliac joint pain     2. Treatment: Rest ice and heat.  3. Medication: Prednisone  NOTE: When ordering a medication, [place] Rules require Work Comp or WC on prescriptions.    4. No work from 19 to 2019.  5. Return to work date: 2019  ** WITH RESTRICTIONS? Yes, with work restrictions: Restricted lifting pushing and pulling to 10 pounds force DURATION OF LIMITATIONS: Through January 3, 2020.      RESTRICTIONS: Unlimited unless listed.  Restrictions apply to home and leisure also.  If work restrictions is not available, the employee is totally disabled.    Maximum Medical Improvement (Date): To be determined  Any Permanent Partial Disability? Deferred to future exam/consult.    Provider comments: Had a setback.  Could hardly get out of bed earlier this week.  We will have her off work for the next several days with ice rest and and restart some prednisone.    Medical Examiner: Sanya Ni MD            License or registration: [id number]    Next appointment: 1 week    CC: Employer, Managed Care Plan/Payor, Patient

## 2019-12-31 DIAGNOSIS — Z32.00 PREGNANCY EXAMINATION OR TEST, PREGNANCY UNCONFIRMED: Primary | ICD-10-CM

## 2020-01-06 ENCOUNTER — OFFICE VISIT (OUTPATIENT)
Dept: FAMILY MEDICINE | Facility: CLINIC | Age: 30
End: 2020-01-06
Payer: OTHER MISCELLANEOUS

## 2020-01-06 VITALS
OXYGEN SATURATION: 96 % | DIASTOLIC BLOOD PRESSURE: 84 MMHG | HEART RATE: 95 BPM | TEMPERATURE: 97.8 F | BODY MASS INDEX: 33.49 KG/M2 | HEIGHT: 63 IN | SYSTOLIC BLOOD PRESSURE: 116 MMHG | WEIGHT: 189 LBS | RESPIRATION RATE: 18 BRPM

## 2020-01-06 DIAGNOSIS — M54.41 ACUTE BILATERAL LOW BACK PAIN WITH RIGHT-SIDED SCIATICA: Primary | ICD-10-CM

## 2020-01-06 PROCEDURE — 99213 OFFICE O/P EST LOW 20 MIN: CPT | Performed by: FAMILY MEDICINE

## 2020-01-06 ASSESSMENT — MIFFLIN-ST. JEOR: SCORE: 1546.43

## 2020-01-06 NOTE — PROGRESS NOTES
Subjective     Leah Cox is a 29 year old female who presents to clinic today for the following health issues:    HPI     Patient has been going to the chiropractor. They did US therapy and that has seemed to help. She would like to be taken off of restrictions.     Back Pain       Duration: DOI was Monday 12/9/19        Specific cause: lifting, turning/bending, work-related    Description:   Location of pain: low back right  Character of pain: No pain today  Pain radiation:none  New numbness or weakness in legs, not attributed to pain:  no     Intensity: Currently 0/10    History:   Pain interferes with job: YES  History of back problems: no prior back problems, sciatica   Any previous MRI or X-rays: None  Sees a specialist for back pain:  No  Therapies tried without relief: none    Alleviating factors:   Improved by: chiropractor.       Precipitating factors:  Worsened by: Nothing          Accompanying Signs & Symptoms:    SUBJECTIVE:  Virginia  is a 29 year old female who presents for: Follow-up of her low back injury at work.  It is been about a month now.  She went to the chiropractor and is feeling much better.  Pretty much no pain anymore.  She would like to return to work without any restrictions.    Past Medical History:   Diagnosis Date     ASCUS with positive high risk HPV cervical 09/26/2012     H/O colposcopy with cervical biopsy 11/07/2012    SUZAN 2-3     S/P LEEP of cervix 01/23/2013    SUZAN 2 with free margins     Past Surgical History:   Procedure Laterality Date     LEEP TX, CERVICAL  01/23/2013    SUZAN 2 with free margins - Care Everywhere.     RELEASE CARPAL TUNNEL Right 10/5/2016    Procedure: RELEASE CARPAL TUNNEL;  Surgeon: Christian Sebastian MD;  Location:  OR     Social History     Tobacco Use     Smoking status: Current Every Day Smoker     Packs/day: 0.25     Types: Cigarettes     Smokeless tobacco: Never Used   Substance Use Topics     Alcohol use: Yes     Alcohol/week: 0.0  "standard drinks     Comment: occ.     Current Outpatient Medications   Medication Sig Dispense Refill     ibuprofen (ADVIL/MOTRIN) 200 MG capsule Take 400 mg by mouth every 4 hours as needed for fever       levothyroxine (SYNTHROID/LEVOTHROID) 100 MCG tablet TAKE ONE TABLET BY MOUTH ONCE DAILY 90 tablet 1     predniSONE (DELTASONE) 50 MG tablet Take 1 tablet (50 mg) by mouth daily (Patient not taking: Reported on 1/6/2020) 10 tablet 0     predniSONE (DELTASONE) 50 MG tablet Take 1 tablet (50 mg) by mouth daily (Patient not taking: Reported on 12/26/2019) 5 tablet 0     tiZANidine (ZANAFLEX) 4 MG capsule Take 1 capsule (4 mg) by mouth At Bedtime (Patient not taking: Reported on 12/26/2019) 7 capsule 0       REVIEW OF SYSTEMS:   5 point ROS negative except as noted above in HPI, including Gen., Resp, CV, GI &  system review.     OBJECTIVE:  Vitals: /84 (BP Location: Right arm, Patient Position: Sitting, Cuff Size: Adult Large)   Pulse 95   Temp 97.8  F (36.6  C) (Temporal)   Resp 18   Ht 1.6 m (5' 3\")   Wt 85.7 kg (189 lb)   SpO2 96%   BMI 33.48 kg/m    BMI= Body mass index is 33.48 kg/m .  She is alert appears well.  Spine is straight.  No tenderness to palpation no swelling noted in the lower lumbar region especially on the right where she was painful.  Gait is regular.    ASSESSMENT:  Resolved lumbar back sprain    PLAN:  Return to work with no restrictions has reached maximal medical improvement with 0 permanent partial disability.        Sanya Ni MD  [facility]            "

## 2020-06-10 DIAGNOSIS — E03.9 HYPOTHYROIDISM, UNSPECIFIED TYPE: ICD-10-CM

## 2020-06-12 RX ORDER — LEVOTHYROXINE SODIUM 100 UG/1
TABLET ORAL
Qty: 90 TABLET | Refills: 1 | Status: SHIPPED | OUTPATIENT
Start: 2020-06-12 | End: 2020-12-16

## 2020-06-12 NOTE — TELEPHONE ENCOUNTER
"Levothyroxine  Last Written Prescription Date:  12/23/19  Last Fill Quantity: 90,  # refills: 1   Last office visit: 1/6/2020 with prescribing provider:  Dr. Mir Ni   Future Office Visit:  NONE  Requested Prescriptions   Pending Prescriptions Disp Refills     levothyroxine (SYNTHROID/LEVOTHROID) 100 MCG tablet [Pharmacy Med Name: LEVOTHYROXINE 100MCG TAB] 90 tablet 1     Sig: TAKE ONE TABLET BY MOUTH ONCE DAILY       Thyroid Protocol Passed - 6/10/2020  5:02 AM        Passed - Patient is 12 years or older        Passed - Recent (12 mo) or future (30 days) visit within the authorizing provider's specialty     Patient has had an office visit with the authorizing provider or a provider within the authorizing providers department within the previous 12 mos or has a future within next 30 days. See \"Patient Info\" tab in inbasket, or \"Choose Columns\" in Meds & Orders section of the refill encounter.            Passed - Medication is active on med list        Passed - Normal TSH on file in past 12 months     Recent Labs   Lab Test 10/11/19  1135   TSH 3.72            Passed - No active pregnancy on record     If patient is pregnant or has had a positive pregnancy test, please check TSH.        Passed - No positive pregnancy test in past 12 months     If patient is pregnant or has had a positive pregnancy test, please check TSH.           Will forward RX to provider as Alize Ragland no longer works at [employer]. She has retired. This patient needs to pick new PCP. Her Last TSH ws checked in Jan and is WNL.  NADIA Siddiqui  "

## 2020-10-09 ENCOUNTER — OFFICE VISIT (OUTPATIENT)
Dept: FAMILY MEDICINE | Facility: CLINIC | Age: 30
End: 2020-10-09
Payer: COMMERCIAL

## 2020-10-09 VITALS
BODY MASS INDEX: 32.65 KG/M2 | RESPIRATION RATE: 18 BRPM | SYSTOLIC BLOOD PRESSURE: 126 MMHG | DIASTOLIC BLOOD PRESSURE: 84 MMHG | TEMPERATURE: 98.1 F | WEIGHT: 184.31 LBS | HEART RATE: 116 BPM | OXYGEN SATURATION: 97 %

## 2020-10-09 DIAGNOSIS — Z71.6 ENCOUNTER FOR SMOKING CESSATION COUNSELING: ICD-10-CM

## 2020-10-09 DIAGNOSIS — N92.6 IRREGULAR MENSTRUAL BLEEDING: ICD-10-CM

## 2020-10-09 DIAGNOSIS — F43.23 ADJUSTMENT DISORDER WITH MIXED ANXIETY AND DEPRESSED MOOD: ICD-10-CM

## 2020-10-09 DIAGNOSIS — Z00.00 ROUTINE GENERAL MEDICAL EXAMINATION AT A HEALTH CARE FACILITY: Primary | ICD-10-CM

## 2020-10-09 PROCEDURE — 99213 OFFICE O/P EST LOW 20 MIN: CPT | Mod: 25 | Performed by: NURSE PRACTITIONER

## 2020-10-09 PROCEDURE — 99395 PREV VISIT EST AGE 18-39: CPT | Performed by: NURSE PRACTITIONER

## 2020-10-09 RX ORDER — FLUOXETINE 10 MG/1
10 CAPSULE ORAL DAILY
Qty: 30 CAPSULE | Refills: 3 | Status: SHIPPED | OUTPATIENT
Start: 2020-10-09 | End: 2022-06-03

## 2020-10-09 ASSESSMENT — ENCOUNTER SYMPTOMS
DIARRHEA: 1
JOINT SWELLING: 0
SHORTNESS OF BREATH: 0
COUGH: 0
SORE THROAT: 0
ABDOMINAL PAIN: 0
ARTHRALGIAS: 0
FEVER: 0
EYE PAIN: 0
HEADACHES: 1
WEAKNESS: 0
MYALGIAS: 1
DYSURIA: 0
HEARTBURN: 0
BREAST MASS: 0
NAUSEA: 0
PARESTHESIAS: 0
CHILLS: 0
HEMATOCHEZIA: 0
FREQUENCY: 0
NERVOUS/ANXIOUS: 0
PALPITATIONS: 0
CONSTIPATION: 0
DIZZINESS: 0

## 2020-10-09 ASSESSMENT — PATIENT HEALTH QUESTIONNAIRE - PHQ9
SUM OF ALL RESPONSES TO PHQ QUESTIONS 1-9: 11
SUM OF ALL RESPONSES TO PHQ QUESTIONS 1-9: 11
10. IF YOU CHECKED OFF ANY PROBLEMS, HOW DIFFICULT HAVE THESE PROBLEMS MADE IT FOR YOU TO DO YOUR WORK, TAKE CARE OF THINGS AT HOME, OR GET ALONG WITH OTHER PEOPLE: SOMEWHAT DIFFICULT

## 2020-10-09 ASSESSMENT — PAIN SCALES - GENERAL: PAINLEVEL: NO PAIN (0)

## 2020-10-09 NOTE — PROGRESS NOTES
SUBJECTIVE:   CC: Leah Cox is an 30 year old woman who presents for preventive health visit.       Patient has been advised of split billing requirements and indicates understanding: Yes  Healthy Habits:     Getting at least 3 servings of Calcium per day:  Yes    Bi-annual eye exam:  Yes    Dental care twice a year:  Yes    Sleep apnea or symptoms of sleep apnea:  Excessive snoring    Diet:  Carbohydrate counting    Frequency of exercise:  None    Taking medications regularly:  Yes    Medication side effects:  Not applicable    PHQ-2 Total Score: 4    Additional concerns today:  Yes      Patient has been having issues with vaginal bleeding non stop since her cycle started on 9/5/2020. More spotting then anything and then yesterday was a really bright red and then today light normal flow.                 Today's PHQ-2 Score:   PHQ-2 ( 1999 Pfizer) 10/9/2020   Q1: Little interest or pleasure in doing things 1   Q2: Feeling down, depressed or hopeless 3   PHQ-2 Score 4   Q1: Little interest or pleasure in doing things Several days   Q2: Feeling down, depressed or hopeless Nearly every day   PHQ-2 Score 4       Abuse: Current or Past (Physical, Sexual or Emotional) - No  Do you feel safe in your environment? Yes        Social History     Tobacco Use     Smoking status: Current Every Day Smoker     Packs/day: 0.25     Types: Cigarettes     Smokeless tobacco: Never Used   Substance Use Topics     Alcohol use: Yes     Alcohol/week: 0.0 standard drinks     Comment: occ.         Alcohol Use 10/9/2020   Prescreen: >3 drinks/day or >7 drinks/week? No   Prescreen: >3 drinks/day or >7 drinks/week? -       Reviewed orders with patient.  Reviewed health maintenance and updated orders accordingly - Yes  Lab work is in process  Labs reviewed in EPIC  BP Readings from Last 3 Encounters:   10/09/20 126/84   01/06/20 116/84   12/26/19 132/86    Wt Readings from Last 3 Encounters:   10/09/20 83.6 kg (184 lb 5 oz)   01/06/20  85.7 kg (189 lb)   12/26/19 85.4 kg (188 lb 4.8 oz)                  Patient Active Problem List   Diagnosis     Irregular periods     Pelvic pain in female     Carpal tunnel syndrome of right wrist     Adjustment disorder with depressed mood     Excessive or frequent menstruation     Ganglion cyst of dorsum of right wrist     Metacarpal boss     Segmental dysfunction of sacral region     Bilateral low back pain with right-sided sciatica     Segmental dysfunction of lumbar region     Segmental dysfunction of thoracic region     S/P LEEP of cervix     Tobacco use disorder     Major depressive disorder, recurrent episode, moderate (H)     RENAY (generalized anxiety disorder)     Diarrhea, unspecified type     Abdominal pain, generalized     Hypothyroidism, unspecified type     Family dysfunction     Past Surgical History:   Procedure Laterality Date     LEEP TX, CERVICAL  01/23/2013    SUZAN 2 with free margins - Care Everywhere.     RELEASE CARPAL TUNNEL Right 10/5/2016    Procedure: RELEASE CARPAL TUNNEL;  Surgeon: Christian Sebastian MD;  Location:  OR       Social History     Tobacco Use     Smoking status: Current Every Day Smoker     Packs/day: 0.25     Types: Cigarettes     Smokeless tobacco: Never Used   Substance Use Topics     Alcohol use: Yes     Alcohol/week: 0.0 standard drinks     Comment: occ.     Family History   Problem Relation Age of Onset     Hypertension Father          Current Outpatient Medications   Medication Sig Dispense Refill     FLUoxetine (PROZAC) 10 MG capsule Take 1 capsule (10 mg) by mouth daily 30 capsule 3     ibuprofen (ADVIL/MOTRIN) 200 MG capsule Take 400 mg by mouth every 4 hours as needed for fever       levothyroxine (SYNTHROID/LEVOTHROID) 100 MCG tablet TAKE ONE TABLET BY MOUTH ONCE DAILY 90 tablet 1     tiZANidine (ZANAFLEX) 4 MG capsule Take 1 capsule (4 mg) by mouth At Bedtime 7 capsule 0     varenicline (CHANTIX VALENTINA) 0.5 MG X 11 & 1 MG X 42 tablet Take 0.5 mg tab daily for  3 days, THEN 0.5 mg tab twice daily for 4 days, THEN 1 mg twice daily. 53 tablet 0     Allergies   Allergen Reactions     Cyclobenzaprine      Started giving her dreams       Mammogram not appropriate for this patient based on age.    Pertinent mammograms are reviewed under the imaging tab.  History of abnormal Pap smear: NO - age 30- 65 PAP every 3 years recommended  PAP / HPV Latest Ref Rng & Units 4/13/2018 12/8/2015   PAP - NIL NIL   HPV 16 DNA NEG:Negative Negative Negative   HPV 18 DNA NEG:Negative Negative Negative   OTHER HR HPV NEG:Negative Negative Negative     Reviewed and updated as needed this visit by clinical staff  Tobacco  Allergies  Meds   Med Hx  Surg Hx  Fam Hx  Soc Hx        Reviewed and updated as needed this visit by Provider                    Review of Systems   Constitutional: Negative for chills and fever.   HENT: Negative for congestion, ear pain, hearing loss and sore throat.    Eyes: Negative for pain and visual disturbance.   Respiratory: Negative for cough and shortness of breath.    Cardiovascular: Positive for chest pain. Negative for palpitations and peripheral edema.   Gastrointestinal: Positive for diarrhea. Negative for abdominal pain, constipation, heartburn, hematochezia and nausea.   Breasts:  Negative for tenderness, breast mass and discharge.   Genitourinary: Positive for vaginal bleeding. Negative for dysuria, frequency, genital sores, pelvic pain, urgency and vaginal discharge.   Musculoskeletal: Positive for myalgias. Negative for arthralgias and joint swelling.   Skin: Negative for rash.   Neurological: Positive for headaches. Negative for dizziness, weakness and paresthesias.   Psychiatric/Behavioral: Positive for mood changes. The patient is not nervous/anxious.      Chest pain related to smoking, chest tightness after smoking, would like to quit smoking. Diarrhea is intermittent, stress and fast food makes worse, does not want further work up. Vaginal bleeding,  had seen OB/Gyn US in October last year was normal. Did not follow up with OB would like to do so now to discuss the irregular bleeding and wanting to get pregnant. She has been spotting off an on since 9/5/20. Had been fairly regular for around 6 months, then back to the spotting. She is not on any hormonal treatment now. Had been on birth control in the past, periods were more regular on the pill. No other fever or chills. Eating okay, weight is stable, she knows for her height she needs to try to lose some weight. More depression and anxiety, feels overwhelmed at times. Would like to consider a low dose medication to help with this. No new fevers, chills, or night sweats. No new breast changes. She does state 2nd cousin being treated for triple negative breast cancer, mother with breast cancer as well,  Not sure exact age or treatment details.      OBJECTIVE:   /84   Pulse 116   Temp 98.1  F (36.7  C) (Temporal)   Resp 18   Wt 83.6 kg (184 lb 5 oz)   LMP 09/05/2020   SpO2 97%   Breastfeeding No   BMI 32.65 kg/m    Physical Exam  GENERAL: healthy, alert and no distress  EYES: Eyes grossly normal to inspection, PERRL and conjunctivae and sclerae normal  HENT: ear canals and TM's normal, nose and mouth without ulcers or lesions  NECK: no adenopathy, no asymmetry, masses, or scars and thyroid normal to palpation  RESP: lungs clear to auscultation - no rales, rhonchi or wheezes  BREAST: normal without masses, tenderness or nipple discharge and no palpable axillary masses or adenopathy  CV: regular rate and rhythm, normal S1 S2, no S3 or S4, no murmur, click or rub, no peripheral edema and peripheral pulses strong  ABDOMEN: soft, nontender, no hepatosplenomegaly, no masses and bowel sounds normal  MS: no gross musculoskeletal defects noted, no edema  SKIN: no suspicious lesions or rashes  NEURO: Normal strength and tone, mentation intact and speech normal  PSYCH: mentation appears normal, affect  normal/bright  LYMPH: no cervical, supraclavicular, axillary, or inguinal adenopathy    Diagnostic Test Results:  Labs reviewed in Epic    ASSESSMENT/PLAN:   1. Routine general medical examination at a health care facility  - Reviewed health maintenance needs now and moving forward. Reviewed medications and the PHQ9 today.     2. Adjustment disorder with mixed anxiety and depressed mood  - She is not suicidal but is feeling overwhelmed, would like to start low dose medication. Has been on selective serotonin reuptake inhibitor inn the past which was helpful.   - Because she wants to try to get pregnant shortly will try very low dose selective serotonin reuptake inhibitor and I will follow up with her in 4-6 weeks to determine if she is doing better with the medicine, discussed side effects related to the medication and the need to take consistently.   - FLUoxetine (PROZAC) 10 MG capsule; Take 1 capsule (10 mg) by mouth daily  Dispense: 30 capsule; Refill: 3    3. Irregular menstrual bleeding  - She has not been followed consistently by any provider.   - Has been told she has PCOS.  - Would like to establish care with OB in the area as she would like to try to get pregnant in the next 1-2 years.   - OB/GYN REFERRAL    4. Encounter for smoking cessation counseling  - Discussed how to use medication and information provided  - She is ready to quit as having more SOB and tightness after smoking.   - varenicline (CHANTIX VALENTINA) 0.5 MG X 11 & 1 MG X 42 tablet; Take 0.5 mg tab daily for 3 days, THEN 0.5 mg tab twice daily for 4 days, THEN 1 mg twice daily.  Dispense: 53 tablet; Refill: 0    Patient has been advised of split billing requirements and indicates understanding: Yes  COUNSELING:  Special attention given to:        Regular exercise       Healthy diet/nutrition       Contraception       Family planning    Estimated body mass index is 32.65 kg/m  as calculated from the following:    Height as of 1/6/20: 1.6 m (5'  "3\").    Weight as of this encounter: 83.6 kg (184 lb 5 oz).    Weight management plan: Discussed healthy diet and exercise guidelines    She reports that she has been smoking cigarettes. She has been smoking about 0.25 packs per day. She has never used smokeless tobacco.  Tobacco Cessation Action Plan:   Patient will be started on Chantix  - Information provided.       Counseling Resources:  ATP IV Guidelines  Pooled Cohorts Equation Calculator  Breast Cancer Risk Calculator  BRCA-Related Cancer Risk Assessment: FHS-7 Tool  FRAX Risk Assessment  ICSI Preventive Guidelines  Dietary Guidelines for Americans, 2010  [facility]'s MyPlate  ASA Prophylaxis  Lung CA Screening    Kwasi Rosas NP  [facility]  Answers for HPI/ROS submitted by the patient on 10/9/2020   Annual Exam:  If you checked off any problems, how difficult have these problems made it for you to do your work, take care of things at home, or get along with other people?: Somewhat difficult  PHQ9 TOTAL SCORE: 11    "

## 2020-10-09 NOTE — PATIENT INSTRUCTIONS
Preventive Health Recommendations  Female Ages 26 - 39  Yearly exam:   See your health care provider every year in order to    Review health changes.     Discuss preventive care.      Review your medicines if you your doctor has prescribed any.    Until age 30: Get a Pap test every three years (more often if you have had an abnormal result).    After age 30: Talk to your doctor about whether you should have a Pap test every 3 years or have a Pap test with HPV screening every 5 years.   You do not need a Pap test if your uterus was removed (hysterectomy) and you have not had cancer.  You should be tested each year for STDs (sexually transmitted diseases), if you're at risk.   Talk to your provider about how often to have your cholesterol checked.  If you are at risk for diabetes, you should have a diabetes test (fasting glucose).  Shots: Get a flu shot each year. Get a tetanus shot every 10 years.   Nutrition:     Eat at least 5 servings of fruits and vegetables each day.    Eat whole-grain bread, whole-wheat pasta and brown rice instead of white grains and rice.    Get adequate Calcium and Vitamin D.     Lifestyle    Exercise at least 150 minutes a week (30 minutes a day, 5 days of the week). This will help you control your weight and prevent disease.    Limit alcohol to one drink per day.    No smoking.     Wear sunscreen to prevent skin cancer.    See your dentist every six months for an exam and cleaning.  -      pregnancies.   Patient Education     Varenicline oral tablets  Brand Name: Chantix  What is this medicine?  VARENICLINE (karyna EN i kleen) is used to help people quit smoking. It is used with a patient support program recommended by your physician.  How should I use this medicine?  Take this medicine by mouth after eating. Take with a full glass of water. Follow the directions on the prescription label. Take your doses at regular intervals. Do not take your medicine more often than directed.  There are 3  ways you can use this medicine to help you quit smoking; talk to your health care professional to decide which plan is right for you:  1) you can choose a quit date and start this medicine 1 week before the quit date, or,  2) you can start taking this medicine before you choose a quit date, and then pick a quit date between day 8 and 35 days of treatment, or,  3) if you are not sure that you are able or willing to quit smoking right away, start taking this medicine and slowly decrease the amount you smoke as directed by your health care professional with the goal of being cigarette-free by week 12 of treatment.  Stick to your plan; ask about support groups or other ways to help you remain cigarette-free. If you are motivated to quit smoking and did not succeed during a previous attempt with this medicine for reasons other than side effects, or if you returned to smoking after this treatment, speak with your health care professional about whether another course of this medicine may be right for you.  A special MedGuide will be given to you by the pharmacist with each prescription and refill. Be sure to read this information carefully each time.  Talk to your pediatrician regarding the use of this medicine in children. This medicine is not approved for use in children.  What side effects may I notice from receiving this medicine?  Side effects that you should report to your doctor or health care professional as soon as possible:    allergic reactions like skin rash, itching or hives, swelling of the face, lips, tongue, or throat    acting aggressive, being angry or violent, or acting on dangerous impulses    breathing problems    changes in emotions or moods    chest pain or chest tightness    feeling faint or lightheaded, falls    hallucination, loss of contact with reality    mouth sores    redness, blistering, peeling or loosening of the skin, including inside the mouth    signs and symptoms of a stroke like  changes in vision; confusion; trouble speaking or understanding; severe headaches; sudden numbness or weakness of the face, arm or leg; trouble walking; dizziness; loss of balance or coordination    seizures    sleepwalking    suicidal thoughts or other mood changes  Side effects that usually do not require medical attention (report to your doctor or health care professional if they continue or are bothersome):    constipation    gas    headache    nausea, vomiting    strange dreams    trouble sleeping  What may interact with this medicine?      alcohol    insulin    other medicines used to help people quit smoking    theophylline    warfarin  What if I miss a dose?  If you miss a dose, take it as soon as you can. If it is almost time for your next dose, take only that dose. Do not take double or extra doses.  Where should I keep my medicine?  Keep out of the reach of children.  Store at room temperature between 15 and 30 degrees C (59 and 86 degrees F). Throw away any unused medicine after the expiration date.  What should I tell my health care provider before I take this medicine?  They need to know if you have any of these conditions:    heart disease    if you often drink alcohol    kidney disease    mental illness    on hemodialysis    seizures    history of stroke    suicidal thoughts, plans, or attempt; a previous suicide attempt by you or a family member    an unusual or allergic reaction to varenicline, other medicines, foods, dyes, or preservatives    pregnant or trying to get pregnant    breast-feeding  What should I watch for while using this medicine?  It is okay if you do not succeed at your attempt to quit and have a cigarette. You can still continue your quit attempt and keep using this medicine as directed. Just throw away your cigarettes and get back to your quit plan.  Talk to your health care provider before using other treatments to quit smoking. Using this medicine with other treatments to quit  smoking may increase the risk for side effects compared to using a treatment alone.  You may get drowsy or dizzy. Do not drive, use machinery, or do anything that needs mental alertness until you know how this medicine affects you. Do not stand or sit up quickly, especially if you are an older patient. This reduces the risk of dizzy or fainting spells.  Decrease the amount of alcoholic beverages that you drink during treatment with this medicine until you know if this medicine affects your ability to tolerate alcohol. Some people have experienced increased drunkenness (intoxication), unusual or sometimes aggressive behavior, or no memory of things that have happened (amnesia) during treatment with this medicine.  Sleepwalking can happen during treatment with this medicine, and can sometimes lead to behavior that is harmful to you, other people, or property. Stop taking this medicine and tell your doctor if you start sleepwalking or have other unusual sleep-related activity.  Patients and their families should watch out for new or worsening depression or thoughts of suicide. Also watch out for sudden changes in feelings such as feeling anxious, agitated, panicky, irritable, hostile, aggressive, impulsive, severely restless, overly excited and hyperactive, or not being able to sleep. If this happens, call your health care professional.  If you have diabetes and you quit smoking, the effects of insulin may be increased and you may need to reduce your insulin dose. Check with your doctor or health care professional about how you should adjust your insulin dose.  NOTE:This sheet is a summary. It may not cover all possible information. If you have questions about this medicine, talk to your doctor, pharmacist, or health care provider. Copyright  2019 [employer]        I will start you on a low dose medication for the anxiety and depression, I will follow up with a virtual visit regarding the mood in 4-6 weeks.   - Referral  to OB/GYN will be placed to discuss the irregular bleeding and pregnancy moving forward.  - Start Chantix next week, follow instructions, goal will be to have you off the cigarettes in 12 weeks, if you need a refill for another 12 weeks let me know, but this is as long as I would want you on the medication. So if not ready to quit do not start.   - See me yearly.   -Please get me information on your mothers breast cancer and any early screening recommended for you. I will follow up recommendations per her oncologist.     Kwasi Rosas CNP       Patient Education     Fluoxetine capsules, weekly [Depression/Mood Disorders]  Brand Name: Prozac Weekly  What is this medicine?  FLUOXETINE (floo OX e teen) belongs to a class of drugs known as selective serotonin reuptake inhibitors (SSRIs). The weekly capsules can treat mood problems such as depression.  How should I use this medicine?  Take this medicine by mouth with a glass of water. Follow the directions on the prescription label. Do not cut, crush, or chew this medicine. You can take this medicine with or without food. Take it on the same day of the week each week. Do not take your medicine more often than directed. Do not stop taking this medicine suddenly except upon the advice of your doctor. Stopping this medicine too quickly may cause serious side effects or your condition may worsen.  A special MedGuide will be given to you by the pharmacist with each prescription and refill. Be sure to read this information carefully each time.  Talk to your pediatrician regarding the use of this medicine in children. Special care may be needed.  What side effects may I notice from receiving this medicine?  Side effects that you should report to your doctor or health care professional as soon as possible:    allergic reactions like skin rash, itching or hives, swelling of the face, lips, or tongue    anxious    black, tarry stools    breathing problems    changes in  vision    confusion    elevated mood, decreased need for sleep, racing thoughts, impulsive behavior    eye pain    fast, irregular heartbeat    feeling faint or lightheaded, falls    feeling agitated, angry, or irritable    hallucination, loss of contact with reality    loss of balance or coordination    loss of memory    painful or prolonged erections    restlessness, pacing, inability to keep still    seizures    stiff muscles    suicidal thoughts or other mood changes    trouble sleeping    unusual bleeding or bruising    unusually weak or tired    vomiting  Side effects that usually do not require medical attention (report to your doctor or health care professional if they continue or are bothersome):    change in appetite or weight    change in sex drive or performance    diarrhea    dry mouth    headache    increased sweating    indigestion, nausea    tremors  What may interact with this medicine?  Do not take this medicine with any of the following medications:    other medicines containing fluoxetine, like Sarafem or Symbyax    cisapride    linezolid    MAOIs like Carbex, Eldepryl, Marplan, Nardil, and Parnate    methylene blue (injected into a vein)    pimozide    thioridazine  This medicine may also interact with the following medications:    alcohol    amphetamines    aspirin and aspirin-like medicines    carbamazepine    certain medicines for depression, anxiety, or psychotic disturbances    certain medicines for migraine headaches like almotriptan, eletriptan, frovatriptan, naratriptan, rizatriptan, sumatriptan, zolmitriptan    digoxin    diuretics    fentanyl    flecainide    furazolidone    isoniazid    lithium    medicines for sleep    medicines that treat or prevent blood clots like warfarin, enoxaparin, and dalteparin    NSAIDs, medicines for pain and inflammation, like ibuprofen or naproxen    phenytoin    procarbazine    propafenone    rasagiline    ritonavir    supplements like [employer]'s wort,  kava kava, valerian    tramadol    tryptophan    vinblastine  What if I miss a dose?  Try not to miss your scheduled weekly dose. You may want to shannan a calendar to remind you. If you miss a dose, and it is still the day you normally take your medicine each week, then take it as soon as you can. If it is another day, then take the dose as soon as you remember and then adjust your weekly doses to the new day of the week. Do not take double or extra doses. There should be one week between each dose.  Where should I keep my medicine?  Keep out of the reach of children.  Store at room temperature between 15 and 30 degrees C (59 and 86 degrees F). Throw away any unused medicine after the expiration date.  What should I tell my health care provider before I take this medicine?  They need to know if you have any of these conditions:    bipolar disorder or a family history of bipolar disorder    bleeding disorders    glaucoma    heart disease    liver disease    low levels of sodium in the blood    seizures    suicidal thoughts, plans, or attempt; a previous suicide attempt by you or a family member    take MAOIs like Carbex, Eldepryl, Marplan, Nardil, and Parnate    take medicines that treat or prevent blood clots    thyroid disease    an unusual or allergic reaction to fluoxetine, other medicines, foods, dyes, or preservatives    pregnant or trying to get pregnant    breast-feeding  What should I watch for while using this medicine?  Tell your doctor if your symptoms do not get better or if they get worse. Visit your doctor or health care professional for regular checks on your progress. Because it may take several weeks to see the full effects of this medicine, it is important to continue your treatment as prescribed by your doctor.  Patients and their families should watch out for new or worsening thoughts of suicide or depression. Also watch out for sudden changes in feelings such as feeling anxious, agitated, panicky,  irritable, hostile, aggressive, impulsive, severely restless, overly excited and hyperactive, or not being able to sleep. If this happens, especially at the beginning of treatment or after a change in dose, call your health care professional.  You may get drowsy or dizzy. Do not drive, use machinery, or do anything that needs mental alertness until you know how this medicine affects you. Do not stand or sit up quickly, especially if you are an older patient. This reduces the risk of dizzy or fainting spells. Alcohol may interfere with the effect of this medicine. Avoid alcoholic drinks.  Your mouth may get dry. Chewing sugarless gum or sucking hard candy, and drinking plenty of water may help. Contact your doctor if the problem does not go away or is severe.  This medicine may affect blood sugar levels. If you have diabetes, check with your doctor or health care professional before you change your diet or the dose of your diabetic medicine.  NOTE:This sheet is a summary. It may not cover all possible information. If you have questions about this medicine, talk to your doctor, pharmacist, or health care provider. Copyright  2019 [employer]

## 2020-10-10 ASSESSMENT — PATIENT HEALTH QUESTIONNAIRE - PHQ9: SUM OF ALL RESPONSES TO PHQ QUESTIONS 1-9: 11

## 2020-10-19 ENCOUNTER — OFFICE VISIT (OUTPATIENT)
Dept: OBGYN | Facility: CLINIC | Age: 30
End: 2020-10-19
Payer: COMMERCIAL

## 2020-10-19 ENCOUNTER — TELEPHONE (OUTPATIENT)
Facility: CLINIC | Age: 30
End: 2020-10-19

## 2020-10-19 VITALS
SYSTOLIC BLOOD PRESSURE: 116 MMHG | TEMPERATURE: 97.4 F | BODY MASS INDEX: 33.39 KG/M2 | DIASTOLIC BLOOD PRESSURE: 78 MMHG | WEIGHT: 188.5 LBS | HEART RATE: 84 BPM

## 2020-10-19 DIAGNOSIS — N93.9 ABNORMAL UTERINE BLEEDING: Primary | ICD-10-CM

## 2020-10-19 DIAGNOSIS — E66.811 CLASS 1 OBESITY DUE TO EXCESS CALORIES WITHOUT SERIOUS COMORBIDITY WITH BODY MASS INDEX (BMI) OF 33.0 TO 33.9 IN ADULT: ICD-10-CM

## 2020-10-19 DIAGNOSIS — E66.09 CLASS 1 OBESITY DUE TO EXCESS CALORIES WITHOUT SERIOUS COMORBIDITY WITH BODY MASS INDEX (BMI) OF 33.0 TO 33.9 IN ADULT: ICD-10-CM

## 2020-10-19 DIAGNOSIS — E03.9 HYPOTHYROIDISM, UNSPECIFIED TYPE: ICD-10-CM

## 2020-10-19 DIAGNOSIS — N97.0 ANOVULATION: ICD-10-CM

## 2020-10-19 DIAGNOSIS — Z80.3 FAMILY HISTORY OF MALIGNANT NEOPLASM OF BREAST: ICD-10-CM

## 2020-10-19 LAB
CHOLEST SERPL-MCNC: 216 MG/DL
HBA1C MFR BLD: 4.9 % (ref 0–5.6)
HDLC SERPL-MCNC: 48 MG/DL
LDLC SERPL CALC-MCNC: 110 MG/DL
NONHDLC SERPL-MCNC: 168 MG/DL
TRIGL SERPL-MCNC: 289 MG/DL
TSH SERPL DL<=0.005 MIU/L-ACNC: 3.06 MU/L (ref 0.4–4)

## 2020-10-19 PROCEDURE — 36415 COLL VENOUS BLD VENIPUNCTURE: CPT | Performed by: OBSTETRICS & GYNECOLOGY

## 2020-10-19 PROCEDURE — 80061 LIPID PANEL: CPT | Performed by: OBSTETRICS & GYNECOLOGY

## 2020-10-19 PROCEDURE — 99213 OFFICE O/P EST LOW 20 MIN: CPT | Performed by: OBSTETRICS & GYNECOLOGY

## 2020-10-19 PROCEDURE — 83036 HEMOGLOBIN GLYCOSYLATED A1C: CPT | Performed by: OBSTETRICS & GYNECOLOGY

## 2020-10-19 PROCEDURE — 84443 ASSAY THYROID STIM HORMONE: CPT | Performed by: OBSTETRICS & GYNECOLOGY

## 2020-10-19 RX ORDER — NORGESTIMATE AND ETHINYL ESTRADIOL 0.25-0.035
1 KIT ORAL DAILY
Qty: 90 TABLET | Refills: 4 | Status: SHIPPED | OUTPATIENT
Start: 2020-10-19 | End: 2021-10-15

## 2020-10-19 NOTE — TELEPHONE ENCOUNTER
RN routing to provider for advisement on if the history testing pt is asking about is the referral to genetic counseling?    Precious Jerome RN on 10/19/2020 at 4:02 PM

## 2020-10-19 NOTE — TELEPHONE ENCOUNTER
Patient called wondering about the History testing. Is she supposed to make an appointment for that and if so with who?  Please call her and let her know

## 2020-10-20 NOTE — TELEPHONE ENCOUNTER
Olivia Henry, DO  You 14 hours ago (5:20 PM)     Referral was placed today, they will contact her in 2-3 days to set up the appointment. If she does not hear back by Friday, let us know and we can assist.     Olivia Henry, DO    Message text      Unable to reach patient via phone. RN left a message and instructed patient to call the clinic at [phone] and ask for Women's Health.    Precious Jerome RN on 10/20/2020 at 8:07 AM

## 2020-10-21 NOTE — TELEPHONE ENCOUNTER
Closing encounter as it looks like pt is scheduled to chat with a cancer genetic  on 2/24/2021.    Eden Graham RN

## 2020-10-30 DIAGNOSIS — Z71.6 ENCOUNTER FOR SMOKING CESSATION COUNSELING: ICD-10-CM

## 2020-10-30 NOTE — TELEPHONE ENCOUNTER
"Routing refill request to provider for review/approval because:  Instruction and dose may need change per provider       Requested Prescriptions   Pending Prescriptions Disp Refills     varenicline (CHANTIX STARTING MONTH VALENTINA) 0.5 MG X 11 & 1 MG X 42 tablet [Pharmacy Med Name: CHANTIX STARTING 0.5 MG X 11 & TABS] 53 tablet 0     Sig: TAKE 1 WHITE (0.5MG) TAB ONCE DAILY FOR 3 DAYS THEN TWICE DAILY FOR 4 DAYS. THEN 1 BLUE (1MG) TAB TWICE DAILY. TAKE WITH FOOD. QUIT AS INSTRUCTED.   10/09/2020    Partial Cholinergic Nicotinic Agonist Agents Passed - 10/30/2020  5:02 AM        Passed - Blood pressure under 140/90 in past 12 months     BP Readings from Last 3 Encounters:   10/19/20 116/78   10/09/20 126/84   01/06/20 116/84           Passed - Recent (12 mo) or future (30 days) visit within the authorizing provider's specialty     Patient has had an office visit with the authorizing provider or a provider within the authorizing providers department within the previous 12 mos or has a future within next 30 days. See \"Patient Info\" tab in inbasket, or \"Choose Columns\" in Meds & Orders section of the refill encounter.              Passed - Medication is active on med list        Passed - Patient is 18 years of age or older        Passed - Patient is not pregnant        Passed - No positive pregnancy test on file in past 12 months         Eveline Clemons RN    "

## 2020-11-27 NOTE — TELEPHONE ENCOUNTER
RECORDS STATUS - ALL OTHER DIAGNOSIS      RECORDS RECEIVED FROM: [facility]   DATE RECEIVED: 2/24/2021   NOTES STATUS DETAILS   OFFICE NOTE from referring provider Olivia Lei DO   OFFICE NOTE from medical oncologist     OPERATIVE REPORT N/A    LABS     PATHOLOGY REPORTS N/A    ANYTHING RELATED TO DIAGNOSIS     GENONOMIC TESTING     TYPE:     IMAGING (NEED IMAGES & REPORT)     CT SCANS     MRI     MAMMO     ULTRASOUND     PET

## 2020-12-16 DIAGNOSIS — E03.9 HYPOTHYROIDISM, UNSPECIFIED TYPE: ICD-10-CM

## 2020-12-16 RX ORDER — LEVOTHYROXINE SODIUM 100 UG/1
TABLET ORAL
Qty: 90 TABLET | Refills: 2 | Status: SHIPPED | OUTPATIENT
Start: 2020-12-16 | End: 2021-09-14

## 2020-12-16 NOTE — TELEPHONE ENCOUNTER
"  Requested Prescriptions   Pending Prescriptions Disp Refills     levothyroxine (SYNTHROID/LEVOTHROID) 100 MCG tablet [Pharmacy Med Name: LEVOTHYROXINE 100MCG TAB] 90 tablet 1     Sig: TAKE ONE TABLET BY MOUTH ONCE DAILY   10/9/2020    Thyroid Protocol Passed - 12/16/2020  5:02 AM        Passed - Patient is 12 years or older        Passed - Recent (12 mo) or future (30 days) visit within the authorizing provider's specialty     Patient has had an office visit with the authorizing provider or a provider within the authorizing providers department within the previous 12 mos or has a future within next 30 days. See \"Patient Info\" tab in inbasket, or \"Choose Columns\" in Meds & Orders section of the refill encounter.              Passed - Medication is active on med list        Passed - Normal TSH on file in past 12 months     Recent Labs   Lab Test 10/19/20  1459   TSH 3.06              Passed - No active pregnancy on record     If patient is pregnant or has had a positive pregnancy test, please check TSH.          Passed - No positive pregnancy test in past 12 months     If patient is pregnant or has had a positive pregnancy test, please check TSH.           Prescription approved per [facility] [place].  Eveline Clemons RN      "

## 2020-12-17 DIAGNOSIS — E03.9 HYPOTHYROIDISM, UNSPECIFIED TYPE: ICD-10-CM

## 2020-12-17 RX ORDER — LEVOTHYROXINE SODIUM 100 UG/1
TABLET ORAL
Qty: 90 TABLET | Refills: 1 | OUTPATIENT
Start: 2020-12-17

## 2020-12-18 DIAGNOSIS — E03.9 HYPOTHYROIDISM, UNSPECIFIED TYPE: ICD-10-CM

## 2020-12-18 RX ORDER — LEVOTHYROXINE SODIUM 100 UG/1
TABLET ORAL
Qty: 90 TABLET | Refills: 1 | OUTPATIENT
Start: 2020-12-18

## 2020-12-19 DIAGNOSIS — E03.9 HYPOTHYROIDISM, UNSPECIFIED TYPE: ICD-10-CM

## 2020-12-22 RX ORDER — LEVOTHYROXINE SODIUM 100 UG/1
TABLET ORAL
Qty: 90 TABLET | Refills: 1 | OUTPATIENT
Start: 2020-12-22

## 2020-12-22 NOTE — TELEPHONE ENCOUNTER
Prescription was sent 12/16/2020 for #90 with 2 refills.  Pharmacy notified via E-Prescribe refusal.     JOE HamiltonN, RN  [facility]

## 2021-01-13 ENCOUNTER — TELEPHONE (OUTPATIENT)
Dept: FAMILY MEDICINE | Facility: CLINIC | Age: 31
End: 2021-01-13

## 2021-01-13 NOTE — TELEPHONE ENCOUNTER
: 1990  PHONE #'s: [phone] (home) [phone] (work)    PRESENTING PROBLEM:  Pt started on new OCP, Ortho-Cyclen, on 10/19/20, Dr.Kathryn Erazo, OB/ GYN. She said with her second period , she noticed she was passing blood clots. With each period since ,she thinks it is more pronounced.     NURSING ASSESSMENT  Description:  The clots this month are the size of my pinky finger at times. Is this normal?  Do I need to go on a different dose or pill?  Last OCP I was on was Sprintec, and that was 13 years ago. I don't remember passing clots before. I even changed from tampons to pads to see if it made a difference and it doesn't. I change a pad once every 4 hours approx and a tampon every 2.5 hours.   Onset/duration:  Started noticing clots with her second month of Ortho-Cyclen, seems to be getting more each month with cramping.   Precip. factors:   Etiology unknown   Assoc. Sx:  Moderate cramps with her period.   Improves/worsens Sx:  Worse.   Pain scale (1-10)   5/10  Sx specific meds:  Orth- Cyclen OCP  LMP/preg/breast feedin21  Last exam/Tx:  10/19/20 Dr. Henry, OB/ GYN.     RECOMMENDED DISPOSITION:  Home care advice - cont med as prescribed until hears back from OB. GYN, Dr. Olivia Henry. RN will forward message to her since she is the prescriber of med. [phone] (home) [phone] (work)    Will comply with recommendation: YES   If further questions/concerns or if Sx do not improve, worsen or new Sx develop, call your PCP or [facility] as soon as possible.    NOTES:  Disposition was determined by the first positive assessment question, therefore all previous assessment questions were negative.  Informed to check provider manual or call insurance company to assure coverage.    Guideline used:  NONE, just took message for consulant regarding Sx on OCP.     Rufina Hargrove RN

## 2021-01-13 NOTE — TELEPHONE ENCOUNTER
"Pt returned call, I relayed providers message. Pt declines follow up appointment as long as the bleeding/clots \"are normal\" and not concerning.      Pt will call clinic with any further concerns.    JOE WilsonN RN    "

## 2021-02-17 ENCOUNTER — TELEPHONE (OUTPATIENT)
Dept: ONCOLOGY | Facility: CLINIC | Age: 31
End: 2021-02-17

## 2021-02-17 NOTE — TELEPHONE ENCOUNTER
Left l for Pt to call back and reschedule 2/24 visit to other [facility] first (Marli,Jeremie,Krishan, etc) instead of [facility] per IB

## 2021-02-17 NOTE — TELEPHONE ENCOUNTER
ONCOLOGY INTAKE: Records Information      APPT INFORMATION:  Referring provider:  Olivia Henry DO  Referring provider s clinic:  [facility]   Reason for visit/diagnosis:  Family history of malignant neoplasm of breast   Has patient been notified of appointment date and time?: Yes    RECORDS INFORMATION:  Were the records received with the referral (via [employer])? No,Internal Referral      Has patient been seen for any external appt for this diagnosis? No    If yes, where? NA      ADDITIONAL INFORMATION:  None

## 2021-02-24 ENCOUNTER — PRE VISIT (OUTPATIENT)
Dept: ONCOLOGY | Facility: CLINIC | Age: 31
End: 2021-02-24

## 2021-03-12 ENCOUNTER — PRE VISIT (OUTPATIENT)
Dept: ONCOLOGY | Facility: CLINIC | Age: 31
End: 2021-03-12

## 2021-03-12 ENCOUNTER — VIRTUAL VISIT (OUTPATIENT)
Dept: ONCOLOGY | Facility: CLINIC | Age: 31
End: 2021-03-12
Attending: OBSTETRICS & GYNECOLOGY
Payer: COMMERCIAL

## 2021-03-12 DIAGNOSIS — Z80.0 FAMILY HISTORY OF STOMACH CANCER: ICD-10-CM

## 2021-03-12 DIAGNOSIS — Z80.3 FAMILY HISTORY OF MALIGNANT NEOPLASM OF BREAST: Primary | ICD-10-CM

## 2021-03-12 DIAGNOSIS — Z80.0 FAMILY HISTORY OF COLON CANCER: ICD-10-CM

## 2021-03-12 PROCEDURE — 96040 HC GENETIC COUNSELING, EACH 30 MINUTES: CPT | Mod: TEL | Performed by: GENETIC COUNSELOR, MS

## 2021-03-12 NOTE — PROGRESS NOTES
3/12/2021    Virginia is a 30 year old who is being evaluated via a billable telephone visit.      Phone call duration: 60 minutes    Referring Provider: Olivia Henry DO    Presenting Information:   I spoke with Leah Cox over the phone today for genetic counseling to discuss her family history of cancer. With her permission, this appointment was conducted over the phone due to COVID-19 precautions. We talked today to review this history, cancer screening recommendations, and available genetic testing options.    Personal History:  Virginia is a 30 year old female. She does not have any personal history of cancer.    She had her first menstrual period at age 14 and is premenopausal. She does not have any children. Virginia has her ovaries, fallopian tubes and uterus in place. She reports that she has not used hormone replacement therapy. She is currently using oral contraceptives. She has clinical breast exams, but has not yet had any breast imaging due to her age. Virginia has not had a colonoscopy due to her age. [place] reported that she has quit smoking (has not smoked in 3 weeks). She reported alcohol use of 1-2 drinks per week. 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    Family History: (Please see scanned pedigree for detailed family history information)  Maternal:    Her mother is 54 years old and has a history of breast cancer first diagnosed at age 18. She was then diagnosed with breast cancer two more times (second time unknown age third time at age 24). Virginia is not sure if these were 3 separate cancers or recurrences of her first breast cancer. Unknown what her treatment was. She then had a lump on her stomach about 13 years ago that turned out to be a cyst. Virginia reports that she is very private about her health and does not share a lot of this information. She has not had any known genetic testing.    Her aunt is in her early 50s and possibly has a history  of bone cancer. Virginia has limited details about this.    Her grandfather passed away in approximately his mid 60s due to stomach cancer diagnosed at an unknown age.    She reports that she has limited contact with many of her maternal relatives.  Paternal:    Her father is 53 years old with no known history of cancer.    Her half aunt (through her grandmother) was diagnosed with lung cancer and passed away in her late 60s. She had a history of smoking.    Her half uncle (through her grandmother) has no known history of cancer.    His daughter is 37 years old and was recently diagnosed with triple negative breast cancer. Treatment included radiation, chemotherapy, surgery. It is unknown whether she has had any genetic testing.    Her grandfather passed away in his early 50s due to colon cancer diagnosed at an unknown age.    Her ancestry is English, Bulgarian, [place], [place], Nigerian, Germanic [place], Indian per ancestry testing (unknown which are maternal and which are paternal ancestry). There is no known Ashkenazi Caodaism ancestry on either side of her family.     Discussion:    Virginia's family history of cancer is suggestive of a hereditary cancer syndrome.    We reviewed the features of sporadic, familial, and hereditary cancers. In looking at Virginia's family history, it is possible that a cancer susceptibility gene is present due to her family history of young breast cancer in her mother, as well as her paternal family history of triple negative breast cancer in her cousin at a young age.    We discussed the natural history and genetics of hereditary cancer. We discussed the BRCA1 and BRCA2 genes. Mutations in these genes cause a condition known as Hereditary Breast and Ovarian Cancer syndrome (HBOC). Women with a mutation in either of these genes are at increased risk for breast and ovarian cancer. There is also an increased risk for a second primary breast cancer. Men with a mutation in either of these  genes are at increased risk for breast and prostate cancer. Both women and men may also be at increased risk for pancreatic cancer and melanoma. A detailed handout regarding these genes and other genes in which mutations are associated with an increased risk for breast cancer will be provided to Virginia along with this letter and can be found in the after visit summary. Topics included: inheritance pattern, cancer risks, cancer screening recommendations, and also risks, benefits and limitations of testing.    We spent some time discussing the most informative approach to genetic testing. In families suspicious for a hereditary cancer syndrome, it is always most informative to begin testing with a family member who has a history of cancer. When we begin testing in someone who has not had cancer, a positive result (detected gene mutation) would be informative; however, a negative result (no gene mutation detected) would be uninformative. Uninformative results provide little new information about cancer risks. The most informative candidates for genetic testing in Virginia's family are her mother and her paternal cousin who have both had young breast cancers. She will talk with her mother to find out if she would be interested in her own genetic testing. She will also talk with her cousin to try to find out if she has ever had any genetic testing.    Based on her personal and family history, Virginia meets current National Comprehensive Cancer Network (NCCN) criteria for genetic testing of high-penetrance breast and/or ovarian cancer susceptibility genes, which often includes BRCA1, BRCA2, CDH1, PALB2, PTEN, and TP53 among others.     We discussed that there are additional genes that could cause increased risk for breast cancer. As many of these genes present with overlapping features in a family and accurate cancer risk cannot always be established based upon the pedigree analysis alone, it would be reasonable for  Virginia to consider panel genetic testing to analyze multiple genes at once.    Virginia opted to first talk with her family members to find out more information and to see if her mother and cousin have had or are interested in their own genetic testing.  She will call me next week to let me know what she has found out. The testing and/or screening plan for [place] will be finalized once we have this additional information.    Medical Management: For Virginia, we reviewed that the information from genetic testing may determine:    additional cancer screening for which [place] may qualify (i.e. mammogram and breast MRI, more frequent colonoscopies, more frequent dermatologic exams, etc.),    options for risk reducing surgeries Virginia could consider (i.e. bilateral mastectomy, surgery to remove her ovaries and/or uterus, etc.),      and targeted chemotherapies if she were to develop certain cancers in the future (i.e. immunotherapy for individuals with Kline syndrome, PARP inhibitors, etc.).     These recommendations will be discussed in detail once genetic testing is completed.     Plan:  1) No genetic testing was ordered today. Virginia will first speak with her family members to find out more information.   2) We will speak next week to discuss this additional information and finalize plan for testing and/or screening for [place].  3) She was provided with my contact information today and encouraged to call me with any additional questions or concerns.    Time spent over phone: 60 minutes    Tanya Chao MS, [facility] [place]  Licensed, Certified Genetic Counselor  Office: [phone]  Email: [email or web address]

## 2021-03-12 NOTE — LETTER
Cancer Risk Management  Program Locations    [facility]  [facility]  [facility]  [facility]  [facility]  Mailing Address  Cancer Risk Management Program  [facility]  [address]  [place] [address]    New patient appointments  [phone]  March 16, 2021    Leah Cox  [address]  [facility] [address]      Dear Virginia,    It was a pleasure speaking with you on the phone for genetic counseling on 3/12/2021. Here is a copy of the progress note from our discussion. If you have any additional questions, please feel free to call.    Referring Provider: Olivia Henry DO    Presenting Information:   I spoke with Virginia Kenny over the phone today for genetic counseling to discuss her family history of cancer. With her permission, this appointment was conducted over the phone due to COVID-19 precautions. We talked today to review this history, cancer screening recommendations, and available genetic testing options.    Personal History:  Virginia is a 30 year old female. She does not have any personal history of cancer.    She had her first menstrual period at age 14 and is premenopausal. She does not have any children. Virginia has her ovaries, fallopian tubes and uterus in place. She reports that she has not used hormone replacement therapy. She is currently using oral contraceptives. She has clinical breast exams, but has not yet had any breast imaging due to her age. Virginia has not had a colonoscopy due to her age. Virginia reported that she has quit smoking (has not smoked in 3 weeks). She reported alcohol use of 1-2 drinks per week. 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    Family History: (Please see scanned pedigree for detailed family history information)  Maternal:    Her mother is 54 years old and has a  history of breast cancer first diagnosed at age 18. She was then diagnosed with breast cancer two more times (second time unknown age third time at age 24). Virginia is not sure if these were 3 separate cancers or recurrences of her first breast cancer. Unknown what her treatment was. She then had a lump on her stomach about 13 years ago that turned out to be a cyst. Virginia reports that she is very private about her health and does not share a lot of this information. She has not had any known genetic testing.    Her aunt is in her early 50s and possibly has a history of bone cancer. Virginia has limited details about this.    Her grandfather passed away in approximately his mid 60s due to stomach cancer diagnosed at an unknown age.    She reports that she has limited contact with many of her maternal relatives.  Paternal:    Her father is 53 years old with no known history of cancer.    Her half aunt (through her grandmother) was diagnosed with lung cancer and passed away in her late 60s. She had a history of smoking.    Her half uncle (through her grandmother) has no known history of cancer.    His daughter is 37 years old and was recently diagnosed with triple negative breast cancer. Treatment included radiation, chemotherapy, surgery. It is unknown whether she has had any genetic testing.    Her grandfather passed away in his early 50s due to colon cancer diagnosed at an unknown age.    Her ancestry is English, [place]er, Danish, Mexican, Kazakh, Germanic [place], South Korean per ancestry testing (unknown which are maternal and which are paternal ancestry). There is no known Ashkenazi Holiness ancestry on either side of her family.     Discussion:    Virginia's family history of cancer is suggestive of a hereditary cancer syndrome.    We reviewed the features of sporadic, familial, and hereditary cancers. In looking at Virginia's family history, it is possible that a cancer susceptibility gene is present due to her  family history of young breast cancer in her mother, as well as her paternal family history of triple negative breast cancer in her cousin at a young age.    We discussed the natural history and genetics of hereditary cancer. We discussed the BRCA1 and BRCA2 genes. Mutations in these genes cause a condition known as Hereditary Breast and Ovarian Cancer syndrome (HBOC). Women with a mutation in either of these genes are at increased risk for breast and ovarian cancer. There is also an increased risk for a second primary breast cancer. Men with a mutation in either of these genes are at increased risk for breast and prostate cancer. Both women and men may also be at increased risk for pancreatic cancer and melanoma. A detailed handout regarding these genes and other genes in which mutations are associated with an increased risk for breast cancer will be provided to Virginia along with this letter and can be found in the after visit summary. Topics included: inheritance pattern, cancer risks, cancer screening recommendations, and also risks, benefits and limitations of testing.    We spent some time discussing the most informative approach to genetic testing. In families suspicious for a hereditary cancer syndrome, it is always most informative to begin testing with a family member who has a history of cancer. When we begin testing in someone who has not had cancer, a positive result (detected gene mutation) would be informative; however, a negative result (no gene mutation detected) would be uninformative. Uninformative results provide little new information about cancer risks. The most informative candidates for genetic testing in Virginia's family are her mother and her paternal cousin who have both had young breast cancers. She will talk with her mother to find out if she would be interested in her own genetic testing. She will also talk with her cousin to try to find out if she has ever had any genetic  testing.    Based on her personal and family history, Virginia meets current National Comprehensive Cancer Network (NCCN) criteria for genetic testing of high-penetrance breast and/or ovarian cancer susceptibility genes, which often includes BRCA1, BRCA2, CDH1, PALB2, PTEN, and TP53 among others.     We discussed that there are additional genes that could cause increased risk for breast cancer. As many of these genes present with overlapping features in a family and accurate cancer risk cannot always be established based upon the pedigree analysis alone, it would be reasonable for Virginia to consider panel genetic testing to analyze multiple genes at once.    Virginia opted to first talk with her family members to find out more information and to see if her mother and cousin have had or are interested in their own genetic testing.  She will call me next week to let me know what she has found out. The testing and/or screening plan for Virginia will be finalized once we have this additional information.    Medical Management: For Virginia, we reviewed that the information from genetic testing may determine:    additional cancer screening for which Virginia may qualify (i.e. mammogram and breast MRI, more frequent colonoscopies, more frequent dermatologic exams, etc.),    options for risk reducing surgeries Virginia could consider (i.e. bilateral mastectomy, surgery to remove her ovaries and/or uterus, etc.),      and targeted chemotherapies if she were to develop certain cancers in the future (i.e. immunotherapy for individuals with Kline syndrome, PARP inhibitors, etc.).     These recommendations will be discussed in detail once genetic testing is completed.     Plan:  1) No genetic testing was ordered today. Virginia will first speak with her family members to find out more information.   2) We will speak next week to discuss this additional information and finalize plan for testing and/or screening for  Virginia.  3) She was provided with my contact information today and encouraged to call me with any additional questions or concerns.    Tanya Chao MS, [facility]  Licensed, Certified Genetic Counselor  Office: [phone]  Email: [email or web address]                                      Assessing Cancer Risk  Only about 5-10% of cancers are thought to be due to an inherited cancer susceptibility gene.    These families often have:    Several people with the same or related types of cancer    Cancers diagnosed at a young age (before age 50)    Individuals with more than one primary cancer    Multiple generations of the family affected with cancer    Some people may be candidates for genetic testing of more than one gene.  For these families, genetic testing using a cancer panel may be offered.  These panels will test different genes known to increase the risk for breast, ovarian, uterine, and/or other cancers. All of the genes discussed below have published clinical management guidelines for individuals who are found to carry a mutation. The purpose of this handout is to serve as a brief summary of the genes analyzed by the panels used to inquire about hereditary breast and gynecologic cancer:  DAYANA, BRCA1, BRCA2, BRIP1, CDH1, CHEK2, MLH1, MSH2, MSH6, PMS2, EPCAM, PTEN, PALB2, RAD51C, RAD51D, and TP53.  ______________________________________________________________________________  Hereditary Breast and Ovarian Cancer Syndrome   (BRCA1 and BRCA2)  A single mutation in one of the copies of BRCA1 or BRCA2 increases the risk for breast and ovarian cancer, among others.  The risk for pancreatic cancer and melanoma may also be slightly increased in some families.  The chart below shows the chance that someone with a BRCA mutation would develop cancer in his or her lifetime1,2,3,4.        A person s ethnic background is also important to consider, as individuals of Ashkenazi Adventism ancestry have a higher chance of having a  BRCA gene mutation.  There are three BRCA mutations that occur more frequently in this population.    Kline Syndrome   (MLH1, MSH2, MSH6, PMS2, and EPCAM)  Currently five genes are known to cause Kline Syndrome: MLH1, MSH2, MSH6, PMS2, and EPCAM.  A single mutation in one of the Kline Syndrome genes increases the risk for colon, endometrial, ovarian, and stomach cancers.  Other cancers that occur less commonly in Kline Syndrome include urinary tract, skin, and brain cancers.  The chart below shows the chance that a person with Kline syndrome would develop cancer in his or her lifetime5.      *Cancer risk varies depending on Kline syndrome gene found    Cowden Syndrome   (PTEN)  Cowden syndrome is a hereditary condition that increases the risk for breast, thyroid, endometrial, colon, and kidney cancer.  Cowden syndrome is caused by a mutation in the PTEN gene.  A single mutation in one of the copies of PTEN causes Cowden syndrome and increases cancer risk.  The chart below shows the chance that someone with a PTEN mutation would develop cancer in their lifetime6,7.  Other benign features seen in some individuals with Cowden syndrome include benign skin lesions (facial papules, keratoses, lipomas), learning disability, autism, thyroid nodules, colon polyps, and larger head size.      *One recent study found breast cancer risk to be increased to 85%    Li-Fraumeni Syndrome   (TP53)  Li-Fraumeni Syndrome (LFS) is a cancer predisposition syndrome caused by a mutation in the TP53 gene. A single mutation in one of the copies of TP53 increases the risk for multiple cancers. Individuals with LFS are at an increased risk for developing cancer at a young age. The lifetime risk for development of a LFS-associated cancer is 50% by age 30 and 90% by age 60.   Core Cancers: Sarcomas, Breast, Brain, Lung, Leukemias/Lymphomas, Adrenocortical carcinomas  Other Cancers: Gastrointestinal, Thyroid, Skin, Genitourinary    Hereditary  Diffuse Gastric Cancer   (CDH1)  Currently, one gene is known to cause hereditary diffuse gastric cancer (HDGC): CDH1.  Individuals with HDGC are at increased risk for diffuse gastric cancer and lobular breast cancer. Of people diagnosed with HDGC, 30-50% have a mutation in the CDH1 gene.  This suggests there are likely other genes that may cause HDGC that have not been identified yet.      Lifetime Cancer Risks    General Population HDGC    Diffuse Gastric  <1% ~80%   Breast 12% 39-52%         Additional Genes  DAYANA  DAYANA is a moderate-risk breast cancer gene. Women who have a mutation in DAYANA can have between a 2-4 fold increased risk for breast cancer compared to the general population8. DAYANA mutations have also been associated with increased risk for pancreatic cancer, however an estimate of this cancer risk is not well understood9. Individuals who inherit two DAYANA mutations have a condition called ataxia-telangiectasia (AT).  This rare autosomal recessive condition affects the nervous system and immune system, and is associated with progressive cerebellar ataxia beginning in childhood.  Individuals with ataxia-telangiectasia often have a weakened immune system and have an increased risk for childhood cancers.    PALB2  Mutations in PALB2 have been shown to increase the risk of breast cancer up to 33-58% in some families; where individuals fall within this risk range is dependent upon family trurceu86. PALB2 mutations have also been associated with increased risk for pancreatic cancer, although this risk has not been quantified yet.  Individuals who inherit two PALB2 mutations--one from their mother and one from their father--have a condition called Fanconi Anemia.  This rare autosomal recessive condition is associated with short stature, developmental delay, bone marrow failure, and increased risk for childhood cancers.    CHEK2   CHEK2 is a moderate-risk breast cancer gene.  Women who have a mutation in CHEK2 have  around a 2-fold increased risk for breast cancer compared to the general population, and this risk may be higher depending upon family history.11,12,13 Mutations in CHEK2 have also been shown to increase the risk of a number of other cancers, including colon and prostate, however these cancer risks are currently not well understood.    BRIP1, RAD51C and RAD51D  Mutations in BRIP1, RAD51C, and RAD51D have been shown to increase the risk of ovarian cancer and possibly female breast cancer as well14,15 .       Lifetime Cancer Risk    General Population BRIP1 RAD51C RAD51D   Ovarian 1-2% ~5-8% ~5-9% ~7-15%           Inheritance  All of the cancer syndromes reviewed above are inherited in an autosomal dominant pattern.  This means that if a parent has a mutation, each of his or her children will have a 50% chance of inheriting that same mutation.  Therefore, each child--male or female--would have a 50% chance of being at increased risk for developing cancer.      Image obtained from [employer], 2013     Mutations in some genes can occur de mag, which means that a person s mutation occurred for the first time in them and was not inherited from a parent.  Now that they have the mutation, however, it can be passed on to future generations.    Genetic Testing  Genetic testing involves a blood test and will look at the genetic information in the DAYANA, BRCA1, BRCA2, BRIP1, CDH1, CHEK2, MLH1, MSH2, MSH6, PMS2, EPCAM, PTEN, PALB2, RAD51C, RAD51D, and TP53 genes for any harmful mutations that are associated with increased cancer risk.  If possible, it is recommended that the person(s) who has had cancer be tested before other family members.  That person will give us the most useful information about whether or not a specific gene is associated with the cancer in the family.    Results  There are three possible results of genetic testing:    Positive--a harmful mutation was identified in one or more of the  genes    Negative--no mutation was identified in any of the genes on this panel    Variant of unknown significance--a variation in one of the genes was identified, but it is unclear how this impacts cancer risk in the family    Advantages and Disadvantages   There are advantages and disadvantages to genetic testing.    Advantages    May clarify your cancer risk    Can help you make medical decisions    May explain the cancers in your family    May give useful information to your family members (if you share your results)    Disadvantages    Possible negative emotional impact of learning about inherited cancer risk    Uncertainty in interpreting a negative test result in some situations    Possible genetic discrimination concerns (see below)    Genetic Information Nondiscrimination Act (PILAR)  PILAR is a federal law that protects individuals from health insurance or employment discrimination based on a genetic test result alone.  Although rare, there are currently no legal discrimination protections in terms of life insurance, long term care, or disability insurances.  Visit the National Human Genome Research [place] website to learn more.    Reducing Cancer Risk  All of the genes described above have nationally recognized cancer screening guidelines that would be recommended for individuals who test positive.  In addition to increased cancer screening, surgeries may be offered or recommended to reduce cancer risk.  Recommendations are based upon an individual s genetic test result as well as their personal and family history of cancer.    Questions to Think About Regarding Genetic Testing:    What effect will the test result have on me and my relationship with my family members if I have an inherited gene mutation?  If I don t have a gene mutation?    Should I share my test results, and how will my family react to this news, which may also affect them?    Are my children ready to learn new information that may one  day affect their own health?    Hereditary Cancer Resources    FORCE: Facing Our Risk of Cancer Empowered facingourrisk.org   Bright Pink bebrightpink.org   Li-Fraumeni Syndrome Association lfsassociation.org   PTEN World PTENworld.com   No stomach for cancer, Inc. nostomachforcancer.org   Stomach cancer relief network Scrnet.org   Collaborative Group of the Americas on Inherited Colorectal Cancer (CGA) cgaicc.com    Cancer Care cancercare.org   American Cancer Society (ACS) cancer.org   National Cancer [place] (NCI) cancer.gov     Please call us if you have any questions or concerns.   Cancer Risk Management Program [phone] ([phone])  ? Pablo Calles, MS, [facility] [phone]  ? Brooklyn Alvarenga, MS, [facility]  [phone]  ? Lynda Matthews, MS, [facility]  [phone]  ? Isela Hernandez, MS, [facility] [phone]  ? Claudia Rojas, MS, [facility] [phone]  ? Tanya Chao, MS, [facility]  [phone]    References  1. Zay A, Berenice PDP, Luis Armando S, Ruiz RENEE, Rickey JE, Margaret JL, Henna N, Issa H, Leticia O, Yana A, Yana B, Narendra P, Jordan S, Manjinder DM, Victoria N, Judy E, Sabas H, Moran E, Tyroneinski J, Gronwald J, Devonte B, Uriel H, Cameronlaci S, Eerola H, Shabana H, Allyn K, Michael OP. Average risks of breast and ovarian cancer associated with BRCA1 or BRCA2 mutations detected in case series unselected for family history: a combined analysis of 222 studies. Am J Hum Kim. 2003;72:1117-30.  2. William DUPONT, Dede SUN, Viviana EASTON.  BRCA1 and BRCA2 Hereditary Breast and Ovarian Cancer. Gene Reviews online. 2013.  3. Mike YC, Esther S, Genaro G, Madrigal S. Breast cancer risk among male BRCA1 and BRCA2 mutation carriers. J Natl Cancer Inst. 2007;99:1811-4.  4. Robinson KEY, Cris I, Rohith J, Barry E, Shirin ER, Yesika F. Risk of breast cancer in male BRCA2 carriers. J Med Kim. 2010;47:710-1.  5. [facility]. Clinical practice guidelines in oncology, colorectal cancer screening.  Available online (registration required). 2015.  6. Navid MH, Greg J, Laury J, Cat LA, Oralia MS, Eng C. Lifetime cancer risks in individuals with germline PTEN mutations. Clin Cancer Res. 2012;18:400-7.  7. Bety APARICIO. Cowden Syndrome: A Critical Review of the Clinical Literature. J Kim . 2009:18:13-27.  8. Roosevelt A, Piter D, Devi S, Jannie P, Gladys T, Domingo M, Dirk B, Didi H, Emerson R, Carol K, Gema L, Robinson DG, Manjinder D, Joe DF, Angeles MR, The Breast Cancer Susceptibility Collaboration (UK) & Radha DUPONT. DAYANA mutations that cause ataxia-telangiectasia are breast cancer susceptibility alleles. Nature Genetics. 2006;38:873-875  9. Sandoval N , Chanell Y, Faiza J, Dileep L, Dany GM , Glen ML, Isaias S, Andersen AG, Syngal S, Korey ML, Corrine J , Josh R, Porter SZ, Federico JR, Diognees VE, Timothy M, Vogelstein B, Chata N, Nicola RH, Nakia KW, and Rene AP. DAYANA mutations in patients with hereditary pancreatic cancer. Cancer Discover. 2012;2:41-46  10. Zay ROSARIO, et al. Breast-Cancer Risk in Families with Mutations in PALB2. NEJM. 2014; 371(6):497-506.  11. CHEK2 Breast Cancer Case-Control Consortium. CHEK2*1100delC and susceptibility to breast cancer: A collaborative analysis involving 10,860 breast cancer cases and 9,065 controls from 10 studies. Am J Hum Kim, 74 (2004), pp. 0862-8577  12. Zoila T, Eva S, Hortencia K, et al. Spectrum of Mutations in BRCA1, BRCA2, CHEK2, and TP53 in Families at High Risk of Breast Cancer. SERA. 2006;295(12):5689-5074.   13. Carly LREMA, Mikal BUTTS, Ran MATA, et al. Risk of breast cancer in women with a CHEK2 mutation with and without a family history of breast cancer. J Clin Oncol. 2011;29:1943-1423.  14. Ke H, Samantha E, Rich SJ, et al. Contribution of germline mutations in the RAD51B, RAD51C, and RAD51D genes to ovarian cancer in the population. J Clin Oncol. 2015;33(26):7863-1061.  Doi:10.1200/JCO.2015.61.2408.  15. Rupa T, Annalee CAMPOVERDE, Galindo P, et al. Mutations in BRIP1 confer high risk of ovarian cancer. Donna Kim. 2011;43(11):3901-1002. doi:10.1038/ng.955.

## 2021-03-16 NOTE — PATIENT INSTRUCTIONS
Assessing Cancer Risk  Only about 5-10% of cancers are thought to be due to an inherited cancer susceptibility gene.    These families often have:    Several people with the same or related types of cancer    Cancers diagnosed at a young age (before age 50)    Individuals with more than one primary cancer    Multiple generations of the family affected with cancer    Some people may be candidates for genetic testing of more than one gene.  For these families, genetic testing using a cancer panel may be offered.  These panels will test different genes known to increase the risk for breast, ovarian, uterine, and/or other cancers. All of the genes discussed below have published clinical management guidelines for individuals who are found to carry a mutation. The purpose of this handout is to serve as a brief summary of the genes analyzed by the panels used to inquire about hereditary breast and gynecologic cancer:  DAYANA, BRCA1, BRCA2, BRIP1, CDH1, CHEK2, MLH1, MSH2, MSH6, PMS2, EPCAM, PTEN, PALB2, RAD51C, RAD51D, and TP53.  ______________________________________________________________________________  Hereditary Breast and Ovarian Cancer Syndrome   (BRCA1 and BRCA2)  A single mutation in one of the copies of BRCA1 or BRCA2 increases the risk for breast and ovarian cancer, among others.  The risk for pancreatic cancer and melanoma may also be slightly increased in some families.  The chart below shows the chance that someone with a BRCA mutation would develop cancer in his or her lifetime1,2,3,4.        A person s ethnic background is also important to consider, as individuals of [place]ken[place] ancestry have a higher chance of having a BRCA gene mutation.  There are three BRCA mutations that occur more frequently in this population.    Kline Syndrome   (MLH1, MSH2, MSH6, PMS2, and EPCAM)  Currently five genes are known to cause Kline Syndrome: MLH1, MSH2, MSH6, PMS2, and EPCAM.  A single mutation in one of the  Kline Syndrome genes increases the risk for colon, endometrial, ovarian, and stomach cancers.  Other cancers that occur less commonly in Kline Syndrome include urinary tract, skin, and brain cancers.  The chart below shows the chance that a person with Kline syndrome would develop cancer in his or her lifetime5.      *Cancer risk varies depending on Kline syndrome gene found    Cowden Syndrome   (PTEN)  Cowden syndrome is a hereditary condition that increases the risk for breast, thyroid, endometrial, colon, and kidney cancer.  Cowden syndrome is caused by a mutation in the PTEN gene.  A single mutation in one of the copies of PTEN causes Cowden syndrome and increases cancer risk.  The chart below shows the chance that someone with a PTEN mutation would develop cancer in their lifetime6,7.  Other benign features seen in some individuals with Cowden syndrome include benign skin lesions (facial papules, keratoses, lipomas), learning disability, autism, thyroid nodules, colon polyps, and larger head size.      *One recent study found breast cancer risk to be increased to 85%    Li-Fraumeni Syndrome   (TP53)  Li-Fraumeni Syndrome (LFS) is a cancer predisposition syndrome caused by a mutation in the TP53 gene. A single mutation in one of the copies of TP53 increases the risk for multiple cancers. Individuals with LFS are at an increased risk for developing cancer at a young age. The lifetime risk for development of a LFS-associated cancer is 50% by age 30 and 90% by age 60.   Core Cancers: Sarcomas, Breast, Brain, Lung, Leukemias/Lymphomas, Adrenocortical carcinomas  Other Cancers: Gastrointestinal, Thyroid, Skin, Genitourinary    Hereditary Diffuse Gastric Cancer   (CDH1)  Currently, one gene is known to cause hereditary diffuse gastric cancer (HDGC): CDH1.  Individuals with HDGC are at increased risk for diffuse gastric cancer and lobular breast cancer. Of people diagnosed with HDGC, 30-50% have a mutation in the CDH1  gene.  This suggests there are likely other genes that may cause HDGC that have not been identified yet.      Lifetime Cancer Risks    General Population HDGC    Diffuse Gastric  <1% ~80%   Breast 12% 39-52%         Additional Genes  DAYANA  DAYANA is a moderate-risk breast cancer gene. Women who have a mutation in DAYANA can have between a 2-4 fold increased risk for breast cancer compared to the general population8. DAYANA mutations have also been associated with increased risk for pancreatic cancer, however an estimate of this cancer risk is not well understood9. Individuals who inherit two DAYANA mutations have a condition called ataxia-telangiectasia (AT).  This rare autosomal recessive condition affects the nervous system and immune system, and is associated with progressive cerebellar ataxia beginning in childhood.  Individuals with ataxia-telangiectasia often have a weakened immune system and have an increased risk for childhood cancers.    PALB2  Mutations in PALB2 have been shown to increase the risk of breast cancer up to 33-58% in some families; where individuals fall within this risk range is dependent upon family [id number]. PALB2 mutations have also been associated with increased risk for pancreatic cancer, although this risk has not been quantified yet.  Individuals who inherit two PALB2 mutations--one from their mother and one from their father--have a condition called Fanconi Anemia.  This rare autosomal recessive condition is associated with short stature, developmental delay, bone marrow failure, and increased risk for childhood cancers.    CHEK2   CHEK2 is a moderate-risk breast cancer gene.  Women who have a mutation in CHEK2 have around a 2-fold increased risk for breast cancer compared to the general population, and this risk may be higher depending upon family history.11,12,13 Mutations in CHEK2 have also been shown to increase the risk of a number of other cancers, including colon and prostate, however  these cancer risks are currently not well understood.    BRIP1, RAD51C and RAD51D  Mutations in BRIP1, RAD51C, and RAD51D have been shown to increase the risk of ovarian cancer and possibly female breast cancer as well14,15 .       Lifetime Cancer Risk    General Population BRIP1 RAD51C RAD51D   Ovarian 1-2% ~5-8% ~5-9% ~7-15%           Inheritance  All of the cancer syndromes reviewed above are inherited in an autosomal dominant pattern.  This means that if a parent has a mutation, each of his or her children will have a 50% chance of inheriting that same mutation.  Therefore, each child--male or female--would have a 50% chance of being at increased risk for developing cancer.      Image obtained from Genetics Home Reference, 2013     Mutations in some genes can occur de mag, which means that a person s mutation occurred for the first time in them and was not inherited from a parent.  Now that they have the mutation, however, it can be passed on to future generations.    Genetic Testing  Genetic testing involves a blood test and will look at the genetic information in the DAYANA, BRCA1, BRCA2, BRIP1, CDH1, CHEK2, MLH1, MSH2, MSH6, PMS2, EPCAM, PTEN, PALB2, RAD51C, RAD51D, and TP53 genes for any harmful mutations that are associated with increased cancer risk.  If possible, it is recommended that the person(s) who has had cancer be tested before other family members.  That person will give us the most useful information about whether or not a specific gene is associated with the cancer in the family.    Results  There are three possible results of genetic testing:    Positive--a harmful mutation was identified in one or more of the genes    Negative--no mutation was identified in any of the genes on this panel    Variant of unknown significance--a variation in one of the genes was identified, but it is unclear how this impacts cancer risk in the family    Advantages and Disadvantages   There are advantages and  disadvantages to genetic testing.    Advantages    May clarify your cancer risk    Can help you make medical decisions    May explain the cancers in your family    May give useful information to your family members (if you share your results)    Disadvantages    Possible negative emotional impact of learning about inherited cancer risk    Uncertainty in interpreting a negative test result in some situations    Possible genetic discrimination concerns (see below)    Genetic Information Nondiscrimination Act (PILAR)  PILAR is a federal law that protects individuals from health insurance or employment discrimination based on a genetic test result alone.  Although rare, there are currently no legal discrimination protections in terms of life insurance, long term care, or disability insurances.  Visit the [facility] website to learn more.    Reducing Cancer Risk  All of the genes described above have nationally recognized cancer screening guidelines that would be recommended for individuals who test positive.  In addition to increased cancer screening, surgeries may be offered or recommended to reduce cancer risk.  Recommendations are based upon an individual s genetic test result as well as their personal and family history of cancer.    Questions to Think About Regarding Genetic Testing:    What effect will the test result have on me and my relationship with my family members if I have an inherited gene mutation?  If I don t have a gene mutation?    Should I share my test results, and how will my family react to this news, which may also affect them?    Are my children ready to learn new information that may one day affect their own health?    Hereditary Cancer Resources    FORCE: Facing Our Risk of Cancer Empowered [email or web address]   Bright Pink bebrightpink.org   Li-Fraumeni Syndrome Association lfsassociation.org   PTEN World PTENworld.com   No stomach for cancer, Inc.  nostomachforcancer.org   Stomach cancer relief network Scrnet.org   Collaborative Group of the Americas on Inherited Colorectal Cancer (CGA) cgaicc.com    Cancer Care cancercare.org   American Cancer Society (ACS) cancer.org   National Cancer [place] (NCI) cancer.gov     Please call us if you have any questions or concerns.   Cancer Risk Management Program [phone] ([phone])  ? Pablo Calles, MS, [facility] [phone]  ? Brooklyn Alvarenga, MS, [facility]  [phone]  ? Lynda Matthews, MS, [facility]  [phone]  ? Isela Hernandez, MS, [facility] [phone]  ? Claudia Crystal, MS, [facility] [phone]  ? Tanya Chao, MS, [facility]  [phone]    References  1. Zay A, Berenice PDP, Luis Armando S, Ruiz RENEE, Rickey JE, Margaret JL, Henna N, Issa H, Leticia O, Yana A, Yana B, Narendra P, Mangloria S, Manjinder DM, Victoria N, Judy E, Sabas H, Dean E, Nikia J, Gronzenia J, Devonte B, Uriel H, Thorlacius S, Eerola H, Shabana H, Allyn K, Michael OP. Average risks of breast and ovarian cancer associated with BRCA1 or BRCA2 mutations detected in case series unselected for family history: a combined analysis of 222 studies. Am J Hum Kim. 2003;72:1117-30.  2. William DUPONT, Dede M, Viviana G.  BRCA1 and BRCA2 Hereditary Breast and Ovarian Cancer. Gene Reviews online. 2013.  3. Mike YC, Esther S, Genaro G, Madrigal S. Breast cancer risk among male BRCA1 and BRCA2 mutation carriers. J Natl Cancer Inst. 2007;99:1811-4.  4. Robinson KEY, Cris I, Rohith J, Barry E, Shirin ER, Yesika F. Risk of breast cancer in male BRCA2 carriers. J Med Kim. 2010;47:710-1.  5. National Comprehensive Cancer Network. Clinical practice guidelines in oncology, colorectal cancer screening. Available online (registration required). 2015.  6. Navid FUENTES, Greg J, Laury J, Cat LA, Oralia RENTERIA, Dulce C. Lifetime cancer risks in individuals with germline PTEN mutations. Clin Cancer Res. 2012;18:400-7.  7. Bety APARICIO. Cowden Syndrome: A Critical Review of the  Clinical Literature. J Kim . 2009:18:13-27.  8. Roosevelt A, Piter D, Devi S, Jannie P, Gladys T, Domingo M, Dirk B, Didi H, Emerson R, Carol K, Gema L, Robinson DG, Manjinder D, Joe DF, [place] MR, The Breast Cancer Susceptibility Collaboration () & Radha DUPONT. DAYANA mutations that cause ataxia-telangiectasia are breast cancer susceptibility alleles. Nature Genetics. 2006;38:873-875  9. Sandoval N , Chanell Y, Faiza J, Dileep L, Dany GM , Glen ML, Gallinger S, Andersen AG, Syngal S, Korey ML, Corrine J , Josh R, Porter SZ, Federico JR, Diogenes VE, Timothy M, Vopaulette B, Chata N, Nicola RH, Nakia KW, and Edgard AP. DAYANA mutations in patients with hereditary pancreatic cancer. Cancer Discover. 2012;2:41-46  10. Zay ROSARIO, et al. Breast-Cancer Risk in Families with Mutations in PALB2. NEJM. 2014; 371(6):497-506.  11. CHEK2 Breast Cancer Case-Control Consortium. CHEK2*1100delC and susceptibility to breast cancer: A collaborative analysis involving 10,860 breast cancer cases and 9,065 controls from 10 studies. Am J Hum Kim, 74 (2004), pp. 0878-2628  12. Zoila T, Eva S, Hortencia K, et al. Spectrum of Mutations in BRCA1, BRCA2, CHEK2, and TP53 in Families at High Risk of Breast Cancer. SERA. 2006;295(12):4101-1504.   13. Carly C, Mikal D, Ran A, et al. Risk of breast cancer in women with a CHEK2 mutation with and without a family history of breast cancer. J Clin Oncol. 2011;29:9428-6677.  14. Ke H, Samantha E, Rich SJ, et al. Contribution of germline mutations in the RAD51B, RAD51C, and RAD51D genes to ovarian cancer in the population. J Clin Oncol. 2015;33(26):1456-5223. Doi:10.1200/JCO.2015.61.2408.  15. Rupa T, Annalee CAMPOVERDE, Galindo P, et al. Mutations in BRIP1 confer high risk of ovarian cancer. Donna Kim. 2011;43(11):9567-9920. doi:10.1038/ng.955.

## 2021-03-29 ENCOUNTER — TELEPHONE (OUTPATIENT)
Facility: CLINIC | Age: 31
End: 2021-03-29

## 2021-03-29 NOTE — TELEPHONE ENCOUNTER
How many months has she been experiencing irregular bleeding, has only been the last two months? Agree with UPT if concerned. Is she missing any pills or taking any late? If this has been consistently irregular for over 3 months, would recommend follow-up visit in the office to discuss further.     Thank you,   Olivia Henry, DO     Occurred last month and this month.   Takes pills same time of day consistently.  RN spoke w/pt and is aware of providers recommendations.    Catie Ortiz, JOEN RN

## 2021-03-29 NOTE — TELEPHONE ENCOUNTER
Started OBP's Ortho Cyclen 10/19/20.    Pt calls w/concerns of bleeding/irregular menstrual cycle while on birth control pills.    Pt states started her period this Saturday (3/27/21), but isn't due to start her placebo week until [place] April 3.    States had irregular cycle last month and was told it was normal, but is still concerned.    Bleeding last month was heavy, however not heavy this month.      Is continuing to take her OBP daily as directed despite irregular bleeding.    Hasn't taken a pregnancy test but will consider taking one to rule out pregnancy.    Routing to provider for advice other than continuing OBP's as is.    Catie Ortiz, BSN RN

## 2021-03-29 NOTE — TELEPHONE ENCOUNTER
Reason for Call:  Other call back    Detailed comments: Patient states her birth control pill is messed up with her period and she would like to speak with a nurse to know what to do.  Please call    Phone Number Patient can be reached at: Home number on file [phone] (home) or Cell number on file:    Telephone Information:   Mobile [phone]       Best Time: any    Can we leave a detailed message on this number? YES    Call taken on 3/29/2021 at 3:16 PM by Jemima Hargrove

## 2021-03-31 ENCOUNTER — NURSE TRIAGE (OUTPATIENT)
Dept: NURSING | Facility: CLINIC | Age: 31
End: 2021-03-31

## 2021-03-31 NOTE — TELEPHONE ENCOUNTER
"Appointment with Dr Awad specialist on Monday to discuss her periods. Bleeding heavy, before her placebo pill in OBC.   Bleeding started Saturday, 4/27 and increased yesterday. Today she has saturated 5 pads since 5am (overnight pads). Tuesday she passed clots. Not pregnant. Blood donation Friday morning has been scheduled at work, she wanted to know if it was OK for her to donate? She usually donates every couple of months since she is O-type.   Her period was not due until Saturday. She feels weak.   She says she is bloated and also has diarrhea= 1 stool today. Diarrhea began Monday (x2). No diarrhea on Tuesday.   She states she has been drinking fluids so that she does not feel dehydrated.   Advised not to donate blood this Friday since she has been bleeding heavily and may be becoming anemic.   Triaged to a disposition of See PCP within 4 hrs or PCP Triage. Patient instructed to call clinic now and [facility]. If unable to reach, then referred to RONALDO abdullahi.    Alisha Gipson RN Triage Nurse Advisor 4:52 PM 3/31/2021    Additional Information    Negative: Questions mainly about emergency contraception    Negative: Taking a progestin-only birth control pill (contains only progestin; also called the \"minipill\")    Negative: [1] Abdominal pain AND [2] pregnant < 20 weeks    Negative: [1] Vaginal bleeding AND [2] pregnant < 20 weeks    Negative: [1] SEVERE abdominal pain (e.g., excruciating) AND [2] present > 1 hour    Negative: [1] SEVERE vaginal bleeding (i.e., soaking 2 pads or tampons per hour) AND [2] present 2 or more hours    Negative: Patient sounds very sick or weak to the triager    [1] MODERATE vaginal bleeding (i.e., soaking 1 pad or tampon per hour AND [2] present > 6 hours)    Protocols used: CONTRACEPTION - BIRTH CONTROL PILLS - COMBINED-A-  COVID 19 Nurse Triage Plan/Patient Instructions    Please be aware that novel coronavirus (COVID-19) may be circulating in " the community. If you develop symptoms such as fever, cough, or SOB or if you have concerns about the presence of another infection including coronavirus (COVID-19), please contact your health care provider or visit [email or web address].     Disposition/Instructions    In-Person Visit with provider recommended. Reference Visit Selection Guide.    Thank you for taking steps to prevent the spread of this virus.  o Limit your contact with others.  o Wear a simple mask to cover your cough.  o Wash your hands well and often.    Resources    [facility]: About COVID-19: [email or web address]    CDC: What to Do If You're Sick: www.cdc.gov/coronavirus/2019-ncov/about/steps-when-sick.html    CDC: Ending Home Isolation: www.cdc.gov/coronavirus/2019-ncov/hcp/disposition-in-home-patients.html     CDC: Caring for Someone: www.cdc.gov/coronavirus/2019-ncov/if-you-are-sick/care-for-someone.html     [facility]: Interim Guidance for Hospital Discharge to Home: www.health.[facility].[place]/diseases/coronavirus/hcp/hospdischarge.pdf    [facility] clinical trials (COVID-19 research studies): [email or web address]-clinical-trials     Below are the COVID-19 hotlines at the [place] Department of [facility] ([facility]). Interpreters are available.   o For health questions: Call [phone] or [phone] (7 a.m. to 7 p.m.)  o For questions about schools and childcare: Call [phone] or [phone] (7 a.m. to 7 p.m.)

## 2021-04-05 ENCOUNTER — OFFICE VISIT (OUTPATIENT)
Dept: OBGYN | Facility: CLINIC | Age: 31
End: 2021-04-05
Payer: COMMERCIAL

## 2021-04-05 VITALS
TEMPERATURE: 97.2 F | SYSTOLIC BLOOD PRESSURE: 108 MMHG | HEART RATE: 109 BPM | DIASTOLIC BLOOD PRESSURE: 78 MMHG | WEIGHT: 185.9 LBS | BODY MASS INDEX: 32.93 KG/M2

## 2021-04-05 DIAGNOSIS — N92.1 BREAKTHROUGH BLEEDING ON BIRTH CONTROL PILLS: Primary | ICD-10-CM

## 2021-04-05 LAB
HGB BLD-MCNC: 11.9 G/DL (ref 11.7–15.7)
TSH SERPL DL<=0.005 MIU/L-ACNC: 3.06 MU/L (ref 0.4–4)

## 2021-04-05 PROCEDURE — 99213 OFFICE O/P EST LOW 20 MIN: CPT | Performed by: OBSTETRICS & GYNECOLOGY

## 2021-04-05 PROCEDURE — 84443 ASSAY THYROID STIM HORMONE: CPT | Performed by: OBSTETRICS & GYNECOLOGY

## 2021-04-05 PROCEDURE — 85018 HEMOGLOBIN: CPT | Performed by: OBSTETRICS & GYNECOLOGY

## 2021-04-05 PROCEDURE — 36415 COLL VENOUS BLD VENIPUNCTURE: CPT | Performed by: OBSTETRICS & GYNECOLOGY

## 2021-04-05 RX ORDER — NORETHINDRONE ACETATE AND ETHINYL ESTRADIOL 1.5-30(21)
1 KIT ORAL DAILY
Qty: 90 TABLET | Refills: 4 | Status: SHIPPED | OUTPATIENT
Start: 2021-04-05 | End: 2021-10-15

## 2021-04-05 NOTE — PROGRESS NOTES
"  SUBJECTIVE:       HPI: Leah Cox is a 30 year old  who presents today for follow-up for AUB.     Since last visit, OCPs started 10/2020, going well and having bleeding on her sugar pill every month until February when bleeding started started 6 days prior to sugar pill. Reports light-heavy bleeding and clots at that time. Patient took her sugar pill and had continued light bleeding that week. She then started a new pack. She started bleeding again on  which was 9 days prior to her next sugar pill week. Reports bright red heavy bleeding lasting 5-6 days then  decreasing to light pink bleeding and stopped on April 3. She started her next sugar pill on . Denies spotting today but feels achy/bloating and nausea, \"like my period is going to start again\". She reports taking her pills every day and has not missed pills. She denies any bleeding before or after intercourse. She took a home pregnancy test on  which was negative. Denies pelvic pain, cramping, and urinary symptoms. She reports losing 14 lbs in the last month with weight loss program and stopped smoking cigarettes.     Menstrual history: see below note for prior history. LMP: 3/27/2021      Gyn history:  Long history irregular menstrual cycles.  Has previously been seen for this and work-up has included basic labs and imaging. Her menses have previously been regular while taking OCPs in her 20s. Menses are every 2 weeks to 2 months. Menses are light and heavy, depending on cycle. Has seen large clots with menses. Menses last about 14 days. Has a \"mustache\" that she shaves. No excessive acne. Has previously been told she may have an \"ovarian syndrome.\"    Considering pregnancy in the next 1-5 years but not ready yet.    Last pelvic ultrasound  and was normal. No ovarian cysts.    Medical history significant for hypothyroidism on Synthroid, taking regularly: yes, last TSH 3.72; tobacco use, depression    She denies " vaginal discharge, dysmenorrhea, dyspareunia. She denies fevers/chills, vomiting, abdominal pain. Denies dysuria, hematuria, constipation or diarrhea.    No history HTN. History migraines without aura.    Ob Hx:     Gyn Hx: Patient's last menstrual period was 2021.    Patient is sexually active. One male partner. No new partners in the last year.   Last pap was  NIL neg HPV, history LEEP  for CINII.  Next pap due    STI history denies  Using OCP for contraception and bleeding management. Other past methods tried: pills   STD testing offered?  Declined  Menarche 14 years old.   Family history of gyn-related malignancies: Mother had benign ovarian cysts and breast cancer x2 (dx age 17yo and 24yo). No genetic testing done.       reports that she has been smoking cigarettes. She has been smoking about 0.25 packs per day. She has never used smokeless tobacco. Recently stopped smoking.     Today's PHQ-2 Score:   PHQ-2 (  [employer]) 10/9/2020   Q1: Little interest or pleasure in doing things 1   Q2: Feeling down, depressed or hopeless 3   PHQ-2 Score 4   Q1: Little interest or pleasure in doing things Several days   Q2: Feeling down, depressed or hopeless Nearly every day   PHQ-2 Score 4     Today's PHQ-9 Score:   PHQ-9 SCORE 10/9/2020   PHQ-9 Total Score MyChart 11 (Moderate depression)   PHQ-9 Total Score 11     Today's RENAY-7 Score:   RENAY-7 SCORE 2018   Total Score 20       Problem list and histories reviewed & adjusted, as indicated.  Additional history: as documented.    Patient Active Problem List   Diagnosis     Irregular periods     Pelvic pain in female     Carpal tunnel syndrome of right wrist     Adjustment disorder with depressed mood     Excessive or frequent menstruation     Ganglion cyst of dorsum of right wrist     Metacarpal boss     Segmental dysfunction of sacral region     Bilateral low back pain with right-sided sciatica     Segmental dysfunction of lumbar region      Segmental dysfunction of thoracic region     S/P LEEP of cervix     Tobacco use disorder     Major depressive disorder, recurrent episode, moderate (H)     RENAY (generalized anxiety disorder)     Diarrhea, unspecified type     Abdominal pain, generalized     Hypothyroidism, unspecified type     Family dysfunction     Past Surgical History:   Procedure Laterality Date     LEEP TX, CERVICAL  01/23/2013    SUZAN 2 with free margins - Care Everywhere.     RELEASE CARPAL TUNNEL Right 10/5/2016    Procedure: RELEASE CARPAL TUNNEL;  Surgeon: Christian Sebastian MD;  Location:  OR      Social History     Tobacco Use     Smoking status: Current Every Day Smoker     Packs/day: 0.25     Types: Cigarettes     Smokeless tobacco: Never Used   Substance Use Topics     Alcohol use: Yes     Alcohol/week: 0.0 standard drinks     Comment: occ.      Problem (# of Occurrences) Relation (Name,Age of Onset)    Hypertension (1) Father            ibuprofen (ADVIL/MOTRIN) 200 MG capsule, Take 400 mg by mouth every 4 hours as needed for fever  levothyroxine (SYNTHROID/LEVOTHROID) 100 MCG tablet, TAKE ONE TABLET BY MOUTH ONCE DAILY  norgestimate-ethinyl estradiol (ORTHO-CYCLEN) 0.25-35 MG-MCG tablet, Take 1 tablet by mouth daily  FLUoxetine (PROZAC) 10 MG capsule, Take 1 capsule (10 mg) by mouth daily (Patient not taking: Reported on 4/5/2021)  tiZANidine (ZANAFLEX) 4 MG capsule, Take 1 capsule (4 mg) by mouth At Bedtime (Patient not taking: Reported on 10/19/2020)    No current facility-administered medications on file prior to visit.     Allergies   Allergen Reactions     Cyclobenzaprine      Started giving her dreams       ROS:  10 Point review of systems negative other noted above in HPI    OBJECTIVE:     /78   Pulse 109   Temp 97.2  F (36.2  C) (Temporal)   Wt 84.3 kg (185 lb 14.4 oz)   LMP 03/27/2021   BMI 32.93 kg/m    Body mass index is 32.93 kg/m .    GENERAL: healthy, alert and no distress  EYES: Eyes grossly normal to  inspection, conjunctivae and sclerae normal  RESP: no audible wheeze, cough, or visible cyanosis.  No visible retractions or increased work of breathing.  Able to speak fully in complete sentences.  Neck: soft, no cervical adenopathy, no masses  CV: RRR, no murmurs, no extra heart sounds, 2+ peripheral pulses  Resp: CTAB, good effort without distress   Abdomen: Soft, nondistended, nontender, no masses.   Pelvic exam: patient declined exam today  NEURO: Cranial nerves grossly intact, mentation intact and speech normal  PSYCH: mentation appears normal, affect normal/bright, judgement and insight intact, normal speech and appearance well-groomed      In-Clinic Test Results:  Results for orders placed or performed in visit on 21 (from the past 24 hour(s))   Hemoglobin   Result Value Ref Range    Hemoglobin 11.9 11.7 - 15.7 g/dL       ASSESSMENT/PLAN:                                                      Leah Cox is a 30 year old  who presents today for follow-up for AUB on OCPs      ICD-10-CM    1. Breakthrough bleeding on birth control pills  N92.1 Hemoglobin     norethindrone-ethinyl estradiol-iron (MICROGESTIN FE1.5) 1.5-30 MG-MCG tablet     TSH with free T4 reflex     US Pelvic Complete with Transvaginal      We discussed possible etiologies of breakthrough bleeding, including but not limited to endocrine or structural abnormalities. Patient refused pelvic exam today so unable to fully assess. Alternative medical therapy reviewed, including Depo Provera, patch, ring, implant and IUD. Patient strongly desires to continue OCPs, and does not want any other method at this time. Plan for labs, pelvic ultrasound and dose adjustment to OCP's. If symptoms do not improve in 6 months, recommend patient return to clinic for pelvic exam and further assessment.   All questions answered, patient in agreement.     Jessica Michaud, BSN, RN, DNP student, [facility] specialty, with the consent of the patient and in  collaboration with Dr. Henry performed the initial HPI, ROS, physical exam and acted as scribe for the remainder of the visit.     Physician Attestation:  I was present with the student who participated in the service and in the documentation of the note. I have verified the history and personally performed the physical exam and medical decision making. I agree with the assessment and plan of care as documented in the note, and have edited to reflect my findings.    Olivia Henry, [facility]

## 2021-04-09 ENCOUNTER — HOSPITAL ENCOUNTER (OUTPATIENT)
Dept: ULTRASOUND IMAGING | Facility: CLINIC | Age: 31
Discharge: HOME | End: 2021-04-09
Attending: OBSTETRICS & GYNECOLOGY | Admitting: OBSTETRICS & GYNECOLOGY
Payer: COMMERCIAL

## 2021-04-09 DIAGNOSIS — N92.1 BREAKTHROUGH BLEEDING ON BIRTH CONTROL PILLS: ICD-10-CM

## 2021-04-09 PROCEDURE — 76830 TRANSVAGINAL US NON-OB: CPT

## 2021-04-26 ENCOUNTER — TELEPHONE (OUTPATIENT)
Facility: CLINIC | Age: 31
End: 2021-04-26

## 2021-04-26 NOTE — TELEPHONE ENCOUNTER
Reason for Call:  Other call back    Detailed comments: Pt  Called to discuss meds because she started her period again and is off from when she should. Would like a call back to discuss and is frustrated that nothing is working.    Phone Number Patient can be reached at: Cell number on file:    Telephone Information:   Mobile [phone]       Best Time: any    Can we leave a detailed message on this number? YES    Call taken on 4/26/2021 at 4:59 PM by Roxanne Bhatti

## 2021-04-27 NOTE — TELEPHONE ENCOUNTER
Spoke with pt and she states she started light bleeding today and isn't due for her period yet.  She started on a new birth control pill recently.      I explained to pt that it is very common to have irregular bleeding for a few months after starting a new birth control, whether it is going from a different pill or to a different form of BC.      I advised pt give it a couple more months of taking the new BCP every day around the same time each day and if after a few months she is still experiencing irregular bleeding to call us again.    Pt verbalized understanding and agreed to plan.    Eden Graham RN

## 2021-04-27 NOTE — TELEPHONE ENCOUNTER
Per 4/5 OV plan:  We discussed possible etiologies of breakthrough bleeding, including but not limited to endocrine or structural abnormalities. Patient refused pelvic exam today so unable to fully assess. Alternative medical therapy reviewed, including Depo Provera, patch, ring, implant and IUD. Patient strongly desires to continue OCPs, and does not want any other method at this time. Plan for labs, pelvic ultrasound and dose adjustment to OCP's. If symptoms do not improve in 6 months, recommend patient return to clinic for pelvic exam and further assessment.     Unable to reach patient via phone. Left message to call clinic back at [phone].    Eden Graham RN

## 2021-05-07 ENCOUNTER — NURSE TRIAGE (OUTPATIENT)
Dept: NURSING | Facility: CLINIC | Age: 31
End: 2021-05-07

## 2021-05-07 NOTE — TELEPHONE ENCOUNTER
Patient requesting serology testing.  Is quarantining and  going to [facility] today for Covid testing as she has been exposed.  Patient will call back after the recommended 14 days.

## 2021-05-10 ENCOUNTER — TELEPHONE (OUTPATIENT)
Dept: NURSING | Facility: CLINIC | Age: 31
End: 2021-05-10

## 2021-05-10 NOTE — TELEPHONE ENCOUNTER
"Patient is calling requesting COVID serologic antibody testing.  NOTE: Serologic testing is a blood test for 'antibodies' which are made at 10-14 days after you have had symptoms of COVID or were exposed and had an asymptomatic infection.  This does NOT test you for 'active' infection or tell you if you are contagious.    Are you a healthcare worker? no  Do you currently have a cough, fever, body aches, shortness of breath, or difficulty breathing?  No  Did you previously have cough, fever, body aches, shortness of breath, or difficulty breathing that have now resolved? No previous covid symptoms.   Have you been exposed to (or come into close contact with) someone who tested POSITIVE for COVID-19 or someone who had a possible case of COVID-19?  Possible exposure 7 days ago.  Confirmed exposure < 14 days ago.  Recommend they wait for testing until it has been 14 days as it can take 2 weeks after exposure for the test to be positive.      The patient was informed: \"Testing is limited each day and it may take time for testing to be available to everyone who has called. You will receive a call within 48-72 hours to schedule the serology testing. Please confirm the best number to reach you is [phone]. If you have any questions about scheduling, call [phone].\"      Advised patient given confirmed exposure was only 7 days ago, it is recommended she wait to test until it has been 14 days. Patient agreeable and will call back in 7 days to schedule serology test.   "

## 2021-07-26 NOTE — MR AVS SNAPSHOT
"              After Visit Summary   1/3/2017    Leah Cox    MRN: [id number]           Patient Information     Date Of Birth          1990        Visit Information        Provider Department      1/3/2017 12:40 PM Christian Sebastian MD [facility]         Follow-ups after your visit        Who to contact     If you have questions or need follow up information about today's clinic visit or your schedule please contact [facility] directly at [phone].  Normal or non-critical lab and imaging results will be communicated to you by [employer], letter or phone within 4 business days after the clinic has received the results. If you do not hear from us within 7 days, please contact the clinic through [employer] or phone. If you have a critical or abnormal lab result, we will notify you by phone as soon as possible.  Submit refill requests through [employer] or call your pharmacy and they will forward the refill request to us. Please allow 3 business days for your refill to be completed.          Additional Information About Your Visit        [employer] Information     [employer] lets you send messages to your doctor, view your test results, renew your prescriptions, schedule appointments and more. To sign up, go to [email or web address] . Click on \"Log in\" on the left side of the screen, which will take you to the Welcome page. Then click on \"Sign up Now\" on the right side of the page.     You will be asked to enter the access code listed below, as well as some personal information. Please follow the directions to create your username and password.     Your access code is: [email or web address]  Expires: 4/3/2017 12:43 PM     Your access code will  in 90 days. If you need help or a new code, please call your [facility] or [phone].        Care EveryWhere ID     This is your Care EveryWhere ID. This could be used by other organizations to access your [place] medical " PATIENT CALLED STATING THAT DR. LAWRENCE WAS SUPPOSED TO SET HIM UP WITH A TEST FOR AN INNER EAR INFECTION / THERAPY. THE PATIENT STILL HAS NOT HEARD BACK ABOUT THIS AND WOULD LIKE TO CHECK THE STATUS.    PLEASE ADVISE   [phone]    "records  [id number]        Your Vitals Were     Temperature Height BMI (Body Mass Index)             98  F (36.7  C) (Temporal) 1.626 m (5' 4\") 33.46 kg/m2          Blood Pressure from Last 3 Encounters:   11/29/16 108/72   10/05/16 117/89   08/24/16 120/80    Weight from Last 3 Encounters:   01/03/17 88.451 kg (195 lb)   12/01/16 87 kg (191 lb 12.8 oz)   11/29/16 87 kg (191 lb 12.8 oz)              Today, you had the following     No orders found for display       Primary Care Provider Office Phone # Fax #    NADJA Pierce [phone]        [facility] [address]  [address] [place] [address]        Thank you!     Thank you for choosing [facility]  for your care. Our goal is always to provide you with excellent care. Hearing back from our patients is one way we can continue to improve our services. Please take a few minutes to complete the written survey that you may receive in the mail after your visit with us. Thank you!             Your Updated Medication List - Protect others around you: Learn how to safely use, store and throw away your medicines at www.disposemymeds.org.          This list is accurate as of: 1/3/17 12:43 PM.  Always use your most recent med list.                   Brand Name Dispense Instructions for use    permethrin 5 % cream    ELIMITE    60 g    Apply cream from head to toe (execpt the face); leave on for 8-14 hours before washing off with water; may reapply in 1 week if live mites appear.         "

## 2021-09-12 DIAGNOSIS — E03.9 HYPOTHYROIDISM, UNSPECIFIED TYPE: ICD-10-CM

## 2021-09-14 RX ORDER — LEVOTHYROXINE SODIUM 100 UG/1
TABLET ORAL
Qty: 90 TABLET | Refills: 0 | Status: SHIPPED | OUTPATIENT
Start: 2021-09-14 | End: 2021-12-13

## 2021-09-14 NOTE — TELEPHONE ENCOUNTER
"Sending to scheduling for yearly office visit/ establish care due    Requested Prescriptions   Pending Prescriptions Disp Refills     levothyroxine (SYNTHROID/LEVOTHROID) 100 MCG tablet [Pharmacy Med Name: LEVOTHYROXINE 100MCG TAB] 90 tablet 2     Sig: TAKE ONE TABLET BY MOUTH ONCE DAILY   10/9/2020    Thyroid Protocol Passed - 9/12/2021  5:03 AM        Passed - Patient is 12 years or older        Passed - Recent (12 mo) or future (30 days) visit within the authorizing provider's specialty     Patient has had an office visit with the authorizing provider or a provider within the authorizing providers department within the previous 12 mos or has a future within next 30 days. See \"Patient Info\" tab in inbasket, or \"Choose Columns\" in Meds & Orders section of the refill encounter.              Passed - Medication is active on med list        Passed - Normal TSH on file in past 12 months     Recent Labs   Lab Test 04/05/21  1357   TSH 3.06              Passed - No active pregnancy on record     If patient is pregnant or has had a positive pregnancy test, please check TSH.          Passed - No positive pregnancy test in past 12 months     If patient is pregnant or has had a positive pregnancy test, please check TSH.           Prescription approved per [facility] Refill Protocol.  Eveline Clemons RN          "

## 2021-10-15 ENCOUNTER — OFFICE VISIT (OUTPATIENT)
Dept: FAMILY MEDICINE | Facility: CLINIC | Age: 31
End: 2021-10-15
Payer: COMMERCIAL

## 2021-10-15 VITALS
HEART RATE: 104 BPM | DIASTOLIC BLOOD PRESSURE: 70 MMHG | RESPIRATION RATE: 16 BRPM | TEMPERATURE: 97.7 F | WEIGHT: 188 LBS | BODY MASS INDEX: 33.31 KG/M2 | OXYGEN SATURATION: 100 % | HEIGHT: 63 IN | SYSTOLIC BLOOD PRESSURE: 110 MMHG

## 2021-10-15 DIAGNOSIS — F17.200 TOBACCO USE DISORDER: ICD-10-CM

## 2021-10-15 DIAGNOSIS — F33.1 MAJOR DEPRESSIVE DISORDER, RECURRENT EPISODE, MODERATE (H): Primary | ICD-10-CM

## 2021-10-15 DIAGNOSIS — F41.1 GAD (GENERALIZED ANXIETY DISORDER): ICD-10-CM

## 2021-10-15 DIAGNOSIS — E03.9 HYPOTHYROIDISM, UNSPECIFIED TYPE: ICD-10-CM

## 2021-10-15 PROBLEM — F43.21 ADJUSTMENT DISORDER WITH DEPRESSED MOOD: Status: RESOLVED | Noted: 2017-03-03 | Resolved: 2021-10-15

## 2021-10-15 PROBLEM — R19.7 DIARRHEA, UNSPECIFIED TYPE: Status: RESOLVED | Noted: 2018-08-02 | Resolved: 2021-10-15

## 2021-10-15 PROBLEM — M99.04 SEGMENTAL DYSFUNCTION OF SACRAL REGION: Status: RESOLVED | Noted: 2018-01-31 | Resolved: 2021-10-15

## 2021-10-15 PROBLEM — R10.84 ABDOMINAL PAIN, GENERALIZED: Status: RESOLVED | Noted: 2018-08-02 | Resolved: 2021-10-15

## 2021-10-15 PROBLEM — M99.02 SEGMENTAL DYSFUNCTION OF THORACIC REGION: Status: RESOLVED | Noted: 2018-01-31 | Resolved: 2021-10-15

## 2021-10-15 PROBLEM — M99.03 SEGMENTAL DYSFUNCTION OF LUMBAR REGION: Status: RESOLVED | Noted: 2018-01-31 | Resolved: 2021-10-15

## 2021-10-15 PROBLEM — Z63.9 FAMILY DYSFUNCTION: Status: RESOLVED | Noted: 2018-11-15 | Resolved: 2021-10-15

## 2021-10-15 PROBLEM — M25.749 METACARPAL BOSS: Status: RESOLVED | Noted: 2017-07-24 | Resolved: 2021-10-15

## 2021-10-15 PROBLEM — M67.431 GANGLION CYST OF DORSUM OF RIGHT WRIST: Status: RESOLVED | Noted: 2017-07-24 | Resolved: 2021-10-15

## 2021-10-15 PROBLEM — N92.0 EXCESSIVE OR FREQUENT MENSTRUATION: Status: RESOLVED | Noted: 2017-03-14 | Resolved: 2021-10-15

## 2021-10-15 PROBLEM — M54.41 BILATERAL LOW BACK PAIN WITH RIGHT-SIDED SCIATICA: Status: RESOLVED | Noted: 2018-01-31 | Resolved: 2021-10-15

## 2021-10-15 LAB — TSH SERPL DL<=0.005 MIU/L-ACNC: 3.14 MU/L (ref 0.4–4)

## 2021-10-15 PROCEDURE — 99214 OFFICE O/P EST MOD 30 MIN: CPT | Performed by: NURSE PRACTITIONER

## 2021-10-15 PROCEDURE — 84443 ASSAY THYROID STIM HORMONE: CPT | Performed by: NURSE PRACTITIONER

## 2021-10-15 PROCEDURE — 36415 COLL VENOUS BLD VENIPUNCTURE: CPT | Performed by: NURSE PRACTITIONER

## 2021-10-15 ASSESSMENT — ANXIETY QUESTIONNAIRES
1. FEELING NERVOUS, ANXIOUS, OR ON EDGE: SEVERAL DAYS
6. BECOMING EASILY ANNOYED OR IRRITABLE: MORE THAN HALF THE DAYS
GAD7 TOTAL SCORE: 13
7. FEELING AFRAID AS IF SOMETHING AWFUL MIGHT HAPPEN: NEARLY EVERY DAY
2. NOT BEING ABLE TO STOP OR CONTROL WORRYING: NEARLY EVERY DAY
3. WORRYING TOO MUCH ABOUT DIFFERENT THINGS: NEARLY EVERY DAY
IF YOU CHECKED OFF ANY PROBLEMS ON THIS QUESTIONNAIRE, HOW DIFFICULT HAVE THESE PROBLEMS MADE IT FOR YOU TO DO YOUR WORK, TAKE CARE OF THINGS AT HOME, OR GET ALONG WITH OTHER PEOPLE: SOMEWHAT DIFFICULT
5. BEING SO RESTLESS THAT IT IS HARD TO SIT STILL: NOT AT ALL

## 2021-10-15 ASSESSMENT — MIFFLIN-ST. JEOR: SCORE: 1531.89

## 2021-10-15 ASSESSMENT — PATIENT HEALTH QUESTIONNAIRE - PHQ9
5. POOR APPETITE OR OVEREATING: SEVERAL DAYS
SUM OF ALL RESPONSES TO PHQ QUESTIONS 1-9: 12

## 2021-10-15 ASSESSMENT — PAIN SCALES - GENERAL: PAINLEVEL: NO PAIN (0)

## 2021-10-15 NOTE — PROGRESS NOTES
"    Assessment & Plan     Major depressive disorder, recurrent episode, moderate (H)  PHQ-9 is 12.  She stopped the prozac after one week.  She declines counseling.  She wants to try some more natural methods such as meeting with friends, exercise, nutrition, sleep.  Discussed good self care, sleep hygiene, mindfulness therapies and meditation.  She will let me know if she changes her mind about counseling.     Hypothyroidism, unspecified type  Stable on medication  - TSH with free T4 reflex; Future  - TSH with free T4 reflex    RENAY (generalized anxiety disorder)  RENAY is 13.  As above    Tobacco use disorder  Declines assistance with cessation today.      35 minutes spent on the date of the encounter doing chart review, review of test results, interpretation of tests, patient visit and documentation        Tobacco Cessation:   reports that she has been smoking cigarettes. She has been smoking about 0.25 packs per day. She has never used smokeless tobacco.  Tobacco Cessation Action Plan: Information offered: Patient not interested at this time    BMI:   Estimated body mass index is 33.3 kg/m  as calculated from the following:    Height as of this encounter: 1.6 m (5' 3\").    Weight as of this encounter: 85.3 kg (188 lb).   Weight management plan: Discussed healthy diet and exercise guidelines        No follow-ups on file.    Jannie Cox NP  [facility] is a 31 year old who presents for the following health issues     HPI     New Patient/Transfer of Care  Hypothyroidism Follow-up      Since last visit, patient describes the following symptoms: Weight stable, no hair loss, no skin changes, no constipation, no loose stools and fatigue      How many servings of fruits and vegetables do you eat daily?  2-3    On average, how many sweetened beverages do you drink each day (Examples: soda, juice, sweet tea, etc.  Do NOT count diet or artificially sweetened beverages)?  " " 1    How many days per week do you exercise enough to make your heart beat faster? 3 or less    How many minutes a day do you exercise enough to make your heart beat faster? 9 or less    How many days per week do you miss taking your medication? 0    RENAY/ Depression=  Was on prozac for a month.  Feels wants to try to do it without medications.  Did counseling 4 years ago.  Did one therapy session.  Has found ex-boy dec, family addicts      Review of Systems         Objective    /70   Pulse 104   Temp 97.7  F (36.5  C) (Temporal)   Resp 16   Ht 1.6 m (5' 3\")   Wt 85.3 kg (188 lb)   LMP  (LMP Unknown)   SpO2 100%   BMI 33.30 kg/m    Body mass index is 33.3 kg/m .  Physical Exam                   "

## 2021-10-16 ASSESSMENT — ANXIETY QUESTIONNAIRES: GAD7 TOTAL SCORE: 13

## 2021-12-02 NOTE — LETTER
REPORT OF WORK COMP    [address] [id number] [phone]      PATIENT DATA    Employee Name: Leah Cox      : 1990     #: [id number]    Work related injury: Yes  Employer at time of injury: [employer]   Employer contact & phone: Pat Thurman [phone] ext [phone]  Employed elsewhere? No  Workers' Compensation Carrier/Managed Care Plan:      Today's date: 20  Date of injury: 2019  Date of first visit: 19    PROVIDER EVALUATION: Please fill in as needed.  Please give copy to employee for employer.    1. Diagnosis: Sacroiliac joint pain     2. Treatment: Rest ice and heat.  3. Medication: Prednisone  NOTE: When ordering a medication, [place] Rules require Work Comp or WC on prescriptions.    4. No work from 19 to 2019.  5. Return to work date: 20  ** WITH RESTRICTIONS?  NO restrictions    RESTRICTIONS: Unlimited unless listed.  Restrictions apply to home and leisure also.  If work restrictions is not available, the employee is totally disabled.    Maximum Medical Improvement (Date): 20  Any Permanent Partial Disability? 0%  Provider comments: Had chiropractic treatment and feels back to normal now.    Medical Examiner: Sanya Ni MD            License or registration: [id number]    Next appointment: No further appointments needed    CC: Employer, Managed Care Plan/Payor, Patient  
3

## 2021-12-10 DIAGNOSIS — E03.9 HYPOTHYROIDISM, UNSPECIFIED TYPE: ICD-10-CM

## 2021-12-13 RX ORDER — LEVOTHYROXINE SODIUM 100 UG/1
TABLET ORAL
Qty: 90 TABLET | Refills: 0 | Status: SHIPPED | OUTPATIENT
Start: 2021-12-13 | End: 2022-03-10

## 2021-12-13 NOTE — TELEPHONE ENCOUNTER
Routing refill request to provider for review/approval because:  Labs not current:  TSH      Bailey Haque RN

## 2021-12-22 ENCOUNTER — TELEPHONE (OUTPATIENT)
Dept: FAMILY MEDICINE | Facility: CLINIC | Age: 31
End: 2021-12-22
Payer: COMMERCIAL

## 2021-12-22 NOTE — TELEPHONE ENCOUNTER
Patient is asking for a letter of exemption for mask wearing due to her asthma and anxiety.  She states that they are going to have to start wearing masks at work again and is concerned as they have made her hyperventilate in the past.    Please contact patient: [phone], message can be left    Lluvia ROSA/

## 2021-12-22 NOTE — TELEPHONE ENCOUNTER
[facility] as an organization does not do exemption letters for masks.  Jannie Cox, CNP

## 2022-03-10 DIAGNOSIS — E03.9 HYPOTHYROIDISM, UNSPECIFIED TYPE: ICD-10-CM

## 2022-03-10 RX ORDER — LEVOTHYROXINE SODIUM 100 UG/1
100 TABLET ORAL DAILY
Qty: 90 TABLET | Refills: 0 | Status: SHIPPED | OUTPATIENT
Start: 2022-03-10 | End: 2022-05-08

## 2022-05-03 ENCOUNTER — VIRTUAL VISIT (OUTPATIENT)
Dept: FAMILY MEDICINE | Facility: CLINIC | Age: 32
End: 2022-05-03
Payer: COMMERCIAL

## 2022-05-03 DIAGNOSIS — J06.9 VIRAL URI: Primary | ICD-10-CM

## 2022-05-03 DIAGNOSIS — E03.9 HYPOTHYROIDISM, UNSPECIFIED TYPE: ICD-10-CM

## 2022-05-03 DIAGNOSIS — Z86.16 HISTORY OF COVID-19: ICD-10-CM

## 2022-05-03 PROCEDURE — 99213 OFFICE O/P EST LOW 20 MIN: CPT | Mod: TEL | Performed by: FAMILY MEDICINE

## 2022-05-03 NOTE — PROGRESS NOTES
"Virginia is a 31 year old who is being evaluated via a billable telephone visit.      What phone number would you like to be contacted at?  How would you like to obtain your AVS? Mail a copy    Assessment & Plan     Viral upper respiratory infection: Recommend ongoing symptomatic management.  Okay to use over-the-counter cough suppressants and mucolytics.  Okay for acetaminophen or ibuprofen as needed.  Make sure she is drinking plenty of fluids and staying hydrated.  If symptoms still are not improving over the next 1 to 2 weeks, recommend follow-up in clinic for further evaluation.    Hypothyroidism, unspecified type: Recheck TSH and adjust levothyroxine dose if needed.  - TSH with free T4 reflex; Future    History of COVID-19: As both stools, urine and even work have metallic stent, I wonder if this is an effect of COVID.  Continue monitoring but if not improving, would warrant further evaluation     BMI:   Estimated body mass index is 33.3 kg/m  as calculated from the following:    Height as of 10/15/21: 1.6 m (5' 3\").    Weight as of 10/15/21: 85.3 kg (188 lb).       No follow-ups on file.    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  Unsure if it was strep  Therapies tried and outcome: mucus meds have helped some, Advil for headache    Hypothyroidism Follow-up      Since last visit, patient describes the following symptoms: weight gain " of 10 lbs and fatigue.  Has recently been eating more carbs and fatty foods    Changed the time of day she takes her levothyroxine and not sure if that is causing side effects       History of COVID-19: Diagnosed with COVID-19 on October 17, 2021.  Since then, she has noticed that her bowel movements, urine among other things will smell of metal.     Review of Systems   Constitutional, HEENT, cardiovascular, pulmonary, gi and gu systems are negative, except as otherwise noted.      Objective           Vitals:  No vitals were obtained today due to virtual visit.    Physical Exam   healthy, alert and no distress  PSYCH: Alert and oriented times 3; coherent speech, normal   rate and volume, able to articulate logical thoughts, able   to abstract reason, no tangential thoughts, no hallucinations   or delusions  Her affect is normal  RESP: No cough, no audible wheezing, able to talk in full sentences  Remainder of exam unable to be completed due to telephone visits          Phone call duration: 19 minutes

## 2022-05-04 ENCOUNTER — LAB (OUTPATIENT)
Dept: LAB | Facility: CLINIC | Age: 32
End: 2022-05-04
Payer: COMMERCIAL

## 2022-05-04 DIAGNOSIS — E03.9 HYPOTHYROIDISM, UNSPECIFIED TYPE: ICD-10-CM

## 2022-05-04 LAB
T4 FREE SERPL-MCNC: 1.18 NG/DL (ref 0.76–1.46)
TSH SERPL DL<=0.005 MIU/L-ACNC: 5.25 MU/L (ref 0.4–4)

## 2022-05-04 PROCEDURE — 36415 COLL VENOUS BLD VENIPUNCTURE: CPT

## 2022-05-04 PROCEDURE — 84439 ASSAY OF FREE THYROXINE: CPT

## 2022-05-04 PROCEDURE — 84443 ASSAY THYROID STIM HORMONE: CPT

## 2022-05-08 DIAGNOSIS — E03.9 HYPOTHYROIDISM, UNSPECIFIED TYPE: ICD-10-CM

## 2022-05-08 RX ORDER — LEVOTHYROXINE SODIUM 112 UG/1
112 TABLET ORAL DAILY
Qty: 90 TABLET | Refills: 0 | Status: SHIPPED | OUTPATIENT
Start: 2022-05-08 | End: 2022-08-02

## 2022-06-03 ENCOUNTER — OFFICE VISIT (OUTPATIENT)
Dept: FAMILY MEDICINE | Facility: CLINIC | Age: 32
End: 2022-06-03
Payer: COMMERCIAL

## 2022-06-03 VITALS
BODY MASS INDEX: 34.24 KG/M2 | HEART RATE: 80 BPM | OXYGEN SATURATION: 99 % | TEMPERATURE: 97.6 F | DIASTOLIC BLOOD PRESSURE: 68 MMHG | SYSTOLIC BLOOD PRESSURE: 100 MMHG | WEIGHT: 193.31 LBS

## 2022-06-03 DIAGNOSIS — R10.84 ABDOMINAL PAIN, GENERALIZED: Primary | ICD-10-CM

## 2022-06-03 DIAGNOSIS — R19.7 DIARRHEA, UNSPECIFIED TYPE: ICD-10-CM

## 2022-06-03 LAB
ALBUMIN SERPL-MCNC: 3 G/DL (ref 3.4–5)
ALBUMIN UR-MCNC: NEGATIVE MG/DL
ALP SERPL-CCNC: 46 U/L (ref 40–150)
ALT SERPL W P-5'-P-CCNC: 36 U/L (ref 0–50)
ANION GAP SERPL CALCULATED.3IONS-SCNC: 4 MMOL/L (ref 3–14)
APPEARANCE UR: CLEAR
AST SERPL W P-5'-P-CCNC: 20 U/L (ref 0–45)
BASOPHILS # BLD AUTO: 0.1 10E3/UL (ref 0–0.2)
BASOPHILS NFR BLD AUTO: 1 %
BILIRUB SERPL-MCNC: 0.3 MG/DL (ref 0.2–1.3)
BILIRUB UR QL STRIP: NEGATIVE
BUN SERPL-MCNC: 10 MG/DL (ref 7–30)
CALCIUM SERPL-MCNC: 8.3 MG/DL (ref 8.5–10.1)
CHLORIDE BLD-SCNC: 110 MMOL/L (ref 94–109)
CO2 SERPL-SCNC: 28 MMOL/L (ref 20–32)
COLOR UR AUTO: YELLOW
CREAT SERPL-MCNC: 0.71 MG/DL (ref 0.52–1.04)
CRP SERPL-MCNC: <2.9 MG/L (ref 0–8)
EOSINOPHIL # BLD AUTO: 0.2 10E3/UL (ref 0–0.7)
EOSINOPHIL NFR BLD AUTO: 2 %
ERYTHROCYTE [DISTWIDTH] IN BLOOD BY AUTOMATED COUNT: 13.1 % (ref 10–15)
GFR SERPL CREATININE-BSD FRML MDRD: >90 ML/MIN/1.73M2
GLUCOSE BLD-MCNC: 96 MG/DL (ref 70–99)
GLUCOSE UR STRIP-MCNC: NEGATIVE MG/DL
HCT VFR BLD AUTO: 42.8 % (ref 35–47)
HGB BLD-MCNC: 14.9 G/DL (ref 11.7–15.7)
HGB UR QL STRIP: NEGATIVE
IMM GRANULOCYTES # BLD: 0.2 10E3/UL
IMM GRANULOCYTES NFR BLD: 2 %
KETONES UR STRIP-MCNC: NEGATIVE MG/DL
LEUKOCYTE ESTERASE UR QL STRIP: NEGATIVE
LIPASE SERPL-CCNC: 78 U/L (ref 73–393)
LYMPHOCYTES # BLD AUTO: 3.1 10E3/UL (ref 0.8–5.3)
LYMPHOCYTES NFR BLD AUTO: 33 %
MCH RBC QN AUTO: 31.2 PG (ref 26.5–33)
MCHC RBC AUTO-ENTMCNC: 34.8 G/DL (ref 31.5–36.5)
MCV RBC AUTO: 90 FL (ref 78–100)
MONOCYTES # BLD AUTO: 0.6 10E3/UL (ref 0–1.3)
MONOCYTES NFR BLD AUTO: 6 %
NEUTROPHILS # BLD AUTO: 5.3 10E3/UL (ref 1.6–8.3)
NEUTROPHILS NFR BLD AUTO: 56 %
NITRATE UR QL: NEGATIVE
NRBC # BLD AUTO: 0 10E3/UL
NRBC BLD AUTO-RTO: 0 /100
PH UR STRIP: 7 [PH] (ref 5–7)
PLATELET # BLD AUTO: 293 10E3/UL (ref 150–450)
POTASSIUM BLD-SCNC: 3.8 MMOL/L (ref 3.4–5.3)
PROT SERPL-MCNC: 5.6 G/DL (ref 6.8–8.8)
RBC # BLD AUTO: 4.77 10E6/UL (ref 3.8–5.2)
SODIUM SERPL-SCNC: 142 MMOL/L (ref 133–144)
SP GR UR STRIP: 1.01 (ref 1–1.03)
UROBILINOGEN UR STRIP-MCNC: NORMAL MG/DL
WBC # BLD AUTO: 9.4 10E3/UL (ref 4–11)

## 2022-06-03 PROCEDURE — 81003 URINALYSIS AUTO W/O SCOPE: CPT | Performed by: FAMILY MEDICINE

## 2022-06-03 PROCEDURE — 85025 COMPLETE CBC W/AUTO DIFF WBC: CPT | Performed by: FAMILY MEDICINE

## 2022-06-03 PROCEDURE — 36415 COLL VENOUS BLD VENIPUNCTURE: CPT | Performed by: FAMILY MEDICINE

## 2022-06-03 PROCEDURE — 99214 OFFICE O/P EST MOD 30 MIN: CPT | Performed by: FAMILY MEDICINE

## 2022-06-03 PROCEDURE — 80053 COMPREHEN METABOLIC PANEL: CPT | Performed by: FAMILY MEDICINE

## 2022-06-03 PROCEDURE — 83690 ASSAY OF LIPASE: CPT | Performed by: FAMILY MEDICINE

## 2022-06-03 PROCEDURE — 86140 C-REACTIVE PROTEIN: CPT | Performed by: FAMILY MEDICINE

## 2022-06-03 ASSESSMENT — PATIENT HEALTH QUESTIONNAIRE - PHQ9
SUM OF ALL RESPONSES TO PHQ QUESTIONS 1-9: 10
SUM OF ALL RESPONSES TO PHQ QUESTIONS 1-9: 10

## 2022-06-03 ASSESSMENT — PAIN SCALES - GENERAL: PAINLEVEL: MODERATE PAIN (5)

## 2022-06-03 NOTE — PROGRESS NOTES
Assessment & Plan     Abdominal pain, generalized  Feels bloated especially after eating.  She also has some left lower costochondral area rib discomfort with deep breathing.  She has had trouble with abdominal pain in the past and has had somewhat of work-up and saw [facility].  They put her on hycosamine and fiber.  She had COVID last October and ever since then she has had more problems.  We will get lab work CT scan of the abdomen and pelvis with contrast and will consider colonoscopy.  Consider repeat upper quadrant ultrasound.  - CBC with platelets and differential; Future  - Comprehensive metabolic panel (BMP + Alb, Alk Phos, ALT, AST, Total. Bili, TP); Future  - Lipase; Future  - CRP, inflammation; Future  - UA Macro with Reflex to Micro and Culture - lab collect; Future  - CT Abdomen Pelvis w Contrast; Future  - CBC with platelets and differential  - Comprehensive metabolic panel (BMP + Alb, Alk Phos, ALT, AST, Total. Bili, TP)  - Lipase  - CRP, inflammation  - UA Macro with Reflex to Micro and Culture - lab collect    Diarrhea, unspecified type  Foul-smelling stools and she states her urine smells the same with a metallic smell.  Loose to mushy stools.  Sometimes watery.  As stated above she is [facility] back in 2018.  She did have a gallbladder ultrasound done at that time and it was fine.  No study since then.  We are going to get a CT scan of the abdomen and pelvis with contrast in possibly set her up for colonoscopy.  - CBC with platelets and differential; Future  - Comprehensive metabolic panel (BMP + Alb, Alk Phos, ALT, AST, Total. Bili, TP); Future  - Lipase; Future  - CRP, inflammation; Future  - UA Macro with Reflex to Micro and Culture - lab collect; Future  - CT Abdomen Pelvis w Contrast; Future  - CBC with platelets and differential  - Comprehensive metabolic panel (BMP + Alb, Alk Phos, ALT, AST, Total. Bili, TP)  - Lipase  - CRP, inflammation  - UA Macro with  "Reflex to Micro and Culture - lab collect             BMI:   Estimated body mass index is 34.24 kg/m  as calculated from the following:    Height as of 10/15/21: 1.6 m (5' 3\").    Weight as of this encounter: 87.7 kg (193 lb 5 oz).   Weight management plan: Discussed healthy diet and exercise guidelines        No follow-ups on file.    Sanya Ni MD  [facility] is a 31 year old who presents for the following health issues : See discussion above in assessment and plan.  This is been going on for a long time.  Years.  No recent changes in medications.  She did have COVID last fall and feels things have worsened since then.  Describes an unusual situation of her urine and stool smell the same with a metallic smell and she has some belching that can smell the same.  Says she has had some darker stools.    History of Present Illness       Reason for visit:  Stomach issues/ strained muscle maybe on left side rib area    She eats 2-3 servings of fruits and vegetables daily.She consumes 1 sweetened beverage(s) daily.She exercises with enough effort to increase her heart rate 9 or less minutes per day.  She exercises with enough effort to increase her heart rate 4 days per week.   She is taking medications regularly.    Today's PHQ-9         PHQ-9 Total Score: 10    PHQ-9 Q9 Thoughts of better off dead/self-harm past 2 weeks :   Not at all                 Review of Systems   Constitutional, HEENT, cardiovascular, pulmonary, gi and gu systems are negative, except as otherwise noted.      Objective    /68   Pulse 80   Temp 97.6  F (36.4  C) (Temporal)   Wt 87.7 kg (193 lb 5 oz)   SpO2 99%   BMI 34.24 kg/m    Body mass index is 34.24 kg/m .  Physical Exam   GENERAL: healthy, alert and no distress  EYES: Eyes grossly normal to inspection, PERRL and conjunctivae and sclerae normal  RESP: lungs clear to auscultation - no rales, rhonchi or wheezes  CV: regular rate " and rhythm, normal S1 S2, no S3 or S4, no murmur, click or rub, no peripheral edema and peripheral pulses strong  ABDOMEN: bowel sounds normal and some tenderness mostly in the epigastric area.  No distention at this time.  No masses noted.  Also some tenderness on the left costochondral margin area.  MS: no gross musculoskeletal defects noted, no edema  SKIN: no suspicious lesions or rashes    Results for orders placed or performed in visit on 06/03/22 (from the past 24 hour(s))   CBC with platelets and differential    Narrative    The following orders were created for panel order CBC with platelets and differential.  Procedure                               Abnormality         Status                     ---------                               -----------         ------                     CBC with platelets and d...[[id number]]                      Final result                 Please view results for these tests on the individual orders.   Comprehensive metabolic panel (BMP + Alb, Alk Phos, ALT, AST, Total. Bili, TP)   Result Value Ref Range    Sodium 142 133 - 144 mmol/L    Potassium 3.8 3.4 - 5.3 mmol/L    Chloride 110 (H) 94 - 109 mmol/L    Carbon Dioxide (CO2) 28 20 - 32 mmol/L    Anion Gap 4 3 - 14 mmol/L    Urea Nitrogen 10 7 - 30 mg/dL    Creatinine 0.71 0.52 - 1.04 mg/dL    Calcium 8.3 (L) 8.5 - 10.1 mg/dL    Glucose 96 70 - 99 mg/dL    Alkaline Phosphatase 46 40 - 150 U/L    AST 20 0 - 45 U/L    ALT 36 0 - 50 U/L    Protein Total 5.6 (L) 6.8 - 8.8 g/dL    Albumin 3.0 (L) 3.4 - 5.0 g/dL    Bilirubin Total 0.3 0.2 - 1.3 mg/dL    GFR Estimate >90 >60 mL/min/1.73m2   Lipase   Result Value Ref Range    Lipase 78 73 - 393 U/L   CRP, inflammation   Result Value Ref Range    CRP Inflammation <2.9 0.0 - 8.0 mg/L   CBC with platelets and differential   Result Value Ref Range    WBC Count 9.4 4.0 - 11.0 10e3/uL    RBC Count 4.77 3.80 - 5.20 10e6/uL    Hemoglobin 14.9 11.7 - 15.7 g/dL    Hematocrit 42.8 35.0 - 47.0 %     MCV 90 78 - 100 fL    MCH 31.2 26.5 - 33.0 pg    MCHC 34.8 31.5 - 36.5 g/dL    RDW 13.1 10.0 - 15.0 %    Platelet Count 293 150 - 450 10e3/uL    % Neutrophils 56 %    % Lymphocytes 33 %    % Monocytes 6 %    % Eosinophils 2 %    % Basophils 1 %    % Immature Granulocytes 2 %    NRBCs per 100 WBC 0 <1 /100    Absolute Neutrophils 5.3 1.6 - 8.3 10e3/uL    Absolute Lymphocytes 3.1 0.8 - 5.3 10e3/uL    Absolute Monocytes 0.6 0.0 - 1.3 10e3/uL    Absolute Eosinophils 0.2 0.0 - 0.7 10e3/uL    Absolute Basophils 0.1 0.0 - 0.2 10e3/uL    Absolute Immature Granulocytes 0.2 <=0.4 10e3/uL    Absolute NRBCs 0.0 10e3/uL   UA Macro with Reflex to Micro and Culture - lab collect    Specimen: Urine, Midstream   Result Value Ref Range    Color Urine Yellow Colorless, Straw, Light Yellow, Yellow    Appearance Urine Clear Clear    Glucose Urine Negative Negative mg/dL    Bilirubin Urine Negative Negative    Ketones Urine Negative Negative mg/dL    Specific Gravity Urine 1.014 1.003 - 1.035    Blood Urine Negative Negative    pH Urine 7.0 5.0 - 7.0    Protein Albumin Urine Negative Negative mg/dL    Urobilinogen Urine Normal Normal, 2.0 mg/dL    Nitrite Urine Negative Negative    Leukocyte Esterase Urine Negative Negative    Narrative    Microscopic not indicated

## 2022-06-06 ENCOUNTER — HOSPITAL ENCOUNTER (OUTPATIENT)
Dept: CT IMAGING | Facility: CLINIC | Age: 32
Discharge: HOME | End: 2022-06-06
Attending: FAMILY MEDICINE | Admitting: FAMILY MEDICINE
Payer: COMMERCIAL

## 2022-06-06 DIAGNOSIS — R10.84 ABDOMINAL PAIN, GENERALIZED: ICD-10-CM

## 2022-06-06 DIAGNOSIS — R19.7 DIARRHEA, UNSPECIFIED TYPE: ICD-10-CM

## 2022-06-06 PROCEDURE — 74177 CT ABD & PELVIS W/CONTRAST: CPT

## 2022-06-06 PROCEDURE — 250N000011 HC RX IP 250 OP 636: Performed by: FAMILY MEDICINE

## 2022-06-06 PROCEDURE — 250N000009 HC RX 250: Performed by: FAMILY MEDICINE

## 2022-06-06 RX ORDER — IOPAMIDOL 755 MG/ML
500 INJECTION, SOLUTION INTRAVASCULAR ONCE
Status: COMPLETED | OUTPATIENT
Start: 2022-06-06 | End: 2022-06-06

## 2022-06-06 RX ADMIN — IOPAMIDOL 95 ML: 755 INJECTION, SOLUTION INTRAVENOUS at 08:51

## 2022-06-06 RX ADMIN — SODIUM CHLORIDE 60 ML: 9 INJECTION, SOLUTION INTRAVENOUS at 08:51

## 2022-06-07 ENCOUNTER — TELEPHONE (OUTPATIENT)
Dept: FAMILY MEDICINE | Facility: CLINIC | Age: 32
End: 2022-06-07
Payer: COMMERCIAL

## 2022-06-07 DIAGNOSIS — R10.84 ABDOMINAL PAIN, GENERALIZED: Primary | ICD-10-CM

## 2022-06-07 DIAGNOSIS — R19.7 DIARRHEA, UNSPECIFIED TYPE: ICD-10-CM

## 2022-06-07 NOTE — LETTER
6/7/22        To whom it may concern,    Virginia Aurora Kenny had an increase in her levothyroxine to 112 mcg daily, starting 5/8/22.  If you have any further questions or concerns, please call [phone].      Sincerely.      Jannie Cox NP

## 2022-06-07 NOTE — TELEPHONE ENCOUNTER
Patient is calling to states she has been donating plasma and since she has had a change in Synthroid dose, she needs a note stating it has been 1 month from the most recent increase in dosage.    Labs were completed on 5/4/22 and increased in Synthroid was on 5/8/22.  Note is pending for signature.    Patient will need this faxed to [facility] in [place] at [phone].    Routing to PCP (this was changed by a virtual provider).  Shabnam Calix, JOEN, RN

## 2022-06-07 NOTE — TELEPHONE ENCOUNTER
LMTC  Please inform patient and help schedule       Message  Received: Yesterday  Sanya Ni MD  [facility]  Let her know her CT scan of the abdomen showed no specific abnormality that would be causing discomfort or problems.  Neck step is still get her set up for a gallbladder ultrasound to be done here and also set her up for a colonoscopy with our surgeons here.  Reason is for diarrhea

## 2022-06-08 ENCOUNTER — TELEPHONE (OUTPATIENT)
Dept: GASTROENTEROLOGY | Facility: CLINIC | Age: 32
End: 2022-06-08
Payer: COMMERCIAL

## 2022-06-08 NOTE — TELEPHONE ENCOUNTER
Screening Questions  BlueKIND OF PREP RedLOCATION [review exclusion criteria] GreenSEDATION TYPE      1. Are you able to give consent for your medical care? YES Do you have a legal guardian or medical Power of ?   (Sedation review/consideration needed)    2. Have you had a positive covid test in the last 90 days? NO  a. If yes, what date?    3. Are you active on mychart? NO    4. What insurance is in the chart? PREFERREDONE     3.   Ordering/Referring Provider:     4. BMI 33.7 [BMI OVER 40-EXTENDED PREP]  If greater than 40 review exclusion criteria [PAC APPT IF @ [facility]]        5.  Respiratory Screening :  [If yes to any of the following HOSPITAL setting only]     Do you use daily home oxygen? NO    Do you have mod to severe Obstructive Sleep Apnea? NO  [OKAY @ [facility] SH PH RI]   Do you have Pulmonary Hypertension? NO     Do you have UNCONTROLLED asthma? NO        6.   Have you had a heart or lung transplant? NO      7.   Are you currently on dialysis? NO [ If yes, G-PREP & HOSPITAL setting only]     8.   Do you have chronic kidney disease? NO [ If yes, G-PREP ]    9.   Have you had a stroke or Transient ischemic attack (TIA - aka  mini stroke ) within 6 months?  NO (If yes, please review exclusion criteria)    10.   In the past 6 months, have you had any heart related issues including cardiomyopathy or heart attack? NO           If yes, did it require cardiac stenting or other implantable device?       11.   Do you have any implantable devices in your body (pacemaker, defib, LVAD)? NO (If yes, please review exclusion criteria)    12.   Do you take nitroglycerin? NO           If yes, how often?   (if yes, HOSPITAL setting ONLY)    13.   Are you currently taking any blood thinners? NO           [IF YES, INFORM PATIENT TO FOLLOW UP W/ ORDERING PROVIDER FOR BRIDGING INSTRUCTIONS]     14.   Do you have a diagnosis of diabetes? NO   [ If yes, G-PREP ]    15.   [FEMALES] Are you currently pregnant? NO    If  yes, how many weeks?     16.   Are you taking any prescription pain medications on a routine schedule?  NO  [ If yes, EXTENDED PREP.] [If yes, MAC]    17.   Do you have any chemical dependencies such as alcohol, street drugs, or methadone?  NO [If yes, MAC]    18.   Do you have any history of post-traumatic stress syndrome, severe anxiety or history of psychosis?  YES  [If yes, MAC]    19.   Do you transfer independently?  YES    20.  On a regular basis do you go 3-5 days between bowel movements? NO   [ If yes, EXTENDED PREP.]    21.   Preferred LOCAL Pharmacy for Pre Prescription      [facility] [address] [facility]      Scheduling Details      Caller : VIRGINIA  (Please ask for phone number if not scheduled by patient)    Type of Procedure Scheduled: COLONOSCOPY  Which Colonoscopy Prep was Sent?: MIRTERESO COTTO CF PATIENTS & GROEN'S PATIENTS REQUIRE EXTENDED PREP  Surgeon: TRAY  Date of Procedure: 08/18  Location: [place]      Sedation Type: MAC  Conscious Sedation- Needs  for 6 hours after the procedure  MAC/General-Needs  for 24 hours after procedure    Pre-op Required at [facility], [place], [place] and OR for MAC sedation: NO  (advise patient they will need a pre-op prior to procedure -)      Informed patient they will need an adult  YES  Cannot take any type of public or medical transportation alone    Pre-Procedure Covid test to be completed at [facility] or Externally: YES    Confirmed Nurse will call to complete assessment YES    Additional comments: NA

## 2022-06-13 RX ORDER — LEVOTHYROXINE SODIUM 100 UG/1
TABLET ORAL
Qty: 90 TABLET | Refills: 0 | OUTPATIENT
Start: 2022-06-13

## 2022-06-13 NOTE — TELEPHONE ENCOUNTER
Prescription was sent 5/8/22 for #90 with 0 refills.  Pharmacy notified via E-Prescribe refusal.    Job care preemployment physical exam

## 2022-06-15 ENCOUNTER — TELEPHONE (OUTPATIENT)
Dept: FAMILY MEDICINE | Facility: CLINIC | Age: 32
End: 2022-06-15
Payer: COMMERCIAL

## 2022-06-15 NOTE — TELEPHONE ENCOUNTER
Called and left message letting pt know she does need to go to ultrasound.    Stepan Leal, MILESN

## 2022-06-15 NOTE — TELEPHONE ENCOUNTER
Reason for Call:  Other call back    Detailed comments: patient called wondering if she still needs US sound since she just had a CT     Phone Number Patient can be reached at: Cell number on file:    Telephone Information:   Mobile [phone]       Best Time: any    Can we leave a detailed message on this number? YES    Call taken on 6/15/2022 at 10:38 AM by Michelle Rouse

## 2022-06-22 ENCOUNTER — HOSPITAL ENCOUNTER (OUTPATIENT)
Dept: ULTRASOUND IMAGING | Facility: CLINIC | Age: 32
Discharge: HOME | End: 2022-06-22
Attending: FAMILY MEDICINE | Admitting: FAMILY MEDICINE
Payer: COMMERCIAL

## 2022-06-22 DIAGNOSIS — R10.84 ABDOMINAL PAIN, GENERALIZED: ICD-10-CM

## 2022-06-22 DIAGNOSIS — R19.7 DIARRHEA, UNSPECIFIED TYPE: ICD-10-CM

## 2022-06-22 PROCEDURE — 76705 ECHO EXAM OF ABDOMEN: CPT

## 2022-06-23 ENCOUNTER — TELEPHONE (OUTPATIENT)
Dept: FAMILY MEDICINE | Facility: CLINIC | Age: 32
End: 2022-06-23

## 2022-06-23 DIAGNOSIS — R10.84 ABDOMINAL PAIN, GENERALIZED: Primary | ICD-10-CM

## 2022-06-23 NOTE — TELEPHONE ENCOUNTER
----- Message from Sanya Ni MD sent at 6/23/2022  3:10 PM CDT -----  Gallbladder ultrasound showed normal gallbladder.  Again noted is on the CT scan from earlier in the month was some fattiness in the liver.  But this would not be causing pain and usually is of not much significance.  Somewhat diet related.  Your pancreas looked normal.  This on the CAT scan being normal I am not sure what is going on.  Further evaluation with general surgery might be the next step.

## 2022-06-23 NOTE — TELEPHONE ENCOUNTER
Spoke with patient and informed of note. Please place referral for general surgery per patient.     Namrata Lane MA

## 2022-07-07 ENCOUNTER — OFFICE VISIT (OUTPATIENT)
Dept: SURGERY | Facility: CLINIC | Age: 32
End: 2022-07-07
Attending: FAMILY MEDICINE
Payer: COMMERCIAL

## 2022-07-07 VITALS
BODY MASS INDEX: 33.66 KG/M2 | WEIGHT: 190 LBS | SYSTOLIC BLOOD PRESSURE: 122 MMHG | DIASTOLIC BLOOD PRESSURE: 82 MMHG | TEMPERATURE: 97.8 F

## 2022-07-07 DIAGNOSIS — R10.84 ABDOMINAL PAIN, GENERALIZED: ICD-10-CM

## 2022-07-07 PROCEDURE — 99203 OFFICE O/P NEW LOW 30 MIN: CPT | Performed by: SURGERY

## 2022-07-07 ASSESSMENT — PAIN SCALES - GENERAL: PAINLEVEL: NO PAIN (0)

## 2022-07-07 NOTE — PROGRESS NOTES
Patient seen in consultation for bloating and abdominal pain    HPI:  Patient is a 31 year old female with several months history of postprandial bloating and abdominal pain.  She states that the pain and bloating is worse at night.  Occasionally she experiences heartburn symptoms and takes something for it periodically.  She denies any prior history of abdominal surgery.  She had similar issues in the past of her gastrointestinal tract with greasy stools and was worked up by a gastroenterologist who did not find a source.  She is scheduled with me for colonoscopy next month.  She has had a CT and an ultrasound which have showed fatty liver without any other abnormality.  Lab work-up has also been unremarkable.    Review Of Systems    Skin: negative  Ears/Nose/Throat: negative  Respiratory: No shortness of breath, dyspnea on exertion, cough, or hemoptysis  Cardiovascular: negative  Gastrointestinal: as above  Genitourinary: negative  Musculoskeletal: negative  Neurologic: negative  Hematologic/Lymphatic/Immunologic: negative  Endocrine: negative      Past Medical History:   Diagnosis Date     ASCUS with positive high risk HPV cervical 09/26/2012     Excessive or frequent menstruation 3/14/2017     H/O colposcopy with cervical biopsy 11/07/2012    SUZAN 2-3     S/P LEEP of cervix 01/23/2013    SUZAN 2 with free margins       Past Surgical History:   Procedure Laterality Date     LEEP TX, CERVICAL  01/23/2013    SUZAN 2 with free margins - Care Everywhere.     RELEASE CARPAL TUNNEL Right 10/5/2016    Procedure: RELEASE CARPAL TUNNEL;  Surgeon: Christian Sebastian MD;  Location: [facility] OR       Family History   Problem Relation Age of Onset     Hypertension Father        Social History     Socioeconomic History     Marital status: Single     Spouse name: Not on file     Number of children: Not on file     Years of education: Not on file     Highest education level: Not on file   Occupational History     Not on file   Tobacco  Use     Smoking status: Former Smoker     Packs/day: 0.25     Types: Cigarettes     Quit date: 2022     Years since quittin.0     Smokeless tobacco: Never Used   Vaping Use     Vaping Use: Never used   Substance and Sexual Activity     Alcohol use: Yes     Alcohol/week: 0.0 standard drinks     Comment: occ.     Drug use: No     Sexual activity: Yes     Partners: Male     Birth control/protection: OCP   Other Topics Concern     Parent/sibling w/ CABG, MI or angioplasty before 65F 55M? Not Asked   Social History Narrative     Not on file     Social Determinants of Health     Financial Resource Strain: Not on file   Food Insecurity: Not on file   Transportation Needs: Not on file   Physical Activity: Not on file   Stress: Not on file   Social Connections: Not on file   Intimate Partner Violence: Not on file   Housing Stability: Not on file       Current Outpatient Medications   Medication Sig Dispense Refill     levothyroxine (SYNTHROID/LEVOTHROID) 112 MCG tablet Take 1 tablet (112 mcg) by mouth daily 90 tablet 0     ibuprofen (ADVIL/MOTRIN) 200 MG capsule Take 400 mg by mouth every 4 hours as needed for fever (Patient not taking: Reported on 2022)         Medications and history reviewed    Physical exam:  Vitals: /82   Temp 97.8  F (36.6  C) (Temporal)   Wt 86.2 kg (190 lb)   BMI 33.66 kg/m    BMI= Body mass index is 33.66 kg/m .    Constitutional: Healthy, alert, non-distressed   Head: Normo-cephalic, atraumatic, no lesions, masses or tenderness   Cardiovascular: RRR, no new murmurs, +S1, +S2 heart sounds, no clicks, rubs or gallops   Respiratory: CTAB, no rales, rhonchi or wheezing, equal chest rise, good respiratory effort   Gastrointestinal: Soft, non-tender, non distended, no rebound rigidity or guarding, no masses or hernias palpated   : Deferred  Musculoskeletal: Moves all extremities, normal  strength, no deformities noted   Skin: No suspicious lesions or rashes   Psychiatric:  Mentation appears normal, affect appropriate   Hematologic/Lymphatic/Immunologic: Normal cervical and supraclavicular lymph nodes   Patient able to get up on table without difficulty.    Labs show:  No results found for this or any previous visit (from the past 24 hour(s)).    Imaging shows:  Recent Results (from the past 744 hour(s))   US Abdomen Limited    Narrative    US ABDOMEN LIMITED 6/22/2022 1:23 PM    CLINICAL HISTORY: Abdominal pain, diarrhea; Abdominal pain,  generalized; Diarrhea, unspecified type.    TECHNIQUE: Limited abdominal ultrasound.    COMPARISON: CT abdomen and pelvis dated 6/6/2022. Abdominal ultrasound  dated 8/17/2018.    FINDINGS: Mildly limited study due to overlying bowel gas.    GALLBLADDER: The gallbladder is normal. No gallstones, wall  thickening, or pericholecystic fluid. Negative sonographic Galarza's  sign.    BILE DUCTS: There is no biliary dilatation. The common duct measures  2mm.    LIVER: Liver is mildly diffusely hyperechogenic most consistent with  diffuse fatty infiltration. No focal intrahepatic lesion or biliary  ductal dilatation.    RIGHT KIDNEY: No hydronephrosis.    PANCREAS: The visualized portions of the pancreas are normal.    Visualized abdominal aorta and proximal inferior vena cava are grossly  within normal limits.    No ascites.      Impression    IMPRESSION:  1.  Diffuse hepatic steatosis is again noted. No focal intrahepatic  lesion.  2.  No other significant abnormalities are identified on this study.  No other etiology for patient's symptoms is seen. There is no evidence  for cholelithiasis or acute cholecystitis.    SHANNON MOTA MD         SYSTEM ID:  [id number]        Assessment:     ICD-10-CM    1. Abdominal pain, generalized  R10.84 Adult General Surg Referral     NM Hepatobiliary Scan w GB EF     Plan: I recommend HIDA scan and due to the postprandial nature to her symptoms.  We will get this scheduled.  We we will also perform the colonoscopy as  scheduled in August.  Should the HIDA scan not be helpful I may refer to gastroenterology even prior to colonoscopy to get her on the schedule.  She understands and agrees to the plan    30 minutes spent on the date of the encounter doing chart review, history and exam, documentation and further activities per the note    Edwardo Madison, DO

## 2022-07-07 NOTE — LETTER
7/7/2022         RE: Leah Cox  [address]  [facility] [address]        Dear Colleague,    Thank you for referring your patient, Leah Cox, to the [facility]. Please see a copy of my visit note below.    Patient seen in consultation for bloating and abdominal pain    HPI:  Patient is a 31 year old female with several months history of postprandial bloating and abdominal pain.  She states that the pain and bloating is worse at night.  Occasionally she experiences heartburn symptoms and takes something for it periodically.  She denies any prior history of abdominal surgery.  She had similar issues in the past of her gastrointestinal tract with greasy stools and was worked up by a gastroenterologist who did not find a source.  She is scheduled with me for colonoscopy next month.  She has had a CT and an ultrasound which have showed fatty liver without any other abnormality.  Lab work-up has also been unremarkable.    Review Of Systems    Skin: negative  Ears/Nose/Throat: negative  Respiratory: No shortness of breath, dyspnea on exertion, cough, or hemoptysis  Cardiovascular: negative  Gastrointestinal: as above  Genitourinary: negative  Musculoskeletal: negative  Neurologic: negative  Hematologic/Lymphatic/Immunologic: negative  Endocrine: negative      Past Medical History:   Diagnosis Date     ASCUS with positive high risk HPV cervical 09/26/2012     Excessive or frequent menstruation 3/14/2017     H/O colposcopy with cervical biopsy 11/07/2012    SUZAN 2-3     S/P LEEP of cervix 01/23/2013    SUZAN 2 with free margins       Past Surgical History:   Procedure Laterality Date     LEEP TX, CERVICAL  01/23/2013    SUZAN 2 with free margins - Care Everywhere.     RELEASE CARPAL TUNNEL Right 10/5/2016    Procedure: RELEASE CARPAL TUNNEL;  Surgeon: Christian Sebastian MD;  Location: [facility] OR       Family History   Problem Relation Age of Onset     Hypertension Father        Social  History     Socioeconomic History     Marital status: Single     Spouse name: Not on file     Number of children: Not on file     Years of education: Not on file     Highest education level: Not on file   Occupational History     Not on file   Tobacco Use     Smoking status: Former Smoker     Packs/day: 0.25     Types: Cigarettes     Quit date: 2022     Years since quittin.0     Smokeless tobacco: Never Used   Vaping Use     Vaping Use: Never used   Substance and Sexual Activity     Alcohol use: Yes     Alcohol/week: 0.0 standard drinks     Comment: occ.     Drug use: No     Sexual activity: Yes     Partners: Male     Birth control/protection: OCP   Other Topics Concern     Parent/sibling w/ CABG, MI or angioplasty before 65F 55M? Not Asked   Social History Narrative     Not on file     Social Determinants of Health     Financial Resource Strain: Not on file   Food Insecurity: Not on file   Transportation Needs: Not on file   Physical Activity: Not on file   Stress: Not on file   Social Connections: Not on file   Intimate Partner Violence: Not on file   Housing Stability: Not on file       Current Outpatient Medications   Medication Sig Dispense Refill     levothyroxine (SYNTHROID/LEVOTHROID) 112 MCG tablet Take 1 tablet (112 mcg) by mouth daily 90 tablet 0     ibuprofen (ADVIL/MOTRIN) 200 MG capsule Take 400 mg by mouth every 4 hours as needed for fever (Patient not taking: Reported on 2022)         Medications and history reviewed    Physical exam:  Vitals: /82   Temp 97.8  F (36.6  C) (Temporal)   Wt 86.2 kg (190 lb)   BMI 33.66 kg/m    BMI= Body mass index is 33.66 kg/m .    Constitutional: Healthy, alert, non-distressed   Head: Normo-cephalic, atraumatic, no lesions, masses or tenderness   Cardiovascular: RRR, no new murmurs, +S1, +S2 heart sounds, no clicks, rubs or gallops   Respiratory: CTAB, no rales, rhonchi or wheezing, equal chest rise, good respiratory effort   Gastrointestinal:  Soft, non-tender, non distended, no rebound rigidity or guarding, no masses or hernias palpated   : Deferred  Musculoskeletal: Moves all extremities, normal  strength, no deformities noted   Skin: No suspicious lesions or rashes   Psychiatric: Mentation appears normal, affect appropriate   Hematologic/Lymphatic/Immunologic: Normal cervical and supraclavicular lymph nodes   Patient able to get up on table without difficulty.    Labs show:  No results found for this or any previous visit (from the past 24 hour(s)).    Imaging shows:  Recent Results (from the past 744 hour(s))   US Abdomen Limited    Narrative    US ABDOMEN LIMITED 6/22/2022 1:23 PM    CLINICAL HISTORY: Abdominal pain, diarrhea; Abdominal pain,  generalized; Diarrhea, unspecified type.    TECHNIQUE: Limited abdominal ultrasound.    COMPARISON: CT abdomen and pelvis dated 6/6/2022. Abdominal ultrasound  dated 8/17/2018.    FINDINGS: Mildly limited study due to overlying bowel gas.    GALLBLADDER: The gallbladder is normal. No gallstones, wall  thickening, or pericholecystic fluid. Negative sonographic Galarza's  sign.    BILE DUCTS: There is no biliary dilatation. The common duct measures  2mm.    LIVER: Liver is mildly diffusely hyperechogenic most consistent with  diffuse fatty infiltration. No focal intrahepatic lesion or biliary  ductal dilatation.    RIGHT KIDNEY: No hydronephrosis.    PANCREAS: The visualized portions of the pancreas are normal.    Visualized abdominal aorta and proximal inferior vena cava are grossly  within normal limits.    No ascites.      Impression    IMPRESSION:  1.  Diffuse hepatic steatosis is again noted. No focal intrahepatic  lesion.  2.  No other significant abnormalities are identified on this study.  No other etiology for patient's symptoms is seen. There is no evidence  for cholelithiasis or acute cholecystitis.    SHANNON MOTA MD         SYSTEM ID:  [id number]        Assessment:     ICD-10-CM    1. Abdominal  pain, generalized  R10.84 Adult General Surg Referral     NM Hepatobiliary Scan w GB EF     Plan: I recommend HIDA scan and due to the postprandial nature to her symptoms.  We will get this scheduled.  We we will also perform the colonoscopy as scheduled in August.  Should the HIDA scan not be helpful I may refer to gastroenterology even prior to colonoscopy to get her on the schedule.  She understands and agrees to the plan    30 minutes spent on the date of the encounter doing chart review, history and exam, documentation and further activities per the note    Edwardo Madison, DO        Again, thank you for allowing me to participate in the care of your patient.        Sincerely,        Edwardo Madison, DO

## 2022-07-13 ENCOUNTER — HOSPITAL ENCOUNTER (OUTPATIENT)
Dept: NUCLEAR MEDICINE | Facility: CLINIC | Age: 32
Setting detail: NUCLEAR MEDICINE
Discharge: HOME | End: 2022-07-13
Attending: SURGERY | Admitting: SURGERY
Payer: COMMERCIAL

## 2022-07-13 DIAGNOSIS — R10.84 ABDOMINAL PAIN, GENERALIZED: ICD-10-CM

## 2022-07-13 PROCEDURE — 258N000003 HC RX IP 258 OP 636: Performed by: SURGERY

## 2022-07-13 PROCEDURE — 78227 HEPATOBIL SYST IMAGE W/DRUG: CPT

## 2022-07-13 PROCEDURE — 343N000001 HC RX 343: Performed by: SURGERY

## 2022-07-13 PROCEDURE — 250N000011 HC RX IP 250 OP 636: Performed by: SURGERY

## 2022-07-13 PROCEDURE — A9537 TC99M MEBROFENIN: HCPCS | Performed by: SURGERY

## 2022-07-13 RX ORDER — KIT FOR THE PREPARATION OF TECHNETIUM TC 99M MEBROFENIN 45 MG/10ML
5 INJECTION, POWDER, LYOPHILIZED, FOR SOLUTION INTRAVENOUS ONCE
Status: COMPLETED | OUTPATIENT
Start: 2022-07-13 | End: 2022-07-13

## 2022-07-13 RX ADMIN — SODIUM CHLORIDE 1.7 MCG: 9 INJECTION INTRAMUSCULAR; INTRAVENOUS; SUBCUTANEOUS at 11:48

## 2022-07-13 RX ADMIN — MEBROFENIN 6.1 MILLICURIE: 45 INJECTION, POWDER, LYOPHILIZED, FOR SOLUTION INTRAVENOUS at 11:01

## 2022-07-14 ENCOUNTER — TELEPHONE (OUTPATIENT)
Dept: SURGERY | Facility: CLINIC | Age: 32
End: 2022-07-14

## 2022-07-14 NOTE — TELEPHONE ENCOUNTER
Reason for Call:  Other appointment    Detailed comments: Patient calling because she would like somebody to further explain her results. She talked to Dr. Madison earlier but she is a little confused. If somebody could please call her back. Told her it may be Friday that we call her and she was ok with this.     Phone Number Patient can be reached at: Home number on file [phone] (home)    Best Time: any    Can we leave a detailed message on this number? YES    Call taken on 7/14/2022 at 4:40 PM by Shabnam Urbano

## 2022-07-15 NOTE — TELEPHONE ENCOUNTER
Patients questions answered, patient would like to follow up in clinic with Dr MALDONADO to discuss imaging and possible need for surgery . Scheduled 7/20/22 @ 1115      Aleida Calles on 7/15/2022 at 8:27 AM

## 2022-07-20 ENCOUNTER — TELEPHONE (OUTPATIENT)
Dept: SURGERY | Facility: CLINIC | Age: 32
End: 2022-07-20

## 2022-07-20 ENCOUNTER — OFFICE VISIT (OUTPATIENT)
Dept: SURGERY | Facility: CLINIC | Age: 32
End: 2022-07-20
Payer: COMMERCIAL

## 2022-07-20 VITALS
DIASTOLIC BLOOD PRESSURE: 60 MMHG | TEMPERATURE: 97.5 F | HEIGHT: 63 IN | SYSTOLIC BLOOD PRESSURE: 94 MMHG | BODY MASS INDEX: 33.49 KG/M2 | WEIGHT: 189 LBS

## 2022-07-20 DIAGNOSIS — K82.8 BILIARY DYSKINESIA: Primary | ICD-10-CM

## 2022-07-20 PROCEDURE — 99213 OFFICE O/P EST LOW 20 MIN: CPT | Performed by: SURGERY

## 2022-07-20 NOTE — LETTER
2022         RE: Leah Cox  [address]  [facility] [address]        Dear Colleague,    Thank you for referring your patient, Leah Cox, to the [facility]. Please see a copy of my visit note below.    General Surgery Follow Up    Pt returns for follow up visit for biliary dyskinesia    HPI:  [place] returns to discuss HIDA results.  We discussed how this demonstrated biliary dyskinesia as well as reproduction of her pretest symptoms.  We had already discussed over the phone that I thought cholecystectomy might be of benefit for her.  She had questions about the image study as well as details of surgery.    Review Of Systems    Skin: negative  Ears/Nose/Throat: negative  Respiratory: No shortness of breath, dyspnea on exertion, cough, or hemoptysis  Cardiovascular: negative  Gastrointestinal: as above  Genitourinary: negative  Musculoskeletal: negative  Neurologic: negative  Hematologic/Lymphatic/Immunologic: negative  Endocrine: negative      Past Medical History:   Diagnosis Date     ASCUS with positive high risk HPV cervical 2012     Excessive or frequent menstruation 3/14/2017     H/O colposcopy with cervical biopsy 2012    SUZAN 2-3     S/P LEEP of cervix 2013    SUZAN 2 with free margins       Past Surgical History:   Procedure Laterality Date     LEEP TX, CERVICAL  2013    SUZAN 2 with free margins - Care Everywhere.     RELEASE CARPAL TUNNEL Right 10/5/2016    Procedure: RELEASE CARPAL TUNNEL;  Surgeon: Christian Sebastian MD;  Location:  OR       Social History     Socioeconomic History     Marital status: Single     Spouse name: Not on file     Number of children: Not on file     Years of education: Not on file     Highest education level: Not on file   Occupational History     Not on file   Tobacco Use     Smoking status: Former Smoker     Packs/day: 0.25     Types: Cigarettes     Quit date: 2022     Years since quittin.0      "Smokeless tobacco: Never Used   Vaping Use     Vaping Use: Never used   Substance and Sexual Activity     Alcohol use: Yes     Alcohol/week: 0.0 standard drinks     Comment: occ.     Drug use: No     Sexual activity: Yes     Partners: Male     Birth control/protection: OCP   Other Topics Concern     Parent/sibling w/ CABG, MI or angioplasty before 65F 55M? Not Asked   Social History Narrative     Not on file     Social Determinants of Health     Financial Resource Strain: Not on file   Food Insecurity: Not on file   Transportation Needs: Not on file   Physical Activity: Not on file   Stress: Not on file   Social Connections: Not on file   Intimate Partner Violence: Not on file   Housing Stability: Not on file       Current Outpatient Medications   Medication Sig Dispense Refill     ibuprofen (ADVIL/MOTRIN) 200 MG capsule Take 400 mg by mouth every 4 hours as needed for fever (Patient not taking: Reported on 7/7/2022)       levothyroxine (SYNTHROID/LEVOTHROID) 112 MCG tablet Take 1 tablet (112 mcg) by mouth daily 90 tablet 0       Medications and history reviewed    Physical exam:  Vitals: BP 94/60   Temp 97.5  F (36.4  C) (Temporal)   Ht 1.6 m (5' 3\")   Wt 85.7 kg (189 lb)   BMI 33.48 kg/m    BMI= Body mass index is 33.48 kg/m .    Constitutional: Healthy, alert, non-distressed   Head: Normo-cephalic, atraumatic, no lesions, masses or tenderness   Cardiovascular: RRR, no new murmurs, +S1, +S2 heart sounds, no clicks, rubs or gallops   Respiratory: CTAB, no rales, rhonchi or wheezing, equal chest rise, good respiratory effort   Gastrointestinal: Soft, non-tender, non distended, no rebound rigidity or guarding, no masses or hernias palpated   : Deferred  Musculoskeletal: Moves all extremities, normal  strength, no deformities noted   Skin: No suspicious lesions or rashes   Psychiatric: Mentation appears normal, affect appropriate   Hematologic/Lymphatic/Immunologic: Normal cervical and supraclavicular lymph " nodes   Patient able to get up on table without difficulty.      Labs show:  None new    Imaging shows:  Recent Results (from the past 744 hour(s))   US Abdomen Limited    Narrative    US ABDOMEN LIMITED 6/22/2022 1:23 PM    CLINICAL HISTORY: Abdominal pain, diarrhea; Abdominal pain,  generalized; Diarrhea, unspecified type.    TECHNIQUE: Limited abdominal ultrasound.    COMPARISON: CT abdomen and pelvis dated 6/6/2022. Abdominal ultrasound  dated 8/17/2018.    FINDINGS: Mildly limited study due to overlying bowel gas.    GALLBLADDER: The gallbladder is normal. No gallstones, wall  thickening, or pericholecystic fluid. Negative sonographic Galarza's  sign.    BILE DUCTS: There is no biliary dilatation. The common duct measures  2mm.    LIVER: Liver is mildly diffusely hyperechogenic most consistent with  diffuse fatty infiltration. No focal intrahepatic lesion or biliary  ductal dilatation.    RIGHT KIDNEY: No hydronephrosis.    PANCREAS: The visualized portions of the pancreas are normal.    Visualized abdominal aorta and proximal inferior vena cava are grossly  within normal limits.    No ascites.      Impression    IMPRESSION:  1.  Diffuse hepatic steatosis is again noted. No focal intrahepatic  lesion.  2.  No other significant abnormalities are identified on this study.  No other etiology for patient's symptoms is seen. There is no evidence  for cholelithiasis or acute cholecystitis.    SHANNON MOTA MD         SYSTEM ID:  [id number]   NM Hepatobiliary Scan w GB EF    Narrative    NM HEPATOBILIARY SCAN WITH GB EF   7/13/2022 12:03 PM     TECHNIQUE:  6.1 mCi of technetium 99m labeled Mebrofenin were  intravenously given while dynamically imaging the right upper abdomen.   Approximately one hour later, 1.7 mcg of cholecystokinin (CCK) was  intravenously given over 12 minutes while evaluating the gallbladder  ejection fraction.      HISTORY: Post-prandial bloating, diarrhea; Abdominal  "pain,  generalized.    COMPARISON:   Nuclear Study: None.    Other Relevant Studies: Limited abdominal ultrasound dated 6/22/2022.  CT abdomen and pelvis dated 6/6/2022.    FINDINGS:  There is normal, homogeneous uptake of radiotracer in the  liver.  The gallbladder is seen by the 10 minute image and the small  bowel is seen by the 10 minute image.    After the administration of CCK, a gallbladder ejection fraction of  27% was measured. The patient described severe abdominal cramping  during the CCK infusion, similar to her \"pretest\" symptoms. Patient  described her pain level at 10/10 severity with the infusion.        Impression    IMPRESSION:  1. No common bile or cystic duct obstruction is seen.   2. Abnormally low gallbladder ejection fraction of 27%. Normal range  is greater than or equal to 35%. Gallbladder dyskinesis can be  associated with chronic or acalculous cholecystitis.  3. Patient described significant pain with cholecystitis infusion,  similar to her \"pretest symptoms\".    SHANNON MOTA MD         SYSTEM ID:  [id number]       Assessment:     ICD-10-CM    1. Biliary dyskinesia  K82.8 Case Request: CHOLECYSTECTOMY, LAPAROSCOPIC, possible open     Case Request: CHOLECYSTECTOMY, LAPAROSCOPIC, possible open     Plan: I recommend laparoscopic cholecystectomy possible open for her biliary dyskinesia. Discussed imaging findings and what to expect with surgery. Risks of bleeding, infection, anesthesia complications, conversion to open, injury to nearby structures and bile duct injury all discussed.   We went over my discharge instructions and post-op restrictions.  Patient questions answered and we will schedule the procedure.    Edwardo Madison DO        Again, thank you for allowing me to participate in the care of your patient.        Sincerely,        Edwardo Madison, DO    "

## 2022-07-20 NOTE — TELEPHONE ENCOUNTER
Type of surgery: CHOLECYSTECTOMY, LAPAROSCOPIC, possible open    Location of surgery: [facility]  Date and time of surgery: 7/29  Surgeon: Los  Pre-Op Appt Date: checking with pcp  Post-Op Appt Date: 8/5   Packet sent out: Yes  Pre-cert/Authorization completed:  Not Applicable  Date: na

## 2022-07-20 NOTE — TELEPHONE ENCOUNTER
Patient needs a preop prior to 7/29.  PCP Jannie Cox-  Is any provider able to work her in?  Please call patient with date/time.    Thank you!

## 2022-07-20 NOTE — PROGRESS NOTES
General Surgery Follow Up    Pt returns for follow up visit for biliary dyskinesia    HPI:  [place]ia returns to discuss HIDA results.  We discussed how this demonstrated biliary dyskinesia as well as reproduction of her pretest symptoms.  We had already discussed over the phone that I thought cholecystectomy might be of benefit for her.  She had questions about the image study as well as details of surgery.    Review Of Systems    Skin: negative  Ears/Nose/Throat: negative  Respiratory: No shortness of breath, dyspnea on exertion, cough, or hemoptysis  Cardiovascular: negative  Gastrointestinal: as above  Genitourinary: negative  Musculoskeletal: negative  Neurologic: negative  Hematologic/Lymphatic/Immunologic: negative  Endocrine: negative      Past Medical History:   Diagnosis Date     ASCUS with positive high risk HPV cervical 2012     Excessive or frequent menstruation 3/14/2017     H/O colposcopy with cervical biopsy 2012    SUZAN 2-3     S/P LEEP of cervix 2013    SUZAN 2 with free margins       Past Surgical History:   Procedure Laterality Date     LEEP TX, CERVICAL  2013    SUZAN 2 with free margins - Care Everywhere.     RELEASE CARPAL TUNNEL Right 10/5/2016    Procedure: RELEASE CARPAL TUNNEL;  Surgeon: Christian Sebastian MD;  Location:  OR       Social History     Socioeconomic History     Marital status: Single     Spouse name: Not on file     Number of children: Not on file     Years of education: Not on file     Highest education level: Not on file   Occupational History     Not on file   Tobacco Use     Smoking status: Former Smoker     Packs/day: 0.25     Types: Cigarettes     Quit date: 2022     Years since quittin.0     Smokeless tobacco: Never Used   Vaping Use     Vaping Use: Never used   Substance and Sexual Activity     Alcohol use: Yes     Alcohol/week: 0.0 standard drinks     Comment: occ.     Drug use: No     Sexual activity: Yes     Partners: Male     Birth  "control/protection: OCP   Other Topics Concern     Parent/sibling w/ CABG, MI or angioplasty before 65F 55M? Not Asked   Social History Narrative     Not on file     Social Determinants of Health     Financial Resource Strain: Not on file   Food Insecurity: Not on file   Transportation Needs: Not on file   Physical Activity: Not on file   Stress: Not on file   Social Connections: Not on file   Intimate Partner Violence: Not on file   Housing Stability: Not on file       Current Outpatient Medications   Medication Sig Dispense Refill     ibuprofen (ADVIL/MOTRIN) 200 MG capsule Take 400 mg by mouth every 4 hours as needed for fever (Patient not taking: Reported on 7/7/2022)       levothyroxine (SYNTHROID/LEVOTHROID) 112 MCG tablet Take 1 tablet (112 mcg) by mouth daily 90 tablet 0       Medications and history reviewed    Physical exam:  Vitals: BP 94/60   Temp 97.5  F (36.4  C) (Temporal)   Ht 1.6 m (5' 3\")   Wt 85.7 kg (189 lb)   BMI 33.48 kg/m    BMI= Body mass index is 33.48 kg/m .    Constitutional: Healthy, alert, non-distressed   Head: Normo-cephalic, atraumatic, no lesions, masses or tenderness   Cardiovascular: RRR, no new murmurs, +S1, +S2 heart sounds, no clicks, rubs or gallops   Respiratory: CTAB, no rales, rhonchi or wheezing, equal chest rise, good respiratory effort   Gastrointestinal: Soft, non-tender, non distended, no rebound rigidity or guarding, no masses or hernias palpated   : Deferred  Musculoskeletal: Moves all extremities, normal  strength, no deformities noted   Skin: No suspicious lesions or rashes   Psychiatric: Mentation appears normal, affect appropriate   Hematologic/Lymphatic/Immunologic: Normal cervical and supraclavicular lymph nodes   Patient able to get up on table without difficulty.      Labs show:  None new    Imaging shows:  Recent Results (from the past 744 hour(s))   US Abdomen Limited    Narrative    US ABDOMEN LIMITED 6/22/2022 1:23 PM    CLINICAL HISTORY: " Abdominal pain, diarrhea; Abdominal pain,  generalized; Diarrhea, unspecified type.    TECHNIQUE: Limited abdominal ultrasound.    COMPARISON: CT abdomen and pelvis dated 6/6/2022. Abdominal ultrasound  dated 8/17/2018.    FINDINGS: Mildly limited study due to overlying bowel gas.    GALLBLADDER: The gallbladder is normal. No gallstones, wall  thickening, or pericholecystic fluid. Negative sonographic Galarza's  sign.    BILE DUCTS: There is no biliary dilatation. The common duct measures  2mm.    LIVER: Liver is mildly diffusely hyperechogenic most consistent with  diffuse fatty infiltration. No focal intrahepatic lesion or biliary  ductal dilatation.    RIGHT KIDNEY: No hydronephrosis.    PANCREAS: The visualized portions of the pancreas are normal.    Visualized abdominal aorta and proximal inferior vena cava are grossly  within normal limits.    No ascites.      Impression    IMPRESSION:  1.  Diffuse hepatic steatosis is again noted. No focal intrahepatic  lesion.  2.  No other significant abnormalities are identified on this study.  No other etiology for patient's symptoms is seen. There is no evidence  for cholelithiasis or acute cholecystitis.    SHANNON MOTA MD         SYSTEM ID:  [id number]   NM Hepatobiliary Scan w GB EF    Narrative    NM HEPATOBILIARY SCAN WITH GB EF   7/13/2022 12:03 PM     TECHNIQUE:  6.1 mCi of technetium 99m labeled Mebrofenin were  intravenously given while dynamically imaging the right upper abdomen.   Approximately one hour later, 1.7 mcg of cholecystokinin (CCK) was  intravenously given over 12 minutes while evaluating the gallbladder  ejection fraction.      HISTORY: Post-prandial bloating, diarrhea; Abdominal pain,  generalized.    COMPARISON:   Nuclear Study: None.    Other Relevant Studies: Limited abdominal ultrasound dated 6/22/2022.  CT abdomen and pelvis dated 6/6/2022.    FINDINGS:  There is normal, homogeneous uptake of radiotracer in the  liver.  The gallbladder is seen  "by the 10 minute image and the small  bowel is seen by the 10 minute image.    After the administration of CCK, a gallbladder ejection fraction of  27% was measured. The patient described severe abdominal cramping  during the CCK infusion, similar to her \"pretest\" symptoms. Patient  described her pain level at 10/10 severity with the infusion.        Impression    IMPRESSION:  1. No common bile or cystic duct obstruction is seen.   2. Abnormally low gallbladder ejection fraction of 27%. Normal range  is greater than or equal to 35%. Gallbladder dyskinesis can be  associated with chronic or acalculous cholecystitis.  3. Patient described significant pain with cholecystitis infusion,  similar to her \"pretest symptoms\".    SHANNON MOTA MD         SYSTEM ID:  [id number]       Assessment:     ICD-10-CM    1. Biliary dyskinesia  K82.8 Case Request: CHOLECYSTECTOMY, LAPAROSCOPIC, possible open     Case Request: CHOLECYSTECTOMY, LAPAROSCOPIC, possible open     Plan: I recommend laparoscopic cholecystectomy possible open for her biliary dyskinesia. Discussed imaging findings and what to expect with surgery. Risks of bleeding, infection, anesthesia complications, conversion to open, injury to nearby structures and bile duct injury all discussed.   We went over my discharge instructions and post-op restrictions.  Patient questions answered and we will schedule the procedure.    Edwardo Madison, DO    "

## 2022-07-28 ENCOUNTER — OFFICE VISIT (OUTPATIENT)
Dept: FAMILY MEDICINE | Facility: CLINIC | Age: 32
End: 2022-07-28
Payer: COMMERCIAL

## 2022-07-28 ENCOUNTER — MYC MEDICAL ADVICE (OUTPATIENT)
Dept: FAMILY MEDICINE | Facility: CLINIC | Age: 32
End: 2022-07-28

## 2022-07-28 VITALS
WEIGHT: 187 LBS | TEMPERATURE: 98.1 F | OXYGEN SATURATION: 98 % | DIASTOLIC BLOOD PRESSURE: 62 MMHG | HEIGHT: 63 IN | SYSTOLIC BLOOD PRESSURE: 108 MMHG | BODY MASS INDEX: 33.13 KG/M2 | HEART RATE: 82 BPM

## 2022-07-28 DIAGNOSIS — K82.8 BILIARY DYSKINESIA: ICD-10-CM

## 2022-07-28 DIAGNOSIS — F33.1 MAJOR DEPRESSIVE DISORDER, RECURRENT EPISODE, MODERATE (H): ICD-10-CM

## 2022-07-28 DIAGNOSIS — Z01.818 PREOP GENERAL PHYSICAL EXAM: Primary | ICD-10-CM

## 2022-07-28 DIAGNOSIS — E03.9 HYPOTHYROIDISM, UNSPECIFIED TYPE: ICD-10-CM

## 2022-07-28 LAB — TSH SERPL DL<=0.005 MIU/L-ACNC: 3.03 MU/L (ref 0.4–4)

## 2022-07-28 PROCEDURE — 36415 COLL VENOUS BLD VENIPUNCTURE: CPT | Performed by: NURSE PRACTITIONER

## 2022-07-28 PROCEDURE — 84443 ASSAY THYROID STIM HORMONE: CPT | Performed by: NURSE PRACTITIONER

## 2022-07-28 PROCEDURE — 99214 OFFICE O/P EST MOD 30 MIN: CPT | Performed by: NURSE PRACTITIONER

## 2022-07-28 PROCEDURE — 96127 BRIEF EMOTIONAL/BEHAV ASSMT: CPT | Performed by: NURSE PRACTITIONER

## 2022-07-28 ASSESSMENT — PATIENT HEALTH QUESTIONNAIRE - PHQ9
SUM OF ALL RESPONSES TO PHQ QUESTIONS 1-9: 6
10. IF YOU CHECKED OFF ANY PROBLEMS, HOW DIFFICULT HAVE THESE PROBLEMS MADE IT FOR YOU TO DO YOUR WORK, TAKE CARE OF THINGS AT HOME, OR GET ALONG WITH OTHER PEOPLE: SOMEWHAT DIFFICULT
SUM OF ALL RESPONSES TO PHQ QUESTIONS 1-9: 6

## 2022-07-28 ASSESSMENT — PAIN SCALES - GENERAL: PAINLEVEL: NO PAIN (0)

## 2022-07-28 NOTE — PROGRESS NOTES
[address] [address]  Phone: [phone]  Fax: [phone]  Primary Provider: Demetra Alfaro  Pre-op Performing Provider: DEMETRA ALFARO      PREOPERATIVE EVALUATION:  Today's date: 7/28/2022    Leah Alfaro is a 31 year old female who presents for a preoperative evaluation.    Surgical Information:  Surgery/Procedure: CHOLECYSTECTOMY, LAPAROSCOPIC, possible open  Surgery Location: [facility]  Surgeon: Dr. Madison  Surgery Date: 07/29/2022  Time of Surgery: 1:30  Where patient plans to recover: At home with family  Fax number for surgical facility: Note does not need to be faxed, will be available electronically in Epic.    Type of Anesthesia Anticipated: General    Assessment & Plan     The proposed surgical procedure is considered INTERMEDIATE risk.    Preop general physical exam  pre-op physical to evaluate for anesthesia and its lauren-operative risks.      Biliary dyskinesia  Followed by General Surgery- planning surgery    Major depressive disorder, recurrent episode, moderate (H)  PHQ-9 is 6.  Declines medications.  Discussed good self care, sleep hygiene, mindfulness therapies and meditation.    - MI BEHAV ASSMT W/SCORE & DOCD/STAND INSTRUMENT    Hypothyroidism, unspecified type  Check labs today.  stable  - TSH with free T4 reflex; Future  - TSH with free T4 reflex         Risks and Recommendations:  The patient has the following additional risks and recommendations for perioperative complications:   - No identified additional risk factors other than previously addressed    Medication Instructions:  Patient is to take all scheduled medications on the day of surgery    RECOMMENDATION:  APPROVAL GIVEN to proceed with proposed procedure, without further diagnostic evaluation.      32 minutes spent on the date of the encounter doing chart review, review of test results, interpretation of tests, patient visit and  documentation         Subjective     HPI related to upcoming procedure: abdominal pain      Preop Questions 7/28/2022   1. Have you ever had a heart attack or stroke? No   2. Have you ever had surgery on your heart or blood vessels, such as a stent placement, a coronary artery bypass, or surgery on an artery in your head, neck, heart, or legs? No   3. Do you have chest pain with activity? no   4. Do you have a history of  heart failure? No   5. Do you currently have a cold, bronchitis or symptoms of other infection? No   6. Do you have a cough, shortness of breath, or wheezing? No   7. Do you or anyone in your family have previous history of blood clots? No known history of blood clot   8. Do you or does anyone in your family have a serious bleeding problem such as prolonged bleeding following surgeries or cuts? No   9. Have you ever had problems with anemia or been told to take iron pills? No   10. Have you had any abnormal blood loss such as black, tarry or bloody stools, or abnormal vaginal bleeding? No   11. Have you ever had a blood transfusion? No   12. Are you willing to have a blood transfusion if it is medically needed before, during, or after your surgery? Yes   13. Have you or any of your relatives ever had problems with anesthesia? No   14. Do you have sleep apnea, excessive snoring or daytime drowsiness? Snores- not every night- has had sleep apnea test and was negative   14a. Do you have a CPAP machine? No   15. Do you have any artifical heart valves or other implanted medical devices like a pacemaker, defibrillator, or continuous glucose monitor? No   16. Do you have artificial joints? No   17. Are you allergic to latex? No   18. Is there any chance that you may be pregnant? No     Health Care Directive:  Patient does not have a Health Care Directive or Living Will: Discussed advance care planning with patient; however, patient declined at this time.    Preoperative Review of :   reviewed - no  record of controlled substances prescribed.      Status of Chronic Conditions:  See problem list for active medical problems.  Problems all longstanding and stable, except as noted/documented.  See ROS for pertinent symptoms related to these conditions.      Review of Systems  CONSTITUTIONAL: NEGATIVE for fever, chills, change in weight  INTEGUMENTARY/SKIN: NEGATIVE for worrisome rashes, moles or lesions  EYES: NEGATIVE for vision changes or irritation  ENT/MOUTH: NEGATIVE for ear, mouth and throat problems  RESP: NEGATIVE for significant cough or SOB  CV: NEGATIVE for chest pain, palpitations or peripheral edema  GI: NEGATIVE for nausea, abdominal pain, heartburn, or change in bowel habits  : NEGATIVE for frequency, dysuria, or hematuria  MUSCULOSKELETAL: NEGATIVE for significant arthralgias or myalgia  NEURO: NEGATIVE for weakness, dizziness or paresthesias  ENDOCRINE: NEGATIVE for temperature intolerance, skin/hair changes  HEME: NEGATIVE for bleeding problems  PSYCHIATRIC: NEGATIVE for changes in mood or affect    Patient Active Problem List    Diagnosis Date Noted     Tobacco use disorder 05/01/2018     Priority: Medium     Major depressive disorder, recurrent episode, moderate (H) 04/20/2018     Priority: Medium     RENAY (generalized anxiety disorder) 04/20/2018     Priority: Medium     Irregular periods 12/08/2015     Priority: Medium     S/P LEEP of cervix 01/23/2013     Priority: Medium     5/15/09 NIL pap  6/30/11 ASCUS pap, + HR HPV.   8/30/11 [place] bx: SUZAN 2, ECC SUZAN 1  3/27/12 [place] bx: SUZAN 1, ECC: neg.  Pap: NIL, + HR HPV  9/26/12 ASCUS pap, + HR HPV  11/7/12 [place] bx: SUZAN 2, ECC: SUZAN 1.   1/23/13 LEEP: SUZAN 2 with free margins.   6/25/13 NIL pap     ** above information is from [facility] Everywhere  **  12/8/15 NIL pap, neg HPV. Plan: cotest in 1 yr, per Staff message from Dr. Farnsworth dated 2/5/18.  4/13/18 NIL pap, neg HR HPV.Plan: cotest in 3 years.        Past Medical History:   Diagnosis Date     ASCUS with  "positive high risk HPV cervical 2012     Excessive or frequent menstruation 3/14/2017     H/O colposcopy with cervical biopsy 2012    SUZAN 2-3     S/P LEEP of cervix 2013    SUZAN 2 with free margins     Past Surgical History:   Procedure Laterality Date     LEEP TX, CERVICAL  2013    SUZAN 2 with free margins - Care Everywhere.     RELEASE CARPAL TUNNEL Right 10/5/2016    Procedure: RELEASE CARPAL TUNNEL;  Surgeon: Christian Sebastian MD;  Location: [facility]     Current Outpatient Medications   Medication Sig Dispense Refill     levothyroxine (SYNTHROID/LEVOTHROID) 112 MCG tablet Take 1 tablet (112 mcg) by mouth daily 90 tablet 0     ibuprofen (ADVIL/MOTRIN) 200 MG capsule Take 400 mg by mouth every 4 hours as needed for fever (Patient not taking: No sig reported)         Allergies   Allergen Reactions     Cyclobenzaprine      Started giving her dreams        Social History     Tobacco Use     Smoking status: Former Smoker     Packs/day: 0.25     Types: Cigarettes     Quit date: 2022     Years since quittin.0     Smokeless tobacco: Never Used   Substance Use Topics     Alcohol use: Yes     Alcohol/week: 0.0 standard drinks     Comment: occ.     Family History   Problem Relation Age of Onset     Hypertension Father      History   Drug Use No         Objective     /62   Pulse 82   Temp 98.1  F (36.7  C) (Temporal)   Ht 1.6 m (5' 3\")   Wt 84.8 kg (187 lb)   SpO2 98%   BMI 33.13 kg/m      Physical Exam    GENERAL APPEARANCE: healthy, alert and no distress     EYES: EOMI, PERRL     HENT: ear canals and TM's normal and nose and mouth without ulcers or lesions     NECK: no adenopathy, no asymmetry, masses, or scars and thyroid normal to palpation     RESP: lungs clear to auscultation - no rales, rhonchi or wheezes     CV: regular rates and rhythm, normal S1 S2, no S3 or S4 and no murmur, click or rub     ABDOMEN:  soft, nontender, no HSM or masses and bowel sounds normal     MS: " [No Acute Distress] : no acute distress extremities normal- no gross deformities noted, no evidence of inflammation in joints, FROM in all extremities.     SKIN: no suspicious lesions or rashes     NEURO: Normal strength and tone, sensory exam grossly normal, mentation intact and speech normal     PSYCH: mentation appears normal. and affect normal/bright     LYMPHATICS: No cervical adenopathy    Recent Labs   Lab Test 06/03/22  0810 04/05/21  1357 10/19/20  1459   HGB 14.9 11.9  --      --   --      --   --    POTASSIUM 3.8  --   --    CR 0.71  --   --    A1C  --   --  4.9        Diagnostics:  No results found for this or any previous visit (from the past 24 hour(s)).   No EKG required, no history of coronary heart disease, significant arrhythmia, peripheral arterial disease or other structural heart disease.    Revised Cardiac Risk Index (RCRI):  The patient has the following serious cardiovascular risks for perioperative complications:   - No serious cardiac risks = 0 points     RCRI Interpretation: 0 points: Class I (very low risk - 0.4% complication rate)           Signed Electronically by: Jannie Cox NP  Copy of this evaluation report is provided to requesting physician.         [Well Nourished] : well nourished [Well-Appearing] : well-appearing [Well Developed] : well developed [Normal Sclera/Conjunctiva] : normal sclera/conjunctiva [PERRL] : pupils equal round and reactive to light [EOMI] : extraocular movements intact [Normal Outer Ear/Nose] : the outer ears and nose were normal in appearance [Normal Oropharynx] : the oropharynx was normal [No JVD] : no jugular venous distention [Supple] : supple [No Lymphadenopathy] : no lymphadenopathy [Thyroid Normal, No Nodules] : the thyroid was normal and there were no nodules present [No Respiratory Distress] : no respiratory distress  [No Accessory Muscle Use] : no accessory muscle use [Clear to Auscultation] : lungs were clear to auscultation bilaterally [Normal Rate] : normal rate  [Regular Rhythm] : with a regular rhythm [Normal S1, S2] : normal S1 and S2 [No Murmur] : no murmur heard [No Carotid Bruits] : no carotid bruits [No Abdominal Bruit] : a ~M bruit was not heard ~T in the abdomen [No Varicosities] : no varicosities [No Edema] : there was no peripheral edema [Pedal Pulses Present] : the pedal pulses are present [No Palpable Aorta] : no palpable aorta [No Extremity Clubbing/Cyanosis] : no extremity clubbing/cyanosis [Soft] : abdomen soft [Non Tender] : non-tender [Non-distended] : non-distended [No Masses] : no abdominal mass palpated [No HSM] : no HSM [Normal Bowel Sounds] : normal bowel sounds [Normal Posterior Cervical Nodes] : no posterior cervical lymphadenopathy [Normal Anterior Cervical Nodes] : no anterior cervical lymphadenopathy [No Spinal Tenderness] : no spinal tenderness [No CVA Tenderness] : no CVA  tenderness [No Joint Swelling] : no joint swelling [Grossly Normal Strength/Tone] : grossly normal strength/tone [No Rash] : no rash [Coordination Grossly Intact] : coordination grossly intact [No Focal Deficits] : no focal deficits [Normal Gait] : normal gait [Deep Tendon Reflexes (DTR)] : deep tendon reflexes were 2+ and symmetric [Normal Insight/Judgement] : insight and judgment were intact [Normal Affect] : the affect was normal

## 2022-07-28 NOTE — PATIENT INSTRUCTIONS
Preparing for Your Surgery  Getting started  A nurse will call you to review your health history and instructions. They will give you an arrival time based on your scheduled surgery time. Please be ready to share:    Your doctor's clinic name and phone number    Your medical, surgical and anesthesia history    A list of allergies and sensitivities    A list of medicines, including herbal treatments and over-the-counter drugs    Whether the patient has a legal guardian (ask how to send us the papers in advance)  Please tell us if you're pregnant--or if there's any chance you might be pregnant. Some surgeries may injure a fetus (unborn baby), so they require a pregnancy test. Surgeries that are safe for a fetus don't always need a test, and you can choose whether to have one.   If you have a child who's having surgery, please ask for a copy of Preparing for Your Child's Surgery.    Preparing for surgery    Within 30 days of surgery: Have a pre-op exam (sometimes called an H&P, or History and Physical). This can be done at a clinic or pre-operative center.  ? If you're having a , you may not need this exam. Talk to your care team.    At your pre-op exam, talk to your care team about all medicines you take. If you need to stop any medicines before surgery, ask when to start taking them again.  ? We do this for your safety. Many medicines can make you bleed too much during surgery. Some change how well surgery (anesthesia) drugs work.    Call your insurance company to let them know you're having surgery. (If you don't have insurance, call [phone].)    Call your clinic if there's any change in your health. This includes signs of a cold or flu (sore throat, runny nose, cough, rash, fever). It also includes a scrape or scratch near the surgery site.    If you have questions on the day of surgery, call your hospital or surgery center.  COVID testing  You may need to be tested for COVID-19 before having  surgery. If so, we will give you instructions.  Eating and drinking guidelines  For your safety: Unless your surgeon tells you otherwise, follow the guidelines below.    Eat and drink as usual until 8 hours before surgery. After that, no food or milk.    Drink clear liquids until 2 hours before surgery. These are liquids you can see through, like water, Gatorade and Propel Water. You may also have black coffee and tea (no cream or milk).    Nothing by mouth within 2 hours of surgery. This includes gum, candy and breath mints.    If you drink alcohol: Stop drinking it the night before surgery.    If your care team tells you to take medicine on the morning of surgery, it's okay to take it with a sip of water.  Preventing infection    Shower or bathe the night before and morning of your surgery. Follow the instructions your clinic gave you. (If no instructions, use regular soap.)    Don't shave or clip hair near your surgery site. We'll remove the hair if needed.    Don't smoke or vape the morning of surgery. You may chew nicotine gum up to 2 hours before surgery. A nicotine patch is okay.  ? Note: Some surgeries require you to completely quit smoking and nicotine. Check with your surgeon.    Your care team will make every effort to keep you safe from infection. We will:  ? Clean our hands often with soap and water (or an alcohol-based hand rub).  ? Clean the skin at your surgery site with a special soap that kills germs.  ? Give you a special gown to keep you warm. (Cold raises the risk of infection.)  ? Wear special hair covers, masks, gowns and gloves during surgery.  ? Give antibiotic medicine, if prescribed. Not all surgeries need antibiotics.  What to bring on the day of surgery    Photo ID and insurance card    Copy of your health care directive, if you have one    Glasses and hearing aides (bring cases)  ? You can't wear contacts during surgery    Inhaler and eye drops, if you use them (tell us about these when  you arrive)    CPAP machine or breathing device, if you use them    A few personal items, if spending the night    If you have . . .  ? A pacemaker, ICD (cardiac defibrillator) or other implant: Bring the ID card.  ? An implanted stimulator: Bring the remote control.  ? A legal guardian: Bring a copy of the certified (court-stamped) guardianship papers.  Please remove any jewelry, including body piercings. Leave jewelry and other valuables at home.  If you're going home the day of surgery    You must have a responsible adult drive you home. They should stay with you overnight as well.    If you don't have someone to stay with you, and you aren't safe to go home alone, we may keep you overnight. Insurance often won't pay for this.  After surgery  If it's hard to control your pain or you need more pain medicine, please call your surgeon's office.  Questions?   If you have any questions for your care team, list them here: _________________________________________________________________________________________________________________________________________________________________________ ____________________________________ ____________________________________ ____________________________________  For informational purposes only. Not to replace the advice of your health care provider. Copyright   2003, 2019 [facility]. All rights reserved. Clinically reviewed by Vero Multani MD. [employer] [id number] - REV 07/21.

## 2022-07-28 NOTE — H&P (VIEW-ONLY)
[address] [address]  Phone: [phone]  Fax: [phone]  Primary Provider: Demetra Alfaro  Pre-op Performing Provider: DEMETRA ALFARO      PREOPERATIVE EVALUATION:  Today's date: 7/28/2022    Leah Alfaro is a 31 year old female who presents for a preoperative evaluation.    Surgical Information:  Surgery/Procedure: CHOLECYSTECTOMY, LAPAROSCOPIC, possible open  Surgery Location: [facility]  Surgeon: Dr. Madison  Surgery Date: 07/29/2022  Time of Surgery: 1:30  Where patient plans to recover: At home with family  Fax number for surgical facility: Note does not need to be faxed, will be available electronically in Epic.    Type of Anesthesia Anticipated: General    Assessment & Plan     The proposed surgical procedure is considered INTERMEDIATE risk.    Preop general physical exam  pre-op physical to evaluate for anesthesia and its lauren-operative risks.      Biliary dyskinesia  Followed by General Surgery- planning surgery    Major depressive disorder, recurrent episode, moderate (H)  PHQ-9 is 6.  Declines medications.  Discussed good self care, sleep hygiene, mindfulness therapies and meditation.    - AK BEHAV ASSMT W/SCORE & DOCD/STAND INSTRUMENT    Hypothyroidism, unspecified type  Check labs today.  stable  - TSH with free T4 reflex; Future  - TSH with free T4 reflex         Risks and Recommendations:  The patient has the following additional risks and recommendations for perioperative complications:   - No identified additional risk factors other than previously addressed    Medication Instructions:  Patient is to take all scheduled medications on the day of surgery    RECOMMENDATION:  APPROVAL GIVEN to proceed with proposed procedure, without further diagnostic evaluation.      32 minutes spent on the date of the encounter doing chart review, review of test results, interpretation of tests, patient visit and  documentation         Subjective     HPI related to upcoming procedure: abdominal pain      Preop Questions 7/28/2022   1. Have you ever had a heart attack or stroke? No   2. Have you ever had surgery on your heart or blood vessels, such as a stent placement, a coronary artery bypass, or surgery on an artery in your head, neck, heart, or legs? No   3. Do you have chest pain with activity? no   4. Do you have a history of  heart failure? No   5. Do you currently have a cold, bronchitis or symptoms of other infection? No   6. Do you have a cough, shortness of breath, or wheezing? No   7. Do you or anyone in your family have previous history of blood clots? No known history of blood clot   8. Do you or does anyone in your family have a serious bleeding problem such as prolonged bleeding following surgeries or cuts? No   9. Have you ever had problems with anemia or been told to take iron pills? No   10. Have you had any abnormal blood loss such as black, tarry or bloody stools, or abnormal vaginal bleeding? No   11. Have you ever had a blood transfusion? No   12. Are you willing to have a blood transfusion if it is medically needed before, during, or after your surgery? Yes   13. Have you or any of your relatives ever had problems with anesthesia? No   14. Do you have sleep apnea, excessive snoring or daytime drowsiness? Snores- not every night- has had sleep apnea test and was negative   14a. Do you have a CPAP machine? No   15. Do you have any artifical heart valves or other implanted medical devices like a pacemaker, defibrillator, or continuous glucose monitor? No   16. Do you have artificial joints? No   17. Are you allergic to latex? No   18. Is there any chance that you may be pregnant? No     Health Care Directive:  Patient does not have a Health Care Directive or Living Will: Discussed advance care planning with patient; however, patient declined at this time.    Preoperative Review of :   reviewed - no  record of controlled substances prescribed.      Status of Chronic Conditions:  See problem list for active medical problems.  Problems all longstanding and stable, except as noted/documented.  See ROS for pertinent symptoms related to these conditions.      Review of Systems  CONSTITUTIONAL: NEGATIVE for fever, chills, change in weight  INTEGUMENTARY/SKIN: NEGATIVE for worrisome rashes, moles or lesions  EYES: NEGATIVE for vision changes or irritation  ENT/MOUTH: NEGATIVE for ear, mouth and throat problems  RESP: NEGATIVE for significant cough or SOB  CV: NEGATIVE for chest pain, palpitations or peripheral edema  GI: NEGATIVE for nausea, abdominal pain, heartburn, or change in bowel habits  : NEGATIVE for frequency, dysuria, or hematuria  MUSCULOSKELETAL: NEGATIVE for significant arthralgias or myalgia  NEURO: NEGATIVE for weakness, dizziness or paresthesias  ENDOCRINE: NEGATIVE for temperature intolerance, skin/hair changes  HEME: NEGATIVE for bleeding problems  PSYCHIATRIC: NEGATIVE for changes in mood or affect    Patient Active Problem List    Diagnosis Date Noted     Tobacco use disorder 05/01/2018     Priority: Medium     Major depressive disorder, recurrent episode, moderate (H) 04/20/2018     Priority: Medium     RENAY (generalized anxiety disorder) 04/20/2018     Priority: Medium     Irregular periods 12/08/2015     Priority: Medium     S/P LEEP of cervix 01/23/2013     Priority: Medium     5/15/09 NIL pap  6/30/11 ASCUS pap, + HR HPV.   8/30/11 [facility] bx: SUZAN 2, ECC SUZAN 1  3/27/12 [facility] bx: SUZAN 1, ECC: neg.  Pap: NIL, + HR HPV  9/26/12 ASCUS pap, + HR HPV  11/7/12 [facility] bx: SUZAN 2, ECC: SUZAN 1.   1/23/13 LEEP: SUZAN 2 with free margins.   6/25/13 NIL pap     ** above information is from Care Everywhere  **  12/8/15 NIL pap, neg HPV. Plan: cotest in 1 yr, per Staff message from Dr. Farnsworth dated 2/5/18.  4/13/18 NIL pap, neg HR HPV.Plan: cotest in 3 years.        Past Medical History:   Diagnosis Date     ASCUS with  "positive high risk HPV cervical 2012     Excessive or frequent menstruation 3/14/2017     H/O colposcopy with cervical biopsy 2012    SUZAN 2-3     S/P LEEP of cervix 2013    SUZAN 2 with free margins     Past Surgical History:   Procedure Laterality Date     LEEP TX, CERVICAL  2013    SUZAN 2 with free margins - Care Everywhere.     RELEASE CARPAL TUNNEL Right 10/5/2016    Procedure: RELEASE CARPAL TUNNEL;  Surgeon: Christian Sebastian MD;  Location: [facility] OR     Current Outpatient Medications   Medication Sig Dispense Refill     levothyroxine (SYNTHROID/LEVOTHROID) 112 MCG tablet Take 1 tablet (112 mcg) by mouth daily 90 tablet 0     ibuprofen (ADVIL/MOTRIN) 200 MG capsule Take 400 mg by mouth every 4 hours as needed for fever (Patient not taking: No sig reported)         Allergies   Allergen Reactions     Cyclobenzaprine      Started giving her dreams        Social History     Tobacco Use     Smoking status: Former Smoker     Packs/day: 0.25     Types: Cigarettes     Quit date: 2022     Years since quittin.0     Smokeless tobacco: Never Used   Substance Use Topics     Alcohol use: Yes     Alcohol/week: 0.0 standard drinks     Comment: occ.     Family History   Problem Relation Age of Onset     Hypertension Father      History   Drug Use No         Objective     /62   Pulse 82   Temp 98.1  F (36.7  C) (Temporal)   Ht 1.6 m (5' 3\")   Wt 84.8 kg (187 lb)   SpO2 98%   BMI 33.13 kg/m      Physical Exam    GENERAL APPEARANCE: healthy, alert and no distress     EYES: EOMI, PERRL     HENT: ear canals and TM's normal and nose and mouth without ulcers or lesions     NECK: no adenopathy, no asymmetry, masses, or scars and thyroid normal to palpation     RESP: lungs clear to auscultation - no rales, rhonchi or wheezes     CV: regular rates and rhythm, normal S1 S2, no S3 or S4 and no murmur, click or rub     ABDOMEN:  soft, nontender, no HSM or masses and bowel sounds normal     MS: " extremities normal- no gross deformities noted, no evidence of inflammation in joints, FROM in all extremities.     SKIN: no suspicious lesions or rashes     NEURO: Normal strength and tone, sensory exam grossly normal, mentation intact and speech normal     PSYCH: mentation appears normal. and affect normal/bright     LYMPHATICS: No cervical adenopathy    Recent Labs   Lab Test 06/03/22  0810 04/05/21  1357 10/19/20  1459   HGB 14.9 11.9  --      --   --      --   --    POTASSIUM 3.8  --   --    CR 0.71  --   --    A1C  --   --  4.9        Diagnostics:  No results found for this or any previous visit (from the past 24 hour(s)).   No EKG required, no history of coronary heart disease, significant arrhythmia, peripheral arterial disease or other structural heart disease.    Revised Cardiac Risk Index (RCRI):  The patient has the following serious cardiovascular risks for perioperative complications:   - No serious cardiac risks = 0 points     RCRI Interpretation: 0 points: Class I (very low risk - 0.4% complication rate)           Signed Electronically by: Jannie Cox NP  Copy of this evaluation report is provided to requesting physician.

## 2022-07-29 ENCOUNTER — HOSPITAL ENCOUNTER (OUTPATIENT)
Facility: CLINIC | Age: 32
Discharge: HOME | End: 2022-07-29
Attending: SURGERY | Admitting: SURGERY
Payer: COMMERCIAL

## 2022-07-29 ENCOUNTER — ANESTHESIA (OUTPATIENT)
Dept: SURGERY | Facility: CLINIC | Age: 32
End: 2022-07-29
Payer: COMMERCIAL

## 2022-07-29 ENCOUNTER — ANESTHESIA EVENT (OUTPATIENT)
Dept: SURGERY | Facility: CLINIC | Age: 32
End: 2022-07-29
Payer: COMMERCIAL

## 2022-07-29 VITALS
SYSTOLIC BLOOD PRESSURE: 108 MMHG | OXYGEN SATURATION: 96 % | HEART RATE: 76 BPM | DIASTOLIC BLOOD PRESSURE: 66 MMHG | RESPIRATION RATE: 16 BRPM | TEMPERATURE: 98.3 F

## 2022-07-29 DIAGNOSIS — Z90.49 S/P LAPAROSCOPIC CHOLECYSTECTOMY: Primary | ICD-10-CM

## 2022-07-29 LAB — HCG UR QL: NEGATIVE

## 2022-07-29 PROCEDURE — 250N000026 HC DESFLURANE, PER MIN: Performed by: SURGERY

## 2022-07-29 PROCEDURE — 250N000011 HC RX IP 250 OP 636: Performed by: NURSE ANESTHETIST, CERTIFIED REGISTERED

## 2022-07-29 PROCEDURE — 370N000017 HC ANESTHESIA TECHNICAL FEE, PER MIN: Performed by: SURGERY

## 2022-07-29 PROCEDURE — 258N000003 HC RX IP 258 OP 636: Performed by: NURSE ANESTHETIST, CERTIFIED REGISTERED

## 2022-07-29 PROCEDURE — 999N000141 HC STATISTIC PRE-PROCEDURE NURSING ASSESSMENT: Performed by: SURGERY

## 2022-07-29 PROCEDURE — 360N000076 HC SURGERY LEVEL 3, PER MIN: Performed by: SURGERY

## 2022-07-29 PROCEDURE — 710N000012 HC RECOVERY PHASE 2, PER MINUTE: Performed by: SURGERY

## 2022-07-29 PROCEDURE — 250N000009 HC RX 250: Performed by: SURGERY

## 2022-07-29 PROCEDURE — 47562 LAPAROSCOPIC CHOLECYSTECTOMY: CPT | Performed by: SURGERY

## 2022-07-29 PROCEDURE — 710N000010 HC RECOVERY PHASE 1, LEVEL 2, PER MIN: Performed by: SURGERY

## 2022-07-29 PROCEDURE — 250N000009 HC RX 250: Performed by: NURSE ANESTHETIST, CERTIFIED REGISTERED

## 2022-07-29 PROCEDURE — 250N000013 HC RX MED GY IP 250 OP 250 PS 637: Performed by: NURSE ANESTHETIST, CERTIFIED REGISTERED

## 2022-07-29 PROCEDURE — 88304 TISSUE EXAM BY PATHOLOGIST: CPT | Mod: 26 | Performed by: PATHOLOGY

## 2022-07-29 PROCEDURE — 250N000013 HC RX MED GY IP 250 OP 250 PS 637: Performed by: SURGERY

## 2022-07-29 PROCEDURE — 81025 URINE PREGNANCY TEST: CPT | Performed by: NURSE ANESTHETIST, CERTIFIED REGISTERED

## 2022-07-29 PROCEDURE — 272N000001 HC OR GENERAL SUPPLY STERILE: Performed by: SURGERY

## 2022-07-29 PROCEDURE — 250N000011 HC RX IP 250 OP 636: Performed by: SURGERY

## 2022-07-29 PROCEDURE — 88304 TISSUE EXAM BY PATHOLOGIST: CPT | Mod: TC | Performed by: SURGERY

## 2022-07-29 RX ORDER — OXYCODONE AND ACETAMINOPHEN 5; 325 MG/1; MG/1
1-2 TABLET ORAL EVERY 4 HOURS PRN
Qty: 12 TABLET | Refills: 0 | Status: SHIPPED | OUTPATIENT
Start: 2022-07-29 | End: 2023-08-03

## 2022-07-29 RX ORDER — HALOPERIDOL 5 MG/ML
1 INJECTION INTRAMUSCULAR
Status: DISCONTINUED | OUTPATIENT
Start: 2022-07-29 | End: 2022-07-29 | Stop reason: HOSPADM

## 2022-07-29 RX ORDER — FENTANYL CITRATE 50 UG/ML
50 INJECTION, SOLUTION INTRAMUSCULAR; INTRAVENOUS EVERY 5 MIN PRN
Status: DISCONTINUED | OUTPATIENT
Start: 2022-07-29 | End: 2022-07-29 | Stop reason: HOSPADM

## 2022-07-29 RX ORDER — OXYCODONE HYDROCHLORIDE 5 MG/1
5 TABLET ORAL EVERY 4 HOURS PRN
Status: DISCONTINUED | OUTPATIENT
Start: 2022-07-29 | End: 2022-07-29 | Stop reason: HOSPADM

## 2022-07-29 RX ORDER — FENTANYL CITRATE 50 UG/ML
50 INJECTION, SOLUTION INTRAMUSCULAR; INTRAVENOUS
Status: DISCONTINUED | OUTPATIENT
Start: 2022-07-29 | End: 2022-07-29 | Stop reason: HOSPADM

## 2022-07-29 RX ORDER — BUPIVACAINE HYDROCHLORIDE AND EPINEPHRINE 2.5; 5 MG/ML; UG/ML
INJECTION, SOLUTION INFILTRATION; PERINEURAL PRN
Status: DISCONTINUED | OUTPATIENT
Start: 2022-07-29 | End: 2022-07-29 | Stop reason: HOSPADM

## 2022-07-29 RX ORDER — KETOROLAC TROMETHAMINE 30 MG/ML
INJECTION, SOLUTION INTRAMUSCULAR; INTRAVENOUS PRN
Status: DISCONTINUED | OUTPATIENT
Start: 2022-07-29 | End: 2022-07-29

## 2022-07-29 RX ORDER — PROPOFOL 10 MG/ML
INJECTION, EMULSION INTRAVENOUS CONTINUOUS PRN
Status: DISCONTINUED | OUTPATIENT
Start: 2022-07-29 | End: 2022-07-29

## 2022-07-29 RX ORDER — DEXAMETHASONE SODIUM PHOSPHATE 10 MG/ML
INJECTION, SOLUTION INTRAMUSCULAR; INTRAVENOUS PRN
Status: DISCONTINUED | OUTPATIENT
Start: 2022-07-29 | End: 2022-07-29

## 2022-07-29 RX ORDER — HYDROMORPHONE HYDROCHLORIDE 1 MG/ML
0.5 INJECTION, SOLUTION INTRAMUSCULAR; INTRAVENOUS; SUBCUTANEOUS EVERY 5 MIN PRN
Status: DISCONTINUED | OUTPATIENT
Start: 2022-07-29 | End: 2022-07-29 | Stop reason: HOSPADM

## 2022-07-29 RX ORDER — DIMENHYDRINATE 50 MG/ML
25 INJECTION, SOLUTION INTRAMUSCULAR; INTRAVENOUS
Status: DISCONTINUED | OUTPATIENT
Start: 2022-07-29 | End: 2022-07-29 | Stop reason: HOSPADM

## 2022-07-29 RX ORDER — CEFAZOLIN SODIUM/WATER 2 G/20 ML
2 SYRINGE (ML) INTRAVENOUS SEE ADMIN INSTRUCTIONS
Status: DISCONTINUED | OUTPATIENT
Start: 2022-07-29 | End: 2022-07-29 | Stop reason: HOSPADM

## 2022-07-29 RX ORDER — SODIUM CHLORIDE, SODIUM LACTATE, POTASSIUM CHLORIDE, CALCIUM CHLORIDE 600; 310; 30; 20 MG/100ML; MG/100ML; MG/100ML; MG/100ML
INJECTION, SOLUTION INTRAVENOUS CONTINUOUS
Status: DISCONTINUED | OUTPATIENT
Start: 2022-07-29 | End: 2022-07-29 | Stop reason: HOSPADM

## 2022-07-29 RX ORDER — ONDANSETRON 2 MG/ML
4 INJECTION INTRAMUSCULAR; INTRAVENOUS EVERY 30 MIN PRN
Status: DISCONTINUED | OUTPATIENT
Start: 2022-07-29 | End: 2022-07-29 | Stop reason: HOSPADM

## 2022-07-29 RX ORDER — ONDANSETRON 4 MG/1
4 TABLET, ORALLY DISINTEGRATING ORAL EVERY 30 MIN PRN
Status: DISCONTINUED | OUTPATIENT
Start: 2022-07-29 | End: 2022-07-29 | Stop reason: HOSPADM

## 2022-07-29 RX ORDER — CEFAZOLIN SODIUM/WATER 2 G/20 ML
2 SYRINGE (ML) INTRAVENOUS
Status: COMPLETED | OUTPATIENT
Start: 2022-07-29 | End: 2022-07-29

## 2022-07-29 RX ORDER — LIDOCAINE 40 MG/G
CREAM TOPICAL
Status: DISCONTINUED | OUTPATIENT
Start: 2022-07-29 | End: 2022-07-29 | Stop reason: HOSPADM

## 2022-07-29 RX ORDER — FENTANYL CITRATE 50 UG/ML
INJECTION, SOLUTION INTRAMUSCULAR; INTRAVENOUS PRN
Status: DISCONTINUED | OUTPATIENT
Start: 2022-07-29 | End: 2022-07-29

## 2022-07-29 RX ORDER — LIDOCAINE HYDROCHLORIDE 20 MG/ML
INJECTION, SOLUTION INFILTRATION; PERINEURAL PRN
Status: DISCONTINUED | OUTPATIENT
Start: 2022-07-29 | End: 2022-07-29

## 2022-07-29 RX ORDER — ACETAMINOPHEN 500 MG
500-1000 TABLET ORAL EVERY 6 HOURS PRN
COMMUNITY

## 2022-07-29 RX ORDER — MEPERIDINE HYDROCHLORIDE 25 MG/ML
12.5 INJECTION INTRAMUSCULAR; INTRAVENOUS; SUBCUTANEOUS
Status: DISCONTINUED | OUTPATIENT
Start: 2022-07-29 | End: 2022-07-29 | Stop reason: HOSPADM

## 2022-07-29 RX ORDER — SCOLOPAMINE TRANSDERMAL SYSTEM 1 MG/1
1 PATCH, EXTENDED RELEASE TRANSDERMAL
Status: DISCONTINUED | OUTPATIENT
Start: 2022-07-29 | End: 2022-07-29 | Stop reason: HOSPADM

## 2022-07-29 RX ORDER — PROPOFOL 10 MG/ML
INJECTION, EMULSION INTRAVENOUS PRN
Status: DISCONTINUED | OUTPATIENT
Start: 2022-07-29 | End: 2022-07-29

## 2022-07-29 RX ORDER — ONDANSETRON 2 MG/ML
INJECTION INTRAMUSCULAR; INTRAVENOUS PRN
Status: DISCONTINUED | OUTPATIENT
Start: 2022-07-29 | End: 2022-07-29

## 2022-07-29 RX ORDER — OXYCODONE AND ACETAMINOPHEN 5; 325 MG/1; MG/1
2 TABLET ORAL
Status: COMPLETED | OUTPATIENT
Start: 2022-07-29 | End: 2022-07-29

## 2022-07-29 RX ADMIN — FENTANYL CITRATE 50 MCG: 50 INJECTION, SOLUTION INTRAMUSCULAR; INTRAVENOUS at 15:30

## 2022-07-29 RX ADMIN — Medication 2 G: at 15:05

## 2022-07-29 RX ADMIN — FENTANYL CITRATE 50 MCG: 50 INJECTION, SOLUTION INTRAMUSCULAR; INTRAVENOUS at 15:57

## 2022-07-29 RX ADMIN — ROCURONIUM BROMIDE 50 MG: 50 INJECTION, SOLUTION INTRAVENOUS at 15:30

## 2022-07-29 RX ADMIN — OXYCODONE HYDROCHLORIDE 5 MG: 5 TABLET ORAL at 18:29

## 2022-07-29 RX ADMIN — DEXAMETHASONE SODIUM PHOSPHATE 10 MG: 10 INJECTION, SOLUTION INTRAMUSCULAR; INTRAVENOUS at 15:44

## 2022-07-29 RX ADMIN — SCOPALAMINE 1 PATCH: 1 PATCH, EXTENDED RELEASE TRANSDERMAL at 14:52

## 2022-07-29 RX ADMIN — SODIUM CHLORIDE, POTASSIUM CHLORIDE, SODIUM LACTATE AND CALCIUM CHLORIDE: 600; 310; 30; 20 INJECTION, SOLUTION INTRAVENOUS at 16:14

## 2022-07-29 RX ADMIN — MIDAZOLAM 2 MG: 1 INJECTION INTRAMUSCULAR; INTRAVENOUS at 15:24

## 2022-07-29 RX ADMIN — LIDOCAINE HYDROCHLORIDE 100 MG: 20 INJECTION, SOLUTION INFILTRATION; PERINEURAL at 15:30

## 2022-07-29 RX ADMIN — SUGAMMADEX 200 MG: 100 INJECTION, SOLUTION INTRAVENOUS at 16:13

## 2022-07-29 RX ADMIN — PROPOFOL 100 MCG/KG/MIN: 10 INJECTION, EMULSION INTRAVENOUS at 15:30

## 2022-07-29 RX ADMIN — HYDROMORPHONE HYDROCHLORIDE 0.5 MG: 1 INJECTION, SOLUTION INTRAMUSCULAR; INTRAVENOUS; SUBCUTANEOUS at 17:24

## 2022-07-29 RX ADMIN — FENTANYL CITRATE 50 MCG: 50 INJECTION, SOLUTION INTRAMUSCULAR; INTRAVENOUS at 16:35

## 2022-07-29 RX ADMIN — PROPOFOL 200 MG: 10 INJECTION, EMULSION INTRAVENOUS at 15:30

## 2022-07-29 RX ADMIN — OXYCODONE HYDROCHLORIDE AND ACETAMINOPHEN 1 TABLET: 5; 325 TABLET ORAL at 16:57

## 2022-07-29 RX ADMIN — SODIUM CHLORIDE, POTASSIUM CHLORIDE, SODIUM LACTATE AND CALCIUM CHLORIDE: 600; 310; 30; 20 INJECTION, SOLUTION INTRAVENOUS at 15:24

## 2022-07-29 RX ADMIN — ONDANSETRON 4 MG: 2 INJECTION INTRAMUSCULAR; INTRAVENOUS at 15:50

## 2022-07-29 RX ADMIN — FENTANYL CITRATE 50 MCG: 50 INJECTION, SOLUTION INTRAMUSCULAR; INTRAVENOUS at 16:41

## 2022-07-29 RX ADMIN — KETOROLAC TROMETHAMINE 30 MG: 30 INJECTION, SOLUTION INTRAMUSCULAR at 16:11

## 2022-07-29 ASSESSMENT — LIFESTYLE VARIABLES: TOBACCO_USE: 1

## 2022-07-29 NOTE — ANESTHESIA PROCEDURE NOTES
Airway       Patient location during procedure: OR       Procedure Start/Stop Times: 7/29/2022 3:33 PM  Staff -        CRNA: Elizabet Gold APRN CRNA       Performed By: CRNA  Consent for Airway        Urgency: elective  Indications and Patient Condition       Indications for airway management: lauren-procedural       Induction type:intravenous       Mask difficulty assessment: 2 - vent by mask + OA or adjuvant +/- NMBA    Final Airway Details       Final airway type: endotracheal airway       Successful airway: ETT - single  Endotracheal Airway Details        ETT size (mm): 7.0       Cuffed: yes       Cuff volume (mL): 8       Successful intubation technique: direct laryngoscopy       Grade View of Cords: 1       Adjucts: stylet       Position: Right       Measured from: lips       Secured at (cm): 22       Bite block used: None    Post intubation assessment        Placement verified by: capnometry, equal breath sounds and chest rise        Number of attempts at approach: 1       Number of other approaches attempted: 0       Secured with: silk tape       Ease of procedure: easy       Dentition: Intact and Unchanged    Medication(s) Administered   Medication Administration Time: 7/29/2022 3:33 PM    Additional Comments       Atraumatic. No apparent complications.

## 2022-07-29 NOTE — OP NOTE
Procedure Date: 7/29/2022     PROCEDURE:  Laparoscopic cholecystectomy     PREOPERATIVE DIAGNOSIS:   Biliary dyskinesia     POSTOPERATIVE DIAGNOSIS:   Biliary dyskinesia     SURGEON:  Edwardo Madison DO     ASSISTANT:  None.     COMPLICATIONS:  None immediately apparent.     SPECIMENS:  Gallbladder and contents     ESTIMATED BLOOD LOSS:  5 mL     INDICATIONS FOR PROCEDURE:   Virginia is a 31-year-old female with complaints of postprandial upper abdominal pain.  She had a HIDA scan which showed decreased ejection fraction with reproduction of her symptoms. I recommended laparoscopic cholecystectomy, possible open, for biliary dyskinesia. We discussed the procedure in detail as well as the risks, benefits, alternatives, postoperative care and restrictions.  After our informed discussion, we agreed to proceed with surgery.     DESCRIPTION OF PROCEDURE:  After the informed consent was obtained, the patient was brought from the preoperative holding area to the operating room and placed in supine position.  Anesthesia was induced.  They were prepped and draped in normal sterile fashion.  Timeout was performed.  After correct patient and correct procedure were verified, we began by making a supraumbilical 5 mm incision.  Veress needle was inserted into the peritoneal cavity and the abdomen was insufflated to 15 mmHg.  A 5 mm trocar was inserted.  Camera was inserted.  General survey of the abdomen revealed no gross abnormalities.  The patient was placed in the head up rotated left position.  Two additional 5 mm trocars and an 11 mm trocar were placed in the right subcostal margin and subxiphoid area respectively.  Gallbladder was retracted and elevated superiorly and laterally.  The peritoneum off of Ernesto's pouch was taken down.  I was able to use a curved [place] dissector to circumferentially dissect around the proximal portion of the cystic duct.  This dissection was brought posteriorly until I encountered the  cystic artery. This was also circumferentially dissected. Posterior to this, the cystic plate of the liver was encountered. Two structures were clearly going into the gallbladder, namely the cystic duct and cystic artery. We had therefore achieved the critical view of safety.  These structures were clipped on the stay side x 2 and the specimen side x 1 and transected with EndoShears.  The gallbladder was then removed from the liver bed using hook electrocautery without issue.  This was placed in an EndoCatch bag and brought through the 11 mm incision.  Survey of the operative field revealed no apparent complications.  No blood or bile staining.  The patient's 11 mm fascial defect was then closed using a PMI closure device and an 0 Vicryl suture in an interrupted fashion.  The patient's abdomen was then desufflated.  All ports were removed under direct visualization.  The skin was instilled with 0.25% Marcaine with epinephrine for local anesthesia.  Skin was closed with inverted interrupted 4-0 Monocryl suture and Exofin dressing was applied.  At the completion of the case, all instruments, needles and sponge counts were accounted for, after a correct count.  The patient was then brought to the recovery room in stable condition.        Edwardo Madison DO

## 2022-07-29 NOTE — ANESTHESIA PREPROCEDURE EVALUATION
Anesthesia Pre-Procedure Evaluation    Patient: Leah Cox   MRN: [id number] : 1990        Procedure : Procedure(s):  CHOLECYSTECTOMY, LAPAROSCOPIC, possible open          Past Medical History:   Diagnosis Date     ASCUS with positive high risk HPV cervical 2012     Excessive or frequent menstruation 3/14/2017     H/O colposcopy with cervical biopsy 2012    SUZAN 2-3     S/P LEEP of cervix 2013    SUZAN 2 with free margins      Past Surgical History:   Procedure Laterality Date     LEEP TX, CERVICAL  2013    SUZAN 2 with free margins - [facility] Everywhere.     RELEASE CARPAL TUNNEL Right 10/5/2016    Procedure: RELEASE CARPAL TUNNEL;  Surgeon: Christian Sebastian MD;  Location: [facility] OR      Allergies   Allergen Reactions     Cyclobenzaprine      Started giving her dreams      Social History     Tobacco Use     Smoking status: Former Smoker     Packs/day: 0.25     Types: Cigarettes     Quit date: 2022     Years since quittin.0     Smokeless tobacco: Never Used   Substance Use Topics     Alcohol use: Yes     Alcohol/week: 0.0 standard drinks     Comment: occ.      Wt Readings from Last 1 Encounters:   22 84.8 kg (187 lb)        Anesthesia Evaluation   Pt has had prior anesthetic. Type: General and MAC.    No history of anesthetic complications       ROS/MED HX  ENT/Pulmonary:     (+) JOURDAN risk factors, snores loudly, tobacco use, Past use,     Neurologic:  - neg neurologic ROS     Cardiovascular:  - neg cardiovascular ROS     METS/Exercise Tolerance: >4 METS    Hematologic:  - neg hematologic  ROS     Musculoskeletal:  - neg musculoskeletal ROS     GI/Hepatic:  - neg GI/hepatic ROS     Renal/Genitourinary:  - neg Renal ROS     Endo:     (+) thyroid problem, hypothyroidism,     Psychiatric/Substance Use:     (+) psychiatric history depression and anxiety     Infectious Disease:  - neg infectious disease ROS     Malignancy:  - neg malignancy ROS     Other:  - neg other ROS           Physical Exam    Airway        Mallampati: II   TM distance: > 3 FB   Neck ROM: full   Mouth opening: > 3 cm    Respiratory Devices and Support         Dental  no notable dental history         Cardiovascular   cardiovascular exam normal       Rhythm and rate: regular and normal     Pulmonary   pulmonary exam normal        breath sounds clear to auscultation           OUTSIDE LABS:  CBC:   Lab Results   Component Value Date    WBC 9.4 06/03/2022    WBC 12.4 (H) 11/07/2018    HGB 14.9 06/03/2022    HGB 11.9 04/05/2021    HCT 42.8 06/03/2022    HCT 41.9 11/07/2018     06/03/2022     11/07/2018     BMP:   Lab Results   Component Value Date     06/03/2022     11/07/2018    POTASSIUM 3.8 06/03/2022    POTASSIUM 3.7 11/07/2018    CHLORIDE 110 (H) 06/03/2022    CHLORIDE 103 11/07/2018    CO2 28 06/03/2022    CO2 29 11/07/2018    BUN 10 06/03/2022    BUN 13 11/07/2018    CR 0.71 06/03/2022    CR 0.77 11/07/2018    GLC 96 06/03/2022    GLC 78 11/07/2018     COAGS:   Lab Results   Component Value Date    INR 0.89 11/01/2017     POC:   Lab Results   Component Value Date    HCG Negative 03/21/2019    HCGS Negative 04/18/2019     HEPATIC:   Lab Results   Component Value Date    ALBUMIN 3.0 (L) 06/03/2022    PROTTOTAL 5.6 (L) 06/03/2022    ALT 36 06/03/2022    AST 20 06/03/2022    ALKPHOS 46 06/03/2022    BILITOTAL 0.3 06/03/2022     OTHER:   Lab Results   Component Value Date    A1C 4.9 10/19/2020    VOLODYMYR 8.3 (L) 06/03/2022    LIPASE 78 06/03/2022    AMYLASE 31 08/02/2018    TSH 3.03 07/28/2022    T4 1.18 05/04/2022    CRP <2.9 06/03/2022       Anesthesia Plan    ASA Status:  2   NPO Status:  NPO Appropriate    Anesthesia Type: General.     - Airway: ETT   Induction: Intravenous, Propofol.   Maintenance: Balanced.        Consents    Anesthesia Plan(s) and associated risks, benefits, and realistic alternatives discussed. Questions answered and patient/representative(s) expressed understanding.     -  Discussed: Risks, Benefits and Alternatives for BOTH SEDATION and the PROCEDURE were discussed     - Discussed with:  Patient      - Extended Intubation/Ventilatory Support Discussed: No.      - Patient is DNR/DNI Status: No    Use of blood products discussed: Yes.     - Discussed with: Patient.     - Consented: consented to blood products            Reason for refusal: other.     Postoperative Care    Pain management: IV analgesics, Oral pain medications, Multi-modal analgesia.   PONV prophylaxis: Ondansetron (or other 5HT-3), Dexamethasone or Solumedrol, Background Propofol Infusion     Comments:    Other Comments: The risks and benefits of anesthesia, and the alternatives where applicable, have been discussed with the patient, and they wish to proceed.            NADJA Bill CRNA

## 2022-07-29 NOTE — OR NURSING
Verbal report received from Cynthia GLASER RN and Arianna MARTINEZ. Phase 1 recovery assumed by Fay ZUNIGA. Pt post-op general anesthesia for lap beto per .

## 2022-07-29 NOTE — DISCHARGE INSTRUCTIONS
[facility]    Home Care Following Gallbladder Surgery    Dr. Madison  You may take Ibuprofen at 10 PM    Care of the Incision:  Remove gauze dressing (if present) after 24 hours and then ok to shower.   Do not apply ointment to your incisions.  Surgical glue was used on your incisions so keep it dry for 24 hours.  Then you may shower but don t submerge under water for at least 2 weeks.  Gently pat your incisions dry with a freshly laundered towel.  Do not touch your incisions with bare hands or pick at scabs.  Leave your incisions open to air.  Cover incisions only if draining, clothing rubs or irritates them.    Activity:  Gradually increase your activity.  Walk short distances several times each day and increase the distance as your strength allows.  To promote circulation, do not cross your legs while sitting.  Return to work will be determined by the type of work you do and should be discussed with your physician.  Do not drive or operate equipment while taking prescription pain medicines.  You may drive 1 week after surgery if you have stopped taking prescription pain medicines and can react quickly enough to make an emergency stop if necessary.    Diet:  Return to the diet you were on before surgery.  Drink plenty of water.  Avoid foods that cause constipation.    REMEMBER--most prescription pain pills cause constipation.  Walking, extra fluids, and increased fiber (fresh fruits and vegetables, etc.) are natural remedies for constipation.  You can also take mineral oil, 1-2 Tablespoons per day.  If still constipated you may try a stool softener such as Colace or Miralax.    Call Your Physician if You Have:  Redness, increased swelling or cloudy drainage from your incision.  A temperature of more than 101 degrees F.  Worsening pain in your incision not relieved by your prescription pain pills and/or a short rest.  Jaundice (yellowing of skin or eyes)  Abdominal distention (stomach getting  very large)  Swelling in your legs  Productive cough  Burning with urination  Any questions or concerns about your recovery, please call Specialty clinic Business hours [phone]    After hours [phone] [facility] Nurse Advice Line (24 hours a day)    Follow-up Care:  Make an appointment 2-3 week after your surgery if not already made.  Call [phone].   [facility] Same-Day Surgery   Adult Discharge Orders & Instructions     For 24 hours after surgery    Get plenty of rest.  A responsible adult must stay with you for at least 24 hours after you leave the hospital.   Do not drive or use heavy equipment.  If you have weakness or tingling, don't drive or use heavy equipment until this feeling goes away.  Do not drink alcohol.  Avoid strenuous or risky activities.  Ask for help when climbing stairs.   You may feel lightheaded.  If so, sit for a few minutes before standing.  Have someone help you get up.   You may have a slight fever. Call the doctor if your fever is over 100 F (37.7 C) (taken under the tongue) or lasts longer than 24 hours.  You may have a dry mouth, a sore throat, muscle aches or trouble sleeping.  These should go away after 24 hours.  Do not make important or legal decisions.  We don t expect you to have any problems from the surgery or treatment you had today. Just in case, here s what to do if you have pain, upset stomach (nausea), bleeding or infection:  Pain:  Take medicines your doctor has prescribed or over-the-counter medicine they have suggested. Resting and using ice packs can help, too. For surgery on an arm or leg, raise it on a pillow to ease swelling. Call your doctor if these methods don t work.  Copyright [employer], Licensed under [employer]  Upset stomach (nausea):  Take anti-nausea medicine approved by your doctor. Drink clear liquids like apple juice, ginger ale, broth or 7-Up. Be sure to drink enough fluids. Rest can help, too. Move to  normal foods when you re ready. Bleeding:  In the first 24 hours, you may see a little blood on your dressing, about the size of a quarter. You don t need to worry about this much blood, but if the blood spot keeps getting bigger:  Put pressure on the wound if you can, AND  Call your doctor.  Copyright [employer], Licensed under [employer]  Fever/Infection: Please call your doctor if you have any of these signs:  Redness  Swelling  Wound feels warm  Pain gets worse  Bad-smelling fluid leaks from wound  Fever or chills  Call your doctor for any of the followin.  It has been over 8 to 10 hours since surgery and you are still not able to urinate (pass water).    2.  Headache for over 24 hours.         [facility] Nurse advice line: [phone] (24 hr line)

## 2022-07-29 NOTE — ANESTHESIA CARE TRANSFER NOTE
Patient: Leah Cox    Procedure: Procedure(s):  CHOLECYSTECTOMY, LAPAROSCOPIC, possible open       Diagnosis: Biliary dyskinesia [K82.8]  Diagnosis Additional Information: No value filed.    Anesthesia Type:   General     Note:    Oropharynx: oropharynx clear of all foreign objects and spontaneously breathing  Level of Consciousness: drowsy  Oxygen Supplementation: face mask    Independent Airway: airway patency satisfactory and stable  Dentition: dentition unchanged  Vital Signs Stable: post-procedure vital signs reviewed and stable  Report to RN Given: handoff report given  Patient transferred to: PACU    Handoff Report: Identifed the Patient, Identified the Reponsible Provider, Reviewed the pertinent medical history, Discussed the surgical course, Reviewed Intra-OP anesthesia mangement and issues during anesthesia, Set expectations for post-procedure period and Allowed opportunity for questions and acknowledgement of understanding      Vitals:  Vitals Value Taken Time   BP     Temp     Pulse     Resp     SpO2         Electronically Signed By: NADJA Núñez CRNA  July 29, 2022  4:29 PM

## 2022-07-29 NOTE — ANESTHESIA POSTPROCEDURE EVALUATION
Patient: Leah Cox    Procedure: Procedure(s):  CHOLECYSTECTOMY, LAPAROSCOPIC, possible open       Anesthesia Type:  General    Note:  Disposition: Outpatient   Postop Pain Control: Uneventful            Sign Out: Well controlled pain   PONV: No   Neuro/Psych: Uneventful            Sign Out: Acceptable/Baseline neuro status   Airway/Respiratory: Uneventful            Sign Out: Acceptable/Baseline resp. status   CV/Hemodynamics: Uneventful            Sign Out: Acceptable CV status   Other NRE: NONE   DID A NON-ROUTINE EVENT OCCUR? No    Event details/Postop Comments:  Pt was happy with anesthesia care.  No complications.  I will follow up with the pt if needed.           Last vitals:  Vitals Value Taken Time   /56 07/29/22 1731   Temp 98.1  F (36.7  C) 07/29/22 1731   Pulse 73 07/29/22 1740   Resp 15 07/29/22 1740   SpO2 98 % 07/29/22 1740   Vitals shown include unvalidated device data.    Electronically Signed By: NADJA Núñez CRNA  July 29, 2022  6:27 PM

## 2022-07-29 NOTE — BRIEF OP NOTE
[facility] Brief Operative Note    Pre-operative diagnosis: Biliary dyskinesia [K82.8]   Post-operative diagnosis Biliary dyskinesia     Procedure: Procedure(s):  CHOLECYSTECTOMY, LAPAROSCOPIC, possible open   Surgeon(s): Surgeon(s) and Role:     * Edwardo Madison,  - Primary   Estimated blood loss: 5ml    Specimens: ID Type Source Tests Collected by Time Destination   1 : Gallbladder and contents Tissue Gallbladder SURGICAL PATHOLOGY EXAM Edwardo Madison DO 7/29/2022  4:14 PM       Findings:  No gross abnormalities

## 2022-08-02 LAB
PATH REPORT.COMMENTS IMP SPEC: NORMAL
PATH REPORT.COMMENTS IMP SPEC: NORMAL
PATH REPORT.FINAL DX SPEC: NORMAL
PATH REPORT.GROSS SPEC: NORMAL
PATH REPORT.MICROSCOPIC SPEC OTHER STN: NORMAL
PATH REPORT.RELEVANT HX SPEC: NORMAL
PHOTO IMAGE: NORMAL

## 2022-08-05 ENCOUNTER — OFFICE VISIT (OUTPATIENT)
Dept: SURGERY | Facility: CLINIC | Age: 32
End: 2022-08-05
Payer: COMMERCIAL

## 2022-08-05 VITALS
SYSTOLIC BLOOD PRESSURE: 100 MMHG | WEIGHT: 190 LBS | BODY MASS INDEX: 33.66 KG/M2 | TEMPERATURE: 97.4 F | DIASTOLIC BLOOD PRESSURE: 60 MMHG | HEIGHT: 63 IN

## 2022-08-05 DIAGNOSIS — Z90.49 S/P LAPAROSCOPIC CHOLECYSTECTOMY: Primary | ICD-10-CM

## 2022-08-05 PROCEDURE — 99024 POSTOP FOLLOW-UP VISIT: CPT | Performed by: SURGERY

## 2022-08-05 NOTE — LETTER
2022         RE: Leah Cox  [address]  [facility] [address]        Dear Colleague,    Thank you for referring your patient, Leah Cox, to the [facility]. Please see a copy of my visit note below.    General Surgery Follow Up    Pt returns for follow up visit s/p lap beto    HPI:  Doing well. Tolerating a diet without issue. Soreness in the upper abdomen. No other concerns.      Past Medical History:   Diagnosis Date     ASCUS with positive high risk HPV cervical 2012     Excessive or frequent menstruation 3/14/2017     H/O colposcopy with cervical biopsy 2012    SUZAN 2-3     S/P LEEP of cervix 2013    SUZAN 2 with free margins       Past Surgical History:   Procedure Laterality Date     LAPAROSCOPIC CHOLECYSTECTOMY N/A 2022    Procedure: Laparoscopic cholecystectomy;  Surgeon: Edwardo Madison DO;  Location: [facility] OR     LEEP TX, CERVICAL  2013    SUZAN 2 with free margins - Care Everywhere.     RELEASE CARPAL TUNNEL Right 10/5/2016    Procedure: RELEASE CARPAL TUNNEL;  Surgeon: Christian Sebastian MD;  Location: [facility] OR       Social History     Socioeconomic History     Marital status: Single     Spouse name: Not on file     Number of children: Not on file     Years of education: Not on file     Highest education level: Not on file   Occupational History     Not on file   Tobacco Use     Smoking status: Former Smoker     Packs/day: 0.25     Types: Cigarettes     Quit date: 2022     Years since quittin.0     Smokeless tobacco: Never Used   Vaping Use     Vaping Use: Never used   Substance and Sexual Activity     Alcohol use: Yes     Alcohol/week: 0.0 standard drinks     Comment: occ.     Drug use: No     Sexual activity: Yes     Partners: Male     Birth control/protection: OCP   Other Topics Concern     Parent/sibling w/ CABG, MI or angioplasty before 65F 55M? Not Asked   Social History Narrative     Not on file     Social  "Determinants of Health     Financial Resource Strain: Not on file   Food Insecurity: Not on file   Transportation Needs: Not on file   Physical Activity: Not on file   Stress: Not on file   Social Connections: Not on file   Intimate Partner Violence: Not on file   Housing Stability: Not on file       Current Outpatient Medications   Medication Sig Dispense Refill     acetaminophen (TYLENOL) 500 MG tablet Take 500-1,000 mg by mouth every 6 hours as needed for mild pain       ibuprofen (ADVIL/MOTRIN) 200 MG capsule Take 400 mg by mouth every 4 hours as needed for fever       levothyroxine (SYNTHROID/LEVOTHROID) 112 MCG tablet TAKE 1 TABLET (112 MCG) BY MOUTH DAILY 90 tablet 3     oxyCODONE-acetaminophen (PERCOCET) 5-325 MG tablet Take 1-2 tablets by mouth every 4 hours as needed for pain 12 tablet 0       Medications and history reviewed    Physical exam:  Vitals: /60   Temp 97.4  F (36.3  C) (Temporal)   Ht 1.6 m (5' 3\")   Wt 86.2 kg (190 lb)   BMI 33.66 kg/m    BMI= Body mass index is 33.66 kg/m .    HEART: RRR, no new murmurs  LUNGS: CTAB, equal chest rise, good effort  ABD: soft, non tender, non distended  INCISIONS: c/d/i  EXT: JOSE, no deformities    PATHOLOGY:  Chronic cholecystitis    Assessment:     ICD-10-CM    1. S/P laparoscopic cholecystectomy  Z90.49      Plan: Doing well. Path reviewed and questions answered. Follow-up prn.    Edwardo Madison DO        Again, thank you for allowing me to participate in the care of your patient.        Sincerely,        Edwardo Madison, DO    "

## 2022-08-05 NOTE — PROGRESS NOTES
General Surgery Follow Up    Pt returns for follow up visit s/p lap beto    HPI:  Doing well. Tolerating a diet without issue. Soreness in the upper abdomen. No other concerns.      Past Medical History:   Diagnosis Date     ASCUS with positive high risk HPV cervical 2012     Excessive or frequent menstruation 3/14/2017     H/O colposcopy with cervical biopsy 2012    SUZAN 2-3     S/P LEEP of cervix 2013    SUZAN 2 with free margins       Past Surgical History:   Procedure Laterality Date     LAPAROSCOPIC CHOLECYSTECTOMY N/A 2022    Procedure: Laparoscopic cholecystectomy;  Surgeon: Edwardo Madison DO;  [facility]: [facility]     LEEP TX, CERVICAL  2013    SUZAN 2 with free margins - Care Everywhere.     RELEASE CARPAL TUNNEL Right 10/5/2016    Procedure: RELEASE CARPAL TUNNEL;  Surgeon: Christian Sebastian MD;  Location: [facility]       Social History     Socioeconomic History     Marital status: Single     Spouse name: Not on file     Number of children: Not on file     Years of education: Not on file     Highest education level: Not on file   Occupational History     Not on file   Tobacco Use     Smoking status: Former Smoker     Packs/day: 0.25     Types: Cigarettes     Quit date: 2022     Years since quittin.0     Smokeless tobacco: Never Used   Vaping Use     Vaping Use: Never used   Substance and Sexual Activity     Alcohol use: Yes     Alcohol/week: 0.0 standard drinks     Comment: occ.     Drug use: No     Sexual activity: Yes     Partners: Male     Birth control/protection: OCP   Other Topics Concern     Parent/sibling w/ CABG, MI or angioplasty before 65F 55M? Not Asked   Social History Narrative     Not on file     Social Determinants of Health     Financial Resource Strain: Not on file   Food Insecurity: Not on file   Transportation Needs: Not on file   Physical Activity: Not on file   Stress: Not on file   Social Connections: Not on file   Intimate Partner Violence: Not  "on file   Housing Stability: Not on file       Current Outpatient Medications   Medication Sig Dispense Refill     acetaminophen (TYLENOL) 500 MG tablet Take 500-1,000 mg by mouth every 6 hours as needed for mild pain       ibuprofen (ADVIL/MOTRIN) 200 MG capsule Take 400 mg by mouth every 4 hours as needed for fever       levothyroxine (SYNTHROID/LEVOTHROID) 112 MCG tablet TAKE 1 TABLET (112 MCG) BY MOUTH DAILY 90 tablet 3     oxyCODONE-acetaminophen (PERCOCET) 5-325 MG tablet Take 1-2 tablets by mouth every 4 hours as needed for pain 12 tablet 0       Medications and history reviewed    Physical exam:  Vitals: /60   Temp 97.4  F (36.3  C) (Temporal)   Ht 1.6 m (5' 3\")   Wt 86.2 kg (190 lb)   BMI 33.66 kg/m    BMI= Body mass index is 33.66 kg/m .    HEART: RRR, no new murmurs  LUNGS: CTAB, equal chest rise, good effort  ABD: soft, non tender, non distended  INCISIONS: c/d/i  EXT: JOSE, no deformities    PATHOLOGY:  Chronic cholecystitis    Assessment:     ICD-10-CM    1. S/P laparoscopic cholecystectomy  Z90.49      Plan: Doing well. Path reviewed and questions answered. Follow-up prn.    Edwardo Madison,     "

## 2022-08-05 NOTE — PROGRESS NOTES
Patient brought FMLA forms to appointment. Forms were filled out and finished. Writer called patient to inform. Patient will stop by and  forms at front specialty desk.  Kei CROWDER CMA

## 2022-08-11 ENCOUNTER — MYC MEDICAL ADVICE (OUTPATIENT)
Dept: SURGERY | Facility: CLINIC | Age: 32
End: 2022-08-11

## 2022-08-16 ENCOUNTER — TELEPHONE (OUTPATIENT)
Dept: FAMILY MEDICINE | Facility: CLINIC | Age: 32
End: 2022-08-16

## 2022-08-16 NOTE — TELEPHONE ENCOUNTER
Patient called nurse triage asking what she can eat and when to prep for colonoscopy.   Patient transferred to specialty care for preop instructions.   Tod Mosquera RN

## 2022-08-17 ENCOUNTER — ANESTHESIA EVENT (OUTPATIENT)
Dept: GASTROENTEROLOGY | Facility: CLINIC | Age: 32
End: 2022-08-17
Payer: COMMERCIAL

## 2022-08-17 ASSESSMENT — LIFESTYLE VARIABLES: TOBACCO_USE: 1

## 2022-08-18 ENCOUNTER — HOSPITAL ENCOUNTER (OUTPATIENT)
Facility: CLINIC | Age: 32
Discharge: HOME | End: 2022-08-18
Attending: SURGERY | Admitting: SURGERY
Payer: COMMERCIAL

## 2022-08-18 ENCOUNTER — ANESTHESIA (OUTPATIENT)
Dept: GASTROENTEROLOGY | Facility: CLINIC | Age: 32
End: 2022-08-18
Payer: COMMERCIAL

## 2022-08-18 VITALS
DIASTOLIC BLOOD PRESSURE: 80 MMHG | RESPIRATION RATE: 16 BRPM | TEMPERATURE: 97.9 F | HEART RATE: 64 BPM | OXYGEN SATURATION: 98 % | SYSTOLIC BLOOD PRESSURE: 115 MMHG | WEIGHT: 190 LBS | BODY MASS INDEX: 33.66 KG/M2

## 2022-08-18 LAB — COLONOSCOPY: NORMAL

## 2022-08-18 PROCEDURE — 250N000009 HC RX 250: Performed by: NURSE ANESTHETIST, CERTIFIED REGISTERED

## 2022-08-18 PROCEDURE — 45385 COLONOSCOPY W/LESION REMOVAL: CPT | Performed by: SURGERY

## 2022-08-18 PROCEDURE — 88305 TISSUE EXAM BY PATHOLOGIST: CPT | Mod: TC | Performed by: SURGERY

## 2022-08-18 PROCEDURE — 370N000017 HC ANESTHESIA TECHNICAL FEE, PER MIN: Performed by: SURGERY

## 2022-08-18 PROCEDURE — 88305 TISSUE EXAM BY PATHOLOGIST: CPT | Mod: 26 | Performed by: PATHOLOGY

## 2022-08-18 PROCEDURE — 250N000011 HC RX IP 250 OP 636: Performed by: NURSE ANESTHETIST, CERTIFIED REGISTERED

## 2022-08-18 PROCEDURE — 258N000003 HC RX IP 258 OP 636: Performed by: NURSE ANESTHETIST, CERTIFIED REGISTERED

## 2022-08-18 PROCEDURE — 45385 COLONOSCOPY W/LESION REMOVAL: CPT | Mod: PT | Performed by: SURGERY

## 2022-08-18 RX ORDER — SODIUM CHLORIDE, SODIUM LACTATE, POTASSIUM CHLORIDE, CALCIUM CHLORIDE 600; 310; 30; 20 MG/100ML; MG/100ML; MG/100ML; MG/100ML
INJECTION, SOLUTION INTRAVENOUS CONTINUOUS PRN
Status: DISCONTINUED | OUTPATIENT
Start: 2022-08-18 | End: 2022-08-18

## 2022-08-18 RX ORDER — LIDOCAINE 40 MG/G
CREAM TOPICAL
Status: DISCONTINUED | OUTPATIENT
Start: 2022-08-18 | End: 2022-08-18 | Stop reason: HOSPADM

## 2022-08-18 RX ORDER — PROCHLORPERAZINE MALEATE 5 MG
10 TABLET ORAL EVERY 6 HOURS PRN
Status: DISCONTINUED | OUTPATIENT
Start: 2022-08-18 | End: 2022-08-18 | Stop reason: HOSPADM

## 2022-08-18 RX ORDER — NALOXONE HYDROCHLORIDE 0.4 MG/ML
0.4 INJECTION, SOLUTION INTRAMUSCULAR; INTRAVENOUS; SUBCUTANEOUS
Status: DISCONTINUED | OUTPATIENT
Start: 2022-08-18 | End: 2022-08-18 | Stop reason: HOSPADM

## 2022-08-18 RX ORDER — SODIUM CHLORIDE, SODIUM LACTATE, POTASSIUM CHLORIDE, CALCIUM CHLORIDE 600; 310; 30; 20 MG/100ML; MG/100ML; MG/100ML; MG/100ML
INJECTION, SOLUTION INTRAVENOUS CONTINUOUS
Status: DISCONTINUED | OUTPATIENT
Start: 2022-08-18 | End: 2022-08-18 | Stop reason: HOSPADM

## 2022-08-18 RX ORDER — LIDOCAINE HYDROCHLORIDE 20 MG/ML
INJECTION, SOLUTION INFILTRATION; PERINEURAL PRN
Status: DISCONTINUED | OUTPATIENT
Start: 2022-08-18 | End: 2022-08-18

## 2022-08-18 RX ORDER — FLUMAZENIL 0.1 MG/ML
0.2 INJECTION, SOLUTION INTRAVENOUS
Status: DISCONTINUED | OUTPATIENT
Start: 2022-08-18 | End: 2022-08-18 | Stop reason: HOSPADM

## 2022-08-18 RX ORDER — ONDANSETRON 2 MG/ML
4 INJECTION INTRAMUSCULAR; INTRAVENOUS EVERY 6 HOURS PRN
Status: DISCONTINUED | OUTPATIENT
Start: 2022-08-18 | End: 2022-08-18 | Stop reason: HOSPADM

## 2022-08-18 RX ORDER — NALOXONE HYDROCHLORIDE 0.4 MG/ML
0.2 INJECTION, SOLUTION INTRAMUSCULAR; INTRAVENOUS; SUBCUTANEOUS
Status: DISCONTINUED | OUTPATIENT
Start: 2022-08-18 | End: 2022-08-18 | Stop reason: HOSPADM

## 2022-08-18 RX ORDER — PROPOFOL 10 MG/ML
INJECTION, EMULSION INTRAVENOUS CONTINUOUS PRN
Status: DISCONTINUED | OUTPATIENT
Start: 2022-08-18 | End: 2022-08-18

## 2022-08-18 RX ORDER — ONDANSETRON 2 MG/ML
4 INJECTION INTRAMUSCULAR; INTRAVENOUS
Status: DISCONTINUED | OUTPATIENT
Start: 2022-08-18 | End: 2022-08-18 | Stop reason: HOSPADM

## 2022-08-18 RX ORDER — ONDANSETRON 4 MG/1
4 TABLET, ORALLY DISINTEGRATING ORAL EVERY 6 HOURS PRN
Status: DISCONTINUED | OUTPATIENT
Start: 2022-08-18 | End: 2022-08-18 | Stop reason: HOSPADM

## 2022-08-18 RX ADMIN — LIDOCAINE HYDROCHLORIDE 100 MG: 20 INJECTION, SOLUTION INFILTRATION; PERINEURAL at 09:38

## 2022-08-18 RX ADMIN — PROPOFOL 200 MCG/KG/MIN: 10 INJECTION, EMULSION INTRAVENOUS at 09:38

## 2022-08-18 RX ADMIN — SODIUM CHLORIDE, POTASSIUM CHLORIDE, SODIUM LACTATE AND CALCIUM CHLORIDE: 600; 310; 30; 20 INJECTION, SOLUTION INTRAVENOUS at 09:35

## 2022-08-18 ASSESSMENT — ACTIVITIES OF DAILY LIVING (ADL): ADLS_ACUITY_SCORE: 33

## 2022-08-18 NOTE — DISCHARGE INSTRUCTIONS
[facility]    Home Care Following Endoscopy          Activity:  You have just undergone an endoscopic procedure usually performed with conscious sedation.  Do not work or operate machinery (including a car) for at least 12 hours.    I encourage you to walk and attempt to pass this air as soon as possible.    Diet:  Return to the diet you were on before your procedure but eat lightly for the first 12-24 hours.  Drink plenty of water.  Resume any regular medications unless otherwise advised by your physician.  Please begin any new medication prescribed as a result of your procedure as directed by your physician.   If you had any biopsy or polyp removed please refrain from aspirin or aspirin products for 2 days.  If on Coumadin please restart as instructed by your physician.   Pain:  You may take Tylenol as needed for pain.  Expected Recovery:  You can expect some mild abdominal fullness and/or discomfort due to the air used to inflate your intestinal tract. It is also normal to have a mild sore throat after upper endoscopy.    Call Your Physician if You Have:  After Colonoscopy:  Worsening persisting abdominal pain which is worse with activity.  Fevers (>101 degrees F), chills or shakes.  Passage of continued blood with bowel movements.   Any questions or concerns about your recovery, please call [phone] or after hours [phone] Nurse Advice Line.    Follow-up Care:  You did have polyps/biopsy tissue sample(s) removed.  The polyps/biopsy tissue sample(s) will be sent to pathology.  You should receive letter in your My Chart from Dr Madison with your results within 1-2 weeks. If you do not participate in My Chart a physical letter will come in the mail in 2-3 weeks.  Please call if you have not received a notification of your results.  If asked to return to clinic please make an appointment 1 week after your procedure.  Call [phone].

## 2022-08-18 NOTE — ANESTHESIA PREPROCEDURE EVALUATION
Anesthesia Pre-Procedure Evaluation    Patient: Leah Cox   MRN: [id number] : 1990        Procedure : Procedure(s):  COLONOSCOPY          Past Medical History:   Diagnosis Date     ASCUS with positive high risk HPV cervical 2012     Excessive or frequent menstruation 3/14/2017     H/O colposcopy with cervical biopsy 2012    SUZAN 2-3     S/P LEEP of cervix 2013    SUZAN 2 with free margins      Past Surgical History:   Procedure Laterality Date     LAPAROSCOPIC CHOLECYSTECTOMY N/A 2022    Procedure: Laparoscopic cholecystectomy;  Surgeon: Edwardo Madison DO;  Location: [facility]     LEEP TX, CERVICAL  2013    SUZAN 2 with free margins - Care Everywhere.     RELEASE CARPAL TUNNEL Right 10/5/2016    Procedure: RELEASE CARPAL TUNNEL;  Surgeon: Christian Sebastian MD;  Location: [facility] OR      Allergies   Allergen Reactions     Cyclobenzaprine      Started giving her dreams      Social History     Tobacco Use     Smoking status: Former Smoker     Packs/day: 0.25     Types: Cigarettes     Quit date: 2022     Years since quittin.1     Smokeless tobacco: Never Used   Substance Use Topics     Alcohol use: Yes     Alcohol/week: 0.0 standard drinks     Comment: occ.      Wt Readings from Last 1 Encounters:   22 86.2 kg (190 lb)        Anesthesia Evaluation   Pt has had prior anesthetic. Type: General and MAC.    No history of anesthetic complications       ROS/MED HX  ENT/Pulmonary:     (+) JOURDAN risk factors, snores loudly, tobacco use, Past use,     Neurologic:  - neg neurologic ROS     Cardiovascular:  - neg cardiovascular ROS     METS/Exercise Tolerance: >4 METS    Hematologic:  - neg hematologic  ROS     Musculoskeletal:  - neg musculoskeletal ROS     GI/Hepatic:  - neg GI/hepatic ROS     Renal/Genitourinary:  - neg Renal ROS     Endo:     (+) thyroid problem, hypothyroidism,     Psychiatric/Substance Use:     (+) psychiatric history depression and anxiety      Infectious Disease:  - neg infectious disease ROS     Malignancy:  - neg malignancy ROS     Other:  - neg other ROS          Physical Exam    Airway        Mallampati: II   TM distance: > 3 FB   Neck ROM: full   Mouth opening: > 3 cm    Respiratory Devices and Support         Dental  no notable dental history         Cardiovascular   cardiovascular exam normal       Rhythm and rate: regular and normal     Pulmonary   pulmonary exam normal        breath sounds clear to auscultation           OUTSIDE LABS:  CBC:   Lab Results   Component Value Date    WBC 9.4 06/03/2022    WBC 12.4 (H) 11/07/2018    HGB 14.9 06/03/2022    HGB 11.9 04/05/2021    HCT 42.8 06/03/2022    HCT 41.9 11/07/2018     06/03/2022     11/07/2018     BMP:   Lab Results   Component Value Date     06/03/2022     11/07/2018    POTASSIUM 3.8 06/03/2022    POTASSIUM 3.7 11/07/2018    CHLORIDE 110 (H) 06/03/2022    CHLORIDE 103 11/07/2018    CO2 28 06/03/2022    CO2 29 11/07/2018    BUN 10 06/03/2022    BUN 13 11/07/2018    CR 0.71 06/03/2022    CR 0.77 11/07/2018    GLC 96 06/03/2022    GLC 78 11/07/2018     COAGS:   Lab Results   Component Value Date    INR 0.89 11/01/2017     POC:   Lab Results   Component Value Date    HCG Negative 07/29/2022    HCGS Negative 04/18/2019     HEPATIC:   Lab Results   Component Value Date    ALBUMIN 3.0 (L) 06/03/2022    PROTTOTAL 5.6 (L) 06/03/2022    ALT 36 06/03/2022    AST 20 06/03/2022    ALKPHOS 46 06/03/2022    BILITOTAL 0.3 06/03/2022     OTHER:   Lab Results   Component Value Date    A1C 4.9 10/19/2020    VOLODYMYR 8.3 (L) 06/03/2022    LIPASE 78 06/03/2022    AMYLASE 31 08/02/2018    TSH 3.03 07/28/2022    T4 1.18 05/04/2022    CRP <2.9 06/03/2022       Anesthesia Plan    ASA Status:  2   NPO Status:  NPO Appropriate    Anesthesia Type: MAC.   Induction: Intravenous, Propofol.   Maintenance: TIVA.        Consents    Anesthesia Plan(s) and associated risks, benefits, and realistic  alternatives discussed. Questions answered and patient/representative(s) expressed understanding.     - Discussed: Risks, Benefits and Alternatives for BOTH SEDATION and the PROCEDURE were discussed     - Discussed with:  Patient      - Extended Intubation/Ventilatory Support Discussed: No.      - Patient is DNR/DNI Status: No    Use of blood products discussed: No .     Postoperative Care    Pain management: IV analgesics.        Comments:    Other Comments: The risks and benefits of anesthesia, and the alternatives where applicable, have been discussed with the patient, and they wish to proceed.            NADJA Orozco CRNA

## 2022-08-18 NOTE — INTERVAL H&P NOTE
I have verified the history with the patient and following examination, there are no changes to the history and physical examination. The patient is cleared for anesthesia for proposed surgical procedure.

## 2022-08-18 NOTE — ANESTHESIA POSTPROCEDURE EVALUATION
Patient: Leah Cox    Procedure: Procedure(s):  COLONOSCOPY, FLEXIBLE, WITH LESION REMOVAL USING SNARE       Anesthesia Type:  MAC    Note:  Disposition: Outpatient   Postop Pain Control: Uneventful            Sign Out: Well controlled pain   PONV: No   Neuro/Psych: Uneventful            Sign Out: Acceptable/Baseline neuro status   Airway/Respiratory: Uneventful            Sign Out: Acceptable/Baseline resp. status   CV/Hemodynamics: Uneventful            Sign Out: Acceptable CV status   Other NRE: NONE   DID A NON-ROUTINE EVENT OCCUR? No    Event details/Postop Comments:  Pt was happy with anesthesia care.  No complications.  I will follow up with the pt if needed.           Last vitals:  Vitals Value Taken Time   /80 08/18/22 1019   Temp     Pulse 64 08/18/22 1019   Resp     SpO2 98 % 08/18/22 1007   Vitals shown include unvalidated device data.    Electronically Signed By: NADJA Bill CRNA  August 18, 2022  1:18 PM

## 2022-08-18 NOTE — ANESTHESIA CARE TRANSFER NOTE
Patient: Laeh Cox    Procedure: Procedure(s):  COLONOSCOPY, FLEXIBLE, WITH LESION REMOVAL USING SNARE       Diagnosis: Abdominal pain, generalized [R10.84]  Diarrhea, unspecified type [R19.7]  Diagnosis Additional Information: No value filed.    Anesthesia Type:   MAC     Note:    Oropharynx: oropharynx clear of all foreign objects and spontaneously breathing  Level of Consciousness: awake  Oxygen Supplementation: room air    Independent Airway: airway patency satisfactory and stable  Dentition: dentition unchanged  Vital Signs Stable: post-procedure vital signs reviewed and stable  Report to RN Given: handoff report given  Patient transferred to: Phase II    Handoff Report: Identifed the Patient, Identified the Reponsible Provider, Reviewed the pertinent medical history, Discussed the surgical course, Reviewed Intra-OP anesthesia mangement and issues during anesthesia, Set expectations for post-procedure period and Allowed opportunity for questions and acknowledgement of understanding      Vitals:  Vitals Value Taken Time   BP     Temp     Pulse     Resp     SpO2         Electronically Signed By: NADJA Bill CRNA  August 18, 2022  9:58 AM

## 2022-08-18 NOTE — LETTER
Leah Cox  [address]  [place] [address]    August 23, 2022      Dear [place],  This letter is to inform you of the results of your pathology report from your colonoscopy.  Your pathology report was:  Final Diagnosis   Large intestine, rectum, polypectomy:  -Tubular adenoma, no evidence of high-grade dysplasia or invasive carcinoma   These are benign polyps. These types of polyps do carry a small risk of developing into a cancer over time if not removed. Yours were completely removed at the time of your colonoscopy. You should have another surveillance colonoscopy in 7 years.  If you have further questions please don t hesitate to call our clinic at [phone].   Sincerely,     Edwardo Madison, DO

## 2022-09-03 ENCOUNTER — HEALTH MAINTENANCE LETTER (OUTPATIENT)
Age: 32
End: 2022-09-03

## 2022-11-30 ENCOUNTER — APPOINTMENT (OUTPATIENT)
Dept: GENERAL RADIOLOGY | Facility: CLINIC | Age: 32
End: 2022-11-30
Attending: FAMILY MEDICINE
Payer: COMMERCIAL

## 2022-11-30 ENCOUNTER — HOSPITAL ENCOUNTER (EMERGENCY)
Facility: CLINIC | Age: 32
Discharge: HOME | End: 2022-11-30
Attending: FAMILY MEDICINE | Admitting: FAMILY MEDICINE
Payer: COMMERCIAL

## 2022-11-30 VITALS
RESPIRATION RATE: 18 BRPM | OXYGEN SATURATION: 98 % | TEMPERATURE: 97.5 F | DIASTOLIC BLOOD PRESSURE: 88 MMHG | SYSTOLIC BLOOD PRESSURE: 136 MMHG | HEART RATE: 76 BPM

## 2022-11-30 DIAGNOSIS — J20.9 ACUTE BRONCHITIS, UNSPECIFIED ORGANISM: ICD-10-CM

## 2022-11-30 DIAGNOSIS — R51.9 ACUTE NONINTRACTABLE HEADACHE, UNSPECIFIED HEADACHE TYPE: ICD-10-CM

## 2022-11-30 DIAGNOSIS — R11.2 NAUSEA AND VOMITING, UNSPECIFIED VOMITING TYPE: ICD-10-CM

## 2022-11-30 LAB
ANION GAP SERPL CALCULATED.3IONS-SCNC: 4 MMOL/L (ref 3–14)
BASOPHILS # BLD AUTO: 0.1 10E3/UL (ref 0–0.2)
BASOPHILS NFR BLD AUTO: 1 %
BUN SERPL-MCNC: 14 MG/DL (ref 7–30)
CALCIUM SERPL-MCNC: 8.2 MG/DL (ref 8.5–10.1)
CHLORIDE BLD-SCNC: 114 MMOL/L (ref 94–109)
CO2 SERPL-SCNC: 25 MMOL/L (ref 20–32)
CREAT SERPL-MCNC: 0.61 MG/DL (ref 0.52–1.04)
EOSINOPHIL # BLD AUTO: 0.3 10E3/UL (ref 0–0.7)
EOSINOPHIL NFR BLD AUTO: 3 %
ERYTHROCYTE [DISTWIDTH] IN BLOOD BY AUTOMATED COUNT: 12.4 % (ref 10–15)
FLUAV AG SPEC QL IA: NEGATIVE
FLUBV AG SPEC QL IA: NEGATIVE
GFR SERPL CREATININE-BSD FRML MDRD: >90 ML/MIN/1.73M2
GLUCOSE BLD-MCNC: 103 MG/DL (ref 70–99)
HCT VFR BLD AUTO: 40.9 % (ref 35–47)
HGB BLD-MCNC: 14 G/DL (ref 11.7–15.7)
IMM GRANULOCYTES # BLD: 0.1 10E3/UL
IMM GRANULOCYTES NFR BLD: 1 %
LYMPHOCYTES # BLD AUTO: 2.2 10E3/UL (ref 0.8–5.3)
LYMPHOCYTES NFR BLD AUTO: 19 %
MCH RBC QN AUTO: 31.1 PG (ref 26.5–33)
MCHC RBC AUTO-ENTMCNC: 34.2 G/DL (ref 31.5–36.5)
MCV RBC AUTO: 91 FL (ref 78–100)
MONOCYTES # BLD AUTO: 0.7 10E3/UL (ref 0–1.3)
MONOCYTES NFR BLD AUTO: 6 %
NEUTROPHILS # BLD AUTO: 8.1 10E3/UL (ref 1.6–8.3)
NEUTROPHILS NFR BLD AUTO: 70 %
NRBC # BLD AUTO: 0 10E3/UL
NRBC BLD AUTO-RTO: 0 /100
PLATELET # BLD AUTO: 287 10E3/UL (ref 150–450)
POTASSIUM BLD-SCNC: 4.1 MMOL/L (ref 3.4–5.3)
RBC # BLD AUTO: 4.5 10E6/UL (ref 3.8–5.2)
RSV AG SPEC QL: NEGATIVE
SODIUM SERPL-SCNC: 143 MMOL/L (ref 133–144)
WBC # BLD AUTO: 11.5 10E3/UL (ref 4–11)

## 2022-11-30 PROCEDURE — 99284 EMERGENCY DEPT VISIT MOD MDM: CPT | Mod: 25

## 2022-11-30 PROCEDURE — 85004 AUTOMATED DIFF WBC COUNT: CPT | Performed by: FAMILY MEDICINE

## 2022-11-30 PROCEDURE — 258N000003 HC RX IP 258 OP 636: Performed by: FAMILY MEDICINE

## 2022-11-30 PROCEDURE — 71045 X-RAY EXAM CHEST 1 VIEW: CPT

## 2022-11-30 PROCEDURE — 99284 EMERGENCY DEPT VISIT MOD MDM: CPT | Performed by: FAMILY MEDICINE

## 2022-11-30 PROCEDURE — 87807 RSV ASSAY W/OPTIC: CPT | Performed by: FAMILY MEDICINE

## 2022-11-30 PROCEDURE — 250N000011 HC RX IP 250 OP 636: Performed by: FAMILY MEDICINE

## 2022-11-30 PROCEDURE — 96361 HYDRATE IV INFUSION ADD-ON: CPT

## 2022-11-30 PROCEDURE — 36415 COLL VENOUS BLD VENIPUNCTURE: CPT | Performed by: FAMILY MEDICINE

## 2022-11-30 PROCEDURE — 87804 INFLUENZA ASSAY W/OPTIC: CPT | Performed by: FAMILY MEDICINE

## 2022-11-30 PROCEDURE — 96374 THER/PROPH/DIAG INJ IV PUSH: CPT

## 2022-11-30 PROCEDURE — 96375 TX/PRO/DX INJ NEW DRUG ADDON: CPT

## 2022-11-30 PROCEDURE — 80048 BASIC METABOLIC PNL TOTAL CA: CPT | Performed by: FAMILY MEDICINE

## 2022-11-30 RX ORDER — DIPHENHYDRAMINE HYDROCHLORIDE 50 MG/ML
25 INJECTION INTRAMUSCULAR; INTRAVENOUS ONCE
Status: COMPLETED | OUTPATIENT
Start: 2022-11-30 | End: 2022-11-30

## 2022-11-30 RX ORDER — DEXTROMETHORPHAN POLISTIREX 30 MG/5ML
60 SUSPENSION ORAL 2 TIMES DAILY
Refills: 0 | COMMUNITY
Start: 2022-11-30 | End: 2023-08-03

## 2022-11-30 RX ORDER — DOXYCYCLINE 100 MG/1
100 CAPSULE ORAL 2 TIMES DAILY
Qty: 14 CAPSULE | Refills: 0 | Status: SHIPPED | OUTPATIENT
Start: 2022-11-30 | End: 2023-08-03

## 2022-11-30 RX ORDER — METOCLOPRAMIDE HYDROCHLORIDE 5 MG/ML
10 INJECTION INTRAMUSCULAR; INTRAVENOUS ONCE
Status: COMPLETED | OUTPATIENT
Start: 2022-11-30 | End: 2022-11-30

## 2022-11-30 RX ORDER — GUAIFENESIN 600 MG/1
1-2 TABLET, EXTENDED RELEASE ORAL 2 TIMES DAILY
Qty: 56 TABLET | Refills: 0 | COMMUNITY
Start: 2022-11-30 | End: 2022-12-14

## 2022-11-30 RX ORDER — KETOROLAC TROMETHAMINE 30 MG/ML
30 INJECTION, SOLUTION INTRAMUSCULAR; INTRAVENOUS ONCE
Status: COMPLETED | OUTPATIENT
Start: 2022-11-30 | End: 2022-11-30

## 2022-11-30 RX ORDER — SODIUM CHLORIDE 9 MG/ML
INJECTION, SOLUTION INTRAVENOUS CONTINUOUS
Status: DISCONTINUED | OUTPATIENT
Start: 2022-11-30 | End: 2022-11-30 | Stop reason: HOSPADM

## 2022-11-30 RX ORDER — ONDANSETRON 4 MG/1
4 TABLET, ORALLY DISINTEGRATING ORAL EVERY 6 HOURS PRN
Qty: 10 TABLET | Refills: 0 | Status: SHIPPED | OUTPATIENT
Start: 2022-11-30 | End: 2023-08-03

## 2022-11-30 RX ADMIN — SODIUM CHLORIDE 1000 ML: 9 INJECTION, SOLUTION INTRAVENOUS at 05:18

## 2022-11-30 RX ADMIN — DIPHENHYDRAMINE HYDROCHLORIDE 25 MG: 50 INJECTION, SOLUTION INTRAMUSCULAR; INTRAVENOUS at 05:42

## 2022-11-30 RX ADMIN — KETOROLAC TROMETHAMINE 30 MG: 30 INJECTION, SOLUTION INTRAMUSCULAR at 05:18

## 2022-11-30 RX ADMIN — METOCLOPRAMIDE HYDROCHLORIDE 10 MG: 5 INJECTION INTRAMUSCULAR; INTRAVENOUS at 05:43

## 2022-11-30 ASSESSMENT — ACTIVITIES OF DAILY LIVING (ADL): ADLS_ACUITY_SCORE: 35

## 2022-11-30 NOTE — Clinical Note
Leah Cox was seen and treated in our emergency department on 11/30/2022.    Please excuse from work due to illness.  She may return when improved.     Sincerely,     [facility]

## 2022-11-30 NOTE — ED TRIAGE NOTES
Presents with ongoing cough 3 weeks, is a smoker, states not been around anyone sick. Is also concerned about a headache that started this morning, radiates from forehead to temples. Has not self medicated PTA.     Triage Assessment     Row Name 11/30/22 0448       Triage Assessment (Adult)    Airway WDL WDL       Respiratory WDL    Respiratory WDL X;cough    Cough Frequency infrequent    Cough Type productive       Skin Circulation/Temperature WDL    Skin Circulation/Temperature WDL WDL       Cardiac WDL    Cardiac WDL WDL       Peripheral/Neurovascular WDL    Peripheral Neurovascular WDL WDL       Cognitive/Neuro/Behavioral WDL    Cognitive/Neuro/Behavioral WDL WDL       [place] Coma Scale    Best Eye Response 4-->(E4) spontaneous    Best Motor Response 6-->(M6) obeys commands    Best Verbal Response 5-->(V5) oriented    [place] Coma Scale Score 15

## 2022-11-30 NOTE — ED PROVIDER NOTES
History     Chief Complaint   Patient presents with     Cough     HPI  Leah Cox is a 32 year old female who presents to the ED this morning with a cough that has been going on for about the past 3 weeks but has now become productive of yellowish-green sputum.  She denies any fevers but did feel cold earlier tonight.  Woke up this morning with a migraine.  Typical headache for her but a bit more severe.  Moderate in intensity.  Is in the frontal region aches radiates around the sides to the back of her head.  No associated nausea.  She did have emesis x1 after a coughing episode.  Just brought up some phlegm.  No food.  She denies any underlying lung disease.  Does smoke for 5 cigarettes a day.  Will get 3 or 4 headaches a month that she typically treats with Tylenol/ibuprofen at home.  Has never used any prescription preventative or abortive medications.        Allergies:  Allergies   Allergen Reactions     Cyclobenzaprine      Started giving her dreams       Problem List:    Patient Active Problem List    Diagnosis Date Noted     Tobacco use disorder 05/01/2018     Priority: Medium     Major depressive disorder, recurrent episode, moderate (H) 04/20/2018     Priority: Medium     RENAY (generalized anxiety disorder) 04/20/2018     Priority: Medium     Irregular periods 12/08/2015     Priority: Medium     S/P LEEP of cervix 01/23/2013     Priority: Medium     5/15/09 NIL pap  6/30/11 ASCUS pap, + HR HPV.   8/30/11 Cyril bx: SUZAN 2, ECC SUZAN 1  3/27/12 Cyril bx: SUZAN 1, ECC: neg.  Pap: NIL, + HR HPV  9/26/12 ASCUS pap, + HR HPV  11/7/12 Cyril bx: SUZAN 2, ECC: SUZAN 1.   1/23/13 LEEP: SUZAN 2 with free margins.   6/25/13 NIL pap     ** above information is from Care Everywhere  **  12/8/15 NIL pap, neg HPV. Plan: cotest in 1 yr, per Staff message from Dr. Farnsworth dated 2/5/18.  4/13/18 NIL pap, neg HR HPV.Plan: cotest in 3 years.          Past Medical History:    Past Medical History:   Diagnosis Date     ASCUS with  positive high risk HPV cervical 2012     Excessive or frequent menstruation 3/14/2017     H/O colposcopy with cervical biopsy 2012     S/P LEEP of cervix 2013       Past Surgical History:    Past Surgical History:   Procedure Laterality Date     COLONOSCOPY N/A 2022    Procedure: COLONOSCOPY, FLEXIBLE, WITH LESION REMOVAL USING SNARE;  Surgeon: Edwardo Madison DO;  Location: [facility]     LAPAROSCOPIC CHOLECYSTECTOMY N/A 2022    Procedure: Laparoscopic cholecystectomy;  Surgeon: Edwardo Madison DO;  Location: [facility]     LEEP TX, CERVICAL  2013    SUZAN 2 with free margins - [facility] Everywhere.     RELEASE CARPAL TUNNEL Right 10/5/2016    Procedure: RELEASE CARPAL TUNNEL;  Surgeon: Christian Sebastian MD;  Location: [facility]       Family History:    Family History   Problem Relation Age of Onset     Hypertension Father        Social History:  Marital Status:  Single [1]  Social History     Tobacco Use     Smoking status: Former     Packs/day: 0.25     Types: Cigarettes     Quit date: 2022     Years since quittin.4     Smokeless tobacco: Never   Vaping Use     Vaping Use: Never used   Substance Use Topics     Alcohol use: Yes     Alcohol/week: 0.0 standard drinks     Comment: occ.     Drug use: No        Medications:    dextromethorphan (DELSYM) 30 MG/5ML liquid  doxycycline monohydrate (MONODOX) 100 MG capsule  guaiFENesin (MUCINEX) 600 MG 12 hr tablet  ondansetron (ZOFRAN ODT) 4 MG ODT tab  acetaminophen (TYLENOL) 500 MG tablet  ibuprofen (ADVIL/MOTRIN) 200 MG capsule  levothyroxine (SYNTHROID/LEVOTHROID) 112 MCG tablet  oxyCODONE-acetaminophen (PERCOCET) 5-325 MG tablet          Review of Systems   All other systems reviewed and are negative.      Physical Exam   BP: (!) 140/91  Pulse: 85  Temp: 97.5  F (36.4  C)  Resp: 18  SpO2: 98 %      Physical Exam  Constitutional:       General: She is in acute distress (mild).      Appearance: Normal appearance.   HENT:       Nose: Congestion present.      Mouth/Throat:      Pharynx: Oropharynx is clear.   Cardiovascular:      Rate and Rhythm: Normal rate and regular rhythm.   Pulmonary:      Effort: Pulmonary effort is normal.      Breath sounds: Rhonchi present.   Musculoskeletal:         General: Normal range of motion.      Right lower leg: No edema.      Left lower leg: No edema.   Skin:     General: Skin is warm and dry.   Neurological:      General: No focal deficit present.      Mental Status: She is alert and oriented to person, place, and time.      GCS: GCS eye subscore is 4. GCS verbal subscore is 5. GCS motor subscore is 6.      Cranial Nerves: Cranial nerves 2-12 are intact.      Sensory: Sensation is intact.      Motor: Motor function is intact.   Psychiatric:         Mood and Affect: Mood normal.         ED Course                 Procedures              Critical Care time:  none               Results for orders placed or performed during the hospital encounter of 11/30/22 (from the past 24 hour(s))   CBC with platelets differential    Narrative    The following orders were created for panel order CBC with platelets differential.  Procedure                               Abnormality         Status                     ---------                               -----------         ------                     CBC with platelets and d...[[id number]]  Abnormal            Final result                 Please view results for these tests on the individual orders.   Basic metabolic panel   Result Value Ref Range    Sodium 143 133 - 144 mmol/L    Potassium 4.1 3.4 - 5.3 mmol/L    Chloride 114 (H) 94 - 109 mmol/L    Carbon Dioxide (CO2) 25 20 - 32 mmol/L    Anion Gap 4 3 - 14 mmol/L    Urea Nitrogen 14 7 - 30 mg/dL    Creatinine 0.61 0.52 - 1.04 mg/dL    Calcium 8.2 (L) 8.5 - 10.1 mg/dL    Glucose 103 (H) 70 - 99 mg/dL    GFR Estimate >90 >60 mL/min/1.73m2   CBC with platelets and differential   Result Value Ref Range    WBC Count 11.5 (H)  4.0 - 11.0 10e3/uL    RBC Count 4.50 3.80 - 5.20 10e6/uL    Hemoglobin 14.0 11.7 - 15.7 g/dL    Hematocrit 40.9 35.0 - 47.0 %    MCV 91 78 - 100 fL    MCH 31.1 26.5 - 33.0 pg    MCHC 34.2 31.5 - 36.5 g/dL    RDW 12.4 10.0 - 15.0 %    Platelet Count 287 150 - 450 10e3/uL    % Neutrophils 70 %    % Lymphocytes 19 %    % Monocytes 6 %    % Eosinophils 3 %    % Basophils 1 %    % Immature Granulocytes 1 %    NRBCs per 100 WBC 0 <1 /100    Absolute Neutrophils 8.1 1.6 - 8.3 10e3/uL    Absolute Lymphocytes 2.2 0.8 - 5.3 10e3/uL    Absolute Monocytes 0.7 0.0 - 1.3 10e3/uL    Absolute Eosinophils 0.3 0.0 - 0.7 10e3/uL    Absolute Basophils 0.1 0.0 - 0.2 10e3/uL    Absolute Immature Granulocytes 0.1 <=0.4 10e3/uL    Absolute NRBCs 0.0 10e3/uL   Respiratory Syncytial Virus (RSV) Antigen    Specimen: Nasopharyngeal; Swab   Result Value Ref Range    Respiratory Syncytial Virus antigen Negative Negative    Narrative    Test results must be correlated with clinical data. If necessary, results should be confirmed by a molecular assay or viral culture.   Influenza A/B antigen    Specimen: Nasopharyngeal; Swab   Result Value Ref Range    Influenza A antigen Negative Negative    Influenza B antigen Negative Negative    Narrative    Test results must be correlated with clinical data. If necessary, results should be confirmed by a molecular assay or viral culture.   XR Chest Port 1 View    Narrative    EXAM: XR CHEST PORT 1 VIEW  LOCATION: [facility]  DATE/TIME: 11/30/2022 5:32 AM    INDICATION: cough x 3 weeks, now productive  COMPARISON: 11/7/2018      Impression    IMPRESSION: Negative chest.       Medications   0.9% sodium chloride BOLUS (1,000 mLs Intravenous New Bag 11/30/22 0518)     Followed by   sodium chloride 0.9% infusion (has no administration in time range)   ketorolac (TORADOL) injection 30 mg (30 mg Intravenous Given 11/30/22 0518)   diphenhydrAMINE (BENADRYL) injection 25 mg (25 mg  Intravenous Given 11/30/22 0542)   metoclopramide (REGLAN) injection 10 mg (10 mg Intravenous Given 11/30/22 0599)       Assessments & Plan (with Medical Decision Making)  32-year-old smoker with a cough for the past 3 weeks which has now become productive of yellowish-green sputum.  Felt chilled earlier tonight and then woke this morning with a typical headache for her.  Has not taken anything for it.  1 L normal saline, Toradol for pain.  She then complained of more nausea so Benadryl and Reglan were ordered.  6:42 AM: Feeling much improved.  Headache has resolved.  Chest x-ray was clear.  She has been coughing for 3 weeks so I am going to go ahead and treat her with doxycycline to cover any bacterial component.  Zofran ODT for nausea.  She can add Mucinex and Delsym as needed.  Verbal and written discharge instructions given.  She is comfortable with this plan.  Work note written.     I have reviewed the nursing notes.    I have reviewed the findings, diagnosis, plan and need for follow up with the patient.       New Prescriptions    DEXTROMETHORPHAN (DELSYM) 30 MG/5ML LIQUID    Take 10 mLs (60 mg) by mouth 2 times daily    DOXYCYCLINE MONOHYDRATE (MONODOX) 100 MG CAPSULE    Take 1 capsule (100 mg) by mouth 2 times daily    GUAIFENESIN (MUCINEX) 600 MG 12 HR TABLET    Take 1-2 tablets (600-1,200 mg) by mouth 2 times daily for 14 days    ONDANSETRON (ZOFRAN ODT) 4 MG ODT TAB    Take 1 tablet (4 mg) by mouth every 6 hours as needed for nausea       Final diagnoses:   Acute bronchitis, unspecified organism   Acute nonintractable headache, unspecified headache type   Nausea and vomiting, unspecified vomiting type       11/30/2022   [facility] EMERGENCY DEPT     Rashaun Arreola MD  11/30/22 9046

## 2022-11-30 NOTE — DISCHARGE INSTRUCTIONS
Take the doxycycline twice a day for the next 7 days to cover any bacterial component of your bronchitis.  Zofran ODT as needed for nausea.  Ibuprofen 600 mg and Tylenol 1000 mg every 6 hours as needed for pain.  You can take them at the same time.  Take with food so the Ibuprofen doesn't upset your stomach.  You can use Mucinex to loosen the secretions and Delsym as needed for cough.  Both are over-the-counter.  Encourage fluids.  Recheck in clinic if persistent problems.  Return to ED if worse/concerns.  It was nice visiting with you this morning.  I am glad you are feeling better and hope you continue to improve.    Thank you for choosing [facility]. We appreciate the opportunity to meet your urgent medical needs. Please let us know if we could have done anything to make your stay more satisfying.    After discharge, please closely monitor for any new or worsening symptoms. Return to the Emergency Department if you develop any acute worsening signs or symptoms.    If you had lab work, cultures or imaging studies done during your stay, the final results may still be pending. We will call you if your plan of care needs to change. However, if you are not improving as expected, please follow up with your primary care provider or clinic.     Start any prescription medications that were prescribed to you and take them as directed.     Please see additional handouts that may be pertinent to your condition.

## 2022-12-07 ENCOUNTER — NURSE TRIAGE (OUTPATIENT)
Dept: FAMILY MEDICINE | Facility: CLINIC | Age: 32
End: 2022-12-07

## 2022-12-07 NOTE — TELEPHONE ENCOUNTER
Nurse Triage SBAR    Is this a 2nd Level Triage? NO    Situation: Patient was in 11/30 for bronchitis that has not resolved. She report she continues to cough nonstop and it is productive with green and yellow mucus and very foul taste. Unsure if she has had a fever, does not think she has but cannot stop coughing and continues to be very congested.     Background: Recently finished antibiotics for bacterial bronchitis    Assessment: Patient could be seen in  today if she is continuing having symptoms or try to manage at home with home remedies.     Protocol Recommended Disposition:   See PCP Within 3 Days    Recommendation: Patient to go to  this evening.     [facility]    Does the patient meet one of the following criteria for ADS visit consideration? No  Reason for Disposition    [1] Nasal discharge AND [2] present > 10 days    Additional Information    Negative: SEVERE difficulty breathing (e.g., struggling for each breath, speaks in single words)    Negative: Bluish (or gray) lips or face now    Negative: [1] Difficulty breathing AND [2] exposure to flames, smoke, or fumes    Negative: [1] Stridor AND [2] difficulty breathing    Negative: Sounds like a life-threatening emergency to the triager    Negative: [1] Previous asthma attacks AND [2] this feels like asthma attack    Negative: Dry cough (non-productive;  no sputum or minimal clear sputum)    Negative: [1] MODERATE difficulty breathing (e.g., speaks in phrases, SOB even at rest, pulse 100-120) AND [2] still present when not coughing    Negative: Chest pain  (Exception: MILD central chest pain, present only when coughing)    Negative: Patient sounds very sick or weak to the triager    Negative: [1] MILD difficulty breathing (e.g., minimal/no SOB at rest, SOB with walking, pulse <100) AND [2] still present when not coughing    Negative: [1] Coughed up blood AND [2] > 1 tablespoon (15 ml)  (Exception: Blood-tinged  "sputum.)    Negative: Fever > 103 F (39.4 C)    Negative: [1] Fever > 101 F (38.3 C) AND [2] age > 60 years    Negative: [1] Fever > 100.0 F (37.8 C) AND [2] bedridden (e.g., nursing home patient, CVA, chronic illness, recovering from surgery)    Negative: [1] Fever > 100.0 F (37.8 C) AND [2] diabetes mellitus or weak immune system (e.g., HIV positive, cancer chemo, splenectomy, organ transplant, chronic steroids)    Negative: Wheezing is present    Negative: SEVERE coughing spells (e.g., whooping sound after coughing, vomiting after coughing)    Negative: [1] Continuous (nonstop) coughing interferes with work or school AND [2] no improvement using cough treatment per Care Advice    Negative: Coughing up kandi-colored (reddish-brown) sputum    Negative: Fever present > 3 days (72 hours)    Negative: [1] Fever returns after gone for over 24 hours AND [2] symptoms worse or not improved    Negative: [1] Using nasal washes and pain medicine > 24 hours AND [2] sinus pain (around cheekbone or eye) persists    Negative: Earache    Negative: [1] Known COPD or other severe lung disease (i.e., bronchiectasis, cystic fibrosis, lung surgery) AND [2] worsening symptoms (i.e., increased sputum purulence or amount, increased breathing difficulty    Negative: Cough has been present for > 3 weeks    Answer Assessment - Initial Assessment Questions  1. ONSET: \"When did the cough begin?\"       2 weeks ago  2. SEVERITY: \"How bad is the cough today?\"       Bad, coughing frequently  3. SPUTUM: \"Describe the color of your sputum\" (none, dry cough; clear, white, yellow, green)      Green and yellow, foul tasting  4. HEMOPTYSIS: \"Are you coughing up any blood?\" If so ask: \"How much?\" (flecks, streaks, tablespoons, etc.)      No  5. DIFFICULTY BREATHING: \"Are you having difficulty breathing?\" If Yes, ask: \"How bad is it?\" (e.g., mild, moderate, severe)     - MILD: No SOB at rest, mild SOB with walking, speaks normally in sentences, can lie " "down, no retractions, pulse < 100.     - MODERATE: SOB at rest, SOB with minimal exertion and prefers to sit, cannot lie down flat, speaks in phrases, mild retractions, audible wheezing, pulse 100-120.     - SEVERE: Very SOB at rest, speaks in single words, struggling to breathe, sitting hunched forward, retractions, pulse > 120       Mild  6. FEVER: \"Do you have a fever?\" If Yes, ask: \"What is your temperature, how was it measured, and when did it start?\"      No  7. CARDIAC HISTORY: \"Do you have any history of heart disease?\" (e.g., heart attack, congestive heart failure)       No  8. LUNG HISTORY: \"Do you have any history of lung disease?\"  (e.g., pulmonary embolus, asthma, emphysema)      NO  9. PE RISK FACTORS: \"Do you have a history of blood clots?\" (or: recent major surgery, recent prolonged travel, bedridden)      NO  10. OTHER SYMPTOMS: \"Do you have any other symptoms?\" (e.g., runny nose, wheezing, chest pain)        congestion  11. PREGNANCY: \"Is there any chance you are pregnant?\" \"When was your last menstrual period?\"        No  12. TRAVEL: \"Have you traveled out of the country in the last month?\" (e.g., travel history, exposures)        No    Protocols used: COUGH - ACUTE [id number]      "

## 2023-02-24 ENCOUNTER — HOSPITAL ENCOUNTER (EMERGENCY)
Facility: CLINIC | Age: 33
Discharge: HOME | End: 2023-02-24
Attending: FAMILY MEDICINE | Admitting: FAMILY MEDICINE
Payer: COMMERCIAL

## 2023-02-24 VITALS
TEMPERATURE: 98 F | HEART RATE: 80 BPM | SYSTOLIC BLOOD PRESSURE: 138 MMHG | DIASTOLIC BLOOD PRESSURE: 92 MMHG | RESPIRATION RATE: 18 BRPM | OXYGEN SATURATION: 99 %

## 2023-02-24 DIAGNOSIS — J40 BRONCHITIS: ICD-10-CM

## 2023-02-24 LAB
FLUAV RNA SPEC QL NAA+PROBE: NEGATIVE
FLUBV RNA RESP QL NAA+PROBE: NEGATIVE
RSV RNA SPEC NAA+PROBE: NEGATIVE
SARS-COV-2 RNA RESP QL NAA+PROBE: NEGATIVE

## 2023-02-24 PROCEDURE — 99283 EMERGENCY DEPT VISIT LOW MDM: CPT | Mod: CS | Performed by: FAMILY MEDICINE

## 2023-02-24 PROCEDURE — 87637 SARSCOV2&INF A&B&RSV AMP PRB: CPT | Performed by: FAMILY MEDICINE

## 2023-02-24 PROCEDURE — 99284 EMERGENCY DEPT VISIT MOD MDM: CPT | Mod: CS | Performed by: FAMILY MEDICINE

## 2023-02-24 PROCEDURE — C9803 HOPD COVID-19 SPEC COLLECT: HCPCS | Performed by: FAMILY MEDICINE

## 2023-02-24 RX ORDER — ALBUTEROL SULFATE 90 UG/1
2 AEROSOL, METERED RESPIRATORY (INHALATION) EVERY 6 HOURS PRN
Qty: 18 G | Refills: 0 | Status: SHIPPED | OUTPATIENT
Start: 2023-02-24 | End: 2023-03-15

## 2023-02-24 RX ORDER — AZITHROMYCIN 250 MG/1
TABLET, FILM COATED ORAL
Qty: 6 TABLET | Refills: 0 | Status: SHIPPED | OUTPATIENT
Start: 2023-02-24 | End: 2023-03-01

## 2023-02-24 ASSESSMENT — ACTIVITIES OF DAILY LIVING (ADL): ADLS_ACUITY_SCORE: 35

## 2023-02-24 NOTE — ED PROVIDER NOTES
ED Provider Note     Leah Cox  [id number]  February 24, 2023      CC:     Chief Complaint   Patient presents with     Cold Symptoms          History is obtained from the patient.    HPI: Leah Cox is a 32 year old female presenting with 5-day history of cough that is becoming more productive of yellow to green sputum.  Patient had noticed slight streak of blood when she coughed up some sputum yesterday.  Overnight, she had to have some water to sip on to keep from coughing.  This morning she tried to go to work, and left the come to the ED.  She has a history of bronchitis, treated with antibiotics 3 months ago.  She is a smoker of 4 cigarettes/day.  She also works at a factory with some fumes.  She denies fever, chills, chest pain.  She tested negative for COVID 5 days ago.        PMH/Problem List:   Past Medical History:   Diagnosis Date     ASCUS with positive high risk HPV cervical 09/26/2012     Excessive or frequent menstruation 3/14/2017     H/O colposcopy with cervical biopsy 11/07/2012     S/P LEEP of cervix 01/23/2013       PSH:   Past Surgical History:   Procedure Laterality Date     COLONOSCOPY N/A 8/18/2022    Procedure: COLONOSCOPY, FLEXIBLE, WITH LESION REMOVAL USING SNARE;  Surgeon: Edwardo Madison DO;  Location:  GI     LAPAROSCOPIC CHOLECYSTECTOMY N/A 7/29/2022    Procedure: Laparoscopic cholecystectomy;  Surgeon: Edwardo Madison DO;  Location: [facility]     LEEP TX, CERVICAL  01/23/2013    SUZAN 2 with free margins - Care Everywhere.     RELEASE CARPAL TUNNEL Right 10/5/2016    Procedure: RELEASE CARPAL TUNNEL;  Surgeon: Christian Sebastian MD;  Location:  OR       MEDS: No current facility-administered medications on file prior to encounter.  acetaminophen (TYLENOL) 500 MG tablet, Take 500-1,000 mg by mouth every 6 hours as needed for mild pain  dextromethorphan (DELSYM) 30 MG/5ML liquid, Take 10 mLs  (60 mg) by mouth 2 times daily  doxycycline monohydrate (MONODOX) 100 MG capsule, Take 1 capsule (100 mg) by mouth 2 times daily  ibuprofen (ADVIL/MOTRIN) 200 MG capsule, Take 400 mg by mouth every 4 hours as needed for fever  levothyroxine (SYNTHROID/LEVOTHROID) 112 MCG tablet, TAKE 1 TABLET (112 MCG) BY MOUTH DAILY  ondansetron (ZOFRAN ODT) 4 MG ODT tab, Take 1 tablet (4 mg) by mouth every 6 hours as needed for nausea  oxyCODONE-acetaminophen (PERCOCET) 5-325 MG tablet, Take 1-2 tablets by mouth every 4 hours as needed for pain        Allergies: Cyclobenzaprine    Triage and nursing notes were reviewed.    ROS: All other systems were reviewed and are negative    Physical Exam:  Vitals:    02/24/23 0639   BP: (!) 138/92   Pulse: 80   Resp: 18   Temp: 98  F (36.7  C)   TempSrc: Oral   SpO2: 99%     GENERAL APPEARANCE: Alert, no acute respiratory distress, intermittent cough  HEAD: atraumatic  EYES: PERRL, EOMI  HENT: oral exam benign; oropharynx is noninjected  NECK: no adenopathy or masses, trachea is midline  RESP: lungs clear to auscultation -increased bronchial breath sounds at the upper lungs; no wheezes  CV: regular rate and rhythm, no significant murmurs or rubs  ABDOMEN: soft, nontender, no masses with normal bowel sounds  SKIN: no rash; as above  NEURO: mentation and speech normal; no focal deficits    Labs/Imaging Results:  No results found for this or any previous visit (from the past 24 hour(s)).        Impression:  Final diagnoses:   Bronchitis         ED Course & Medical Decision Making (Plan):  Leah Cox is a 32 year old female with 5-day history of productive cough of colored sputum.  Patient denies any fever or chills.  She has some intermittent wheezing.  She was coughing all night long, and left work this morning.  She does not think that she has COVID as she tested -5 days ago.  Patient showed a picture of colored sputum that she is bringing up.  Vital signs reveal a temp of 98 degrees,  respiratory rate of 18 with 99% oxygen saturation.  Lungs were clear except for increased bronchial breath sounds in the upper lung fields bilaterally.  Symptoms are consistent with acute bronchitis.  Begin Zithromax and albuterol inhaler.  Patient was urged to quit smoking.  See discharge instructions below.    Written after-visit summary and instructions were given at the time of discharge.          Follow Up Plan:  No follow-up provider specified.      Discharge Instructions:  Your symptoms are most likely due to acute bacterial bronchitis.  Begin Zithromax and take as directed for 5 days.  Use the albuterol inhaler, 2 puffs, every 4 hours as needed.  Continue to rest and push fluids.  Stop smoking.  Follow-up in the clinic in 5-7 days if not improving.  Return to the emergency department if your symptoms worsen.        Disclaimer: This note consists of words and symbols derived from keyboarding and dictation using voice recognition software.  As a result, there may be errors that have gone undetected.  Please consider this when interpreting information found in this note.       Arabella Payne MD  02/24/23 0681

## 2023-02-24 NOTE — ED TRIAGE NOTES
Patient presents with ongoing cough and congestion for the last week. States she coughs so hard she feels that she may vomit. Productive cough, green and brown mucus, per patient. Afebrile. Reports last time she had these sx she was diagnosed with bronchitis.     Triage Assessment     Row Name 02/24/23 0641       Triage Assessment (Adult)    Airway WDL WDL       Respiratory WDL    Respiratory WDL X;cough    Cough Frequency infrequent    Cough Type congested;productive       Skin Circulation/Temperature WDL    Skin Circulation/Temperature WDL WDL       Cardiac WDL    Cardiac WDL WDL       Peripheral/Neurovascular WDL    Peripheral Neurovascular WDL WDL       Cognitive/Neuro/Behavioral WDL    Cognitive/Neuro/Behavioral WDL WDL

## 2023-02-24 NOTE — DISCHARGE INSTRUCTIONS
Your symptoms are most likely due to acute bacterial bronchitis.  Begin Zithromax and take as directed for 5 days.  Use the albuterol inhaler, 2 puffs, every 4 hours as needed.  Continue to rest and push fluids.  Stop smoking.  Follow-up in the clinic in 5-7 days if not improving.  Return to the emergency department if your symptoms worsen.

## 2023-03-14 DIAGNOSIS — R06.2 WHEEZING: Primary | ICD-10-CM

## 2023-03-15 RX ORDER — ALBUTEROL SULFATE 90 UG/1
2 AEROSOL, METERED RESPIRATORY (INHALATION) EVERY 6 HOURS PRN
Qty: 18 G | Refills: 0 | Status: SHIPPED | OUTPATIENT
Start: 2023-03-15 | End: 2023-04-10

## 2023-04-08 DIAGNOSIS — R06.2 WHEEZING: ICD-10-CM

## 2023-04-10 RX ORDER — ALBUTEROL SULFATE 90 UG/1
AEROSOL, METERED RESPIRATORY (INHALATION)
Qty: 8.5 G | Refills: 0 | Status: SHIPPED | OUTPATIENT
Start: 2023-04-10 | End: 2023-05-05

## 2023-04-10 NOTE — TELEPHONE ENCOUNTER
Prescription approved per [facility] Refill Protocol.  Radha Burroughs RN on 4/10/2023 at 12:47 PM

## 2023-05-05 DIAGNOSIS — R06.2 WHEEZING: ICD-10-CM

## 2023-05-05 RX ORDER — ALBUTEROL SULFATE 90 UG/1
AEROSOL, METERED RESPIRATORY (INHALATION)
Qty: 8.5 G | Refills: 0 | Status: SHIPPED | OUTPATIENT
Start: 2023-05-05 | End: 2023-06-02

## 2023-05-06 DIAGNOSIS — R06.2 WHEEZING: ICD-10-CM

## 2023-05-08 RX ORDER — ALBUTEROL SULFATE 90 UG/1
AEROSOL, METERED RESPIRATORY (INHALATION)
Qty: 8.5 G | Refills: 0 | OUTPATIENT
Start: 2023-05-08

## 2023-06-01 DIAGNOSIS — R06.2 WHEEZING: ICD-10-CM

## 2023-06-02 RX ORDER — ALBUTEROL SULFATE 90 UG/1
AEROSOL, METERED RESPIRATORY (INHALATION)
Qty: 8.5 G | Refills: 0 | Status: SHIPPED | OUTPATIENT
Start: 2023-06-02 | End: 2023-06-21

## 2023-06-02 NOTE — TELEPHONE ENCOUNTER
Prescription approved per [facility] Refill Protocol.  Radha Burroughs RN on 6/2/2023 at 11:38 AM

## 2023-06-21 DIAGNOSIS — R06.2 WHEEZING: ICD-10-CM

## 2023-06-21 RX ORDER — ALBUTEROL SULFATE 90 UG/1
AEROSOL, METERED RESPIRATORY (INHALATION)
Qty: 8.5 G | Refills: 0 | Status: SHIPPED | OUTPATIENT
Start: 2023-06-21 | End: 2023-08-03

## 2023-06-21 NOTE — TELEPHONE ENCOUNTER
Pending Prescriptions:                       Disp   Refills    albuterol (PROAIR HFA/PROVENTIL HFA/ROSITA*8.5 g  0            Sig: INHALE 2 PUFFS BY MOUTH INTO THE LUNGS EVERY 6           HOURS AS NEEDED FOR SHORTNESS OF BREATH, WHEEZING           OR COUGH - DUE FOR FOLLOW UP APPOINTMENT    Medication is being filled for 1 time yoly refill only due to:  Patient is due for yearly visit    Please call and help schedule.  Thank you!    Radha Burroughs RN on 6/21/2023 at 12:57 PM

## 2023-06-22 DIAGNOSIS — R06.2 WHEEZING: ICD-10-CM

## 2023-06-22 RX ORDER — ALBUTEROL SULFATE 90 UG/1
AEROSOL, METERED RESPIRATORY (INHALATION)
Qty: 8.5 G | Refills: 0 | OUTPATIENT
Start: 2023-06-22

## 2023-06-23 DIAGNOSIS — R06.2 WHEEZING: ICD-10-CM

## 2023-06-23 RX ORDER — ALBUTEROL SULFATE 90 UG/1
AEROSOL, METERED RESPIRATORY (INHALATION)
Qty: 8.5 G | Refills: 0 | OUTPATIENT
Start: 2023-06-23

## 2023-06-24 DIAGNOSIS — R06.2 WHEEZING: ICD-10-CM

## 2023-06-26 RX ORDER — ALBUTEROL SULFATE 90 UG/1
AEROSOL, METERED RESPIRATORY (INHALATION)
Qty: 8.5 G | Refills: 0 | OUTPATIENT
Start: 2023-06-26

## 2023-07-17 ENCOUNTER — TELEPHONE (OUTPATIENT)
Dept: FAMILY MEDICINE | Facility: CLINIC | Age: 33
End: 2023-07-17
Payer: COMMERCIAL

## 2023-07-17 NOTE — TELEPHONE ENCOUNTER
Patient calling to follow up on an appointment for further evaluation of her hand. Informed of primaries message below. Transferred to Speciality to schedule with Sports Med for further evaluation. Roseann Roper LPN

## 2023-07-17 NOTE — TELEPHONE ENCOUNTER
Reason for Call:  Appointment Request    Patient requesting this type of appt:  right hand shooting pain    Requested provider: Jannie Cox    Reason patient unable to be scheduled: Not with their preferred provider    When does patient want to be seen/preferred time: Same day    Comments: right hand shooting pain    Could we send this information to you in [facility] or would you prefer to receive a phone call?:   Patient would prefer a phone call   Okay to leave a detailed message?: Yes at Work number on file:  [phone] (work)    Call taken on 7/17/2023 at 9:37 AM by Danilo lOivas

## 2023-07-24 ENCOUNTER — TRANSFERRED RECORDS (OUTPATIENT)
Dept: HEALTH INFORMATION MANAGEMENT | Facility: CLINIC | Age: 33
End: 2023-07-24
Payer: COMMERCIAL

## 2023-08-03 ENCOUNTER — OFFICE VISIT (OUTPATIENT)
Dept: FAMILY MEDICINE | Facility: CLINIC | Age: 33
End: 2023-08-03
Payer: COMMERCIAL

## 2023-08-03 VITALS
WEIGHT: 193.8 LBS | RESPIRATION RATE: 16 BRPM | TEMPERATURE: 97.1 F | SYSTOLIC BLOOD PRESSURE: 122 MMHG | HEIGHT: 63 IN | DIASTOLIC BLOOD PRESSURE: 74 MMHG | BODY MASS INDEX: 34.34 KG/M2 | OXYGEN SATURATION: 95 % | HEART RATE: 85 BPM

## 2023-08-03 DIAGNOSIS — R06.2 WHEEZING: ICD-10-CM

## 2023-08-03 DIAGNOSIS — F33.1 MAJOR DEPRESSIVE DISORDER, RECURRENT EPISODE, MODERATE (H): ICD-10-CM

## 2023-08-03 DIAGNOSIS — E03.9 HYPOTHYROIDISM, UNSPECIFIED TYPE: ICD-10-CM

## 2023-08-03 PROBLEM — E03.8 OTHER SPECIFIED HYPOTHYROIDISM: Status: ACTIVE | Noted: 2018-08-02

## 2023-08-03 PROCEDURE — 96127 BRIEF EMOTIONAL/BEHAV ASSMT: CPT | Performed by: NURSE PRACTITIONER

## 2023-08-03 PROCEDURE — 99214 OFFICE O/P EST MOD 30 MIN: CPT | Performed by: NURSE PRACTITIONER

## 2023-08-03 RX ORDER — LEVOTHYROXINE SODIUM 112 UG/1
112 TABLET ORAL DAILY
Qty: 90 TABLET | Refills: 3 | Status: SHIPPED | OUTPATIENT
Start: 2023-08-03

## 2023-08-03 RX ORDER — ALBUTEROL SULFATE 90 UG/1
AEROSOL, METERED RESPIRATORY (INHALATION)
Qty: 8.5 G | Refills: 3 | Status: SHIPPED | OUTPATIENT
Start: 2023-08-03

## 2023-08-03 ASSESSMENT — PATIENT HEALTH QUESTIONNAIRE - PHQ9
10. IF YOU CHECKED OFF ANY PROBLEMS, HOW DIFFICULT HAVE THESE PROBLEMS MADE IT FOR YOU TO DO YOUR WORK, TAKE CARE OF THINGS AT HOME, OR GET ALONG WITH OTHER PEOPLE: SOMEWHAT DIFFICULT
SUM OF ALL RESPONSES TO PHQ QUESTIONS 1-9: 9
SUM OF ALL RESPONSES TO PHQ QUESTIONS 1-9: 9

## 2023-08-03 ASSESSMENT — ASTHMA QUESTIONNAIRES: ACT_TOTALSCORE: 12

## 2023-08-03 ASSESSMENT — PAIN SCALES - GENERAL: PAINLEVEL: SEVERE PAIN (7)

## 2023-08-03 NOTE — PROGRESS NOTES
"  Assessment & Plan     Major depressive disorder, recurrent episode, moderate (H)  PHQ-9 is 9.  Denies suicidal ideation or plan- just wonders if anyone cares sometimes. She prefers to manage with lifestyle changes. Discussed good self care, sleep hygiene, mindfulness therapies and meditation.  - IN BEHAV ASSMT W/SCORE & DOCD/STAND INSTRUMENT    Hypothyroidism, unspecified type  Check labs today- stable  - TSH with free T4 reflex  - levothyroxine (SYNTHROID/LEVOTHROID) 112 MCG tablet  Dispense: 90 tablet; Refill: 3    Wheezing  Has used in past with her smoking history- quit one year ago  - albuterol (PROAIR HFA/PROVENTIL HFA/VENTOLIN HFA) 108 (90 Base) MCG/ACT inhaler  Dispense: 8.5 g; Refill: 3      32 minutes spent by me on the date of the encounter doing chart review, review of outside records, review of test results, interpretation of tests, patient visit, and documentation        BMI:   Estimated body mass index is 34.33 kg/m  as calculated from the following:    Height as of this encounter: 1.6 m (5' 3\").    Weight as of this encounter: 87.9 kg (193 lb 12.8 oz).   Weight management plan: Discussed healthy diet and exercise guidelines    Depression Screening Follow Up        8/3/2023     2:38 PM   PHQ   PHQ-9 Total Score 9   Q9: Thoughts of better off dead/self-harm past 2 weeks Several days   F/U: Thoughts of suicide or self-harm No   F/U: Safety concerns No         8/3/2023     2:38 PM   Last PHQ-9   1.  Little interest or pleasure in doing things 2   2.  Feeling down, depressed, or hopeless 1   3.  Trouble falling or staying asleep, or sleeping too much 1   4.  Feeling tired or having little energy 1   5.  Poor appetite or overeating 2   6.  Feeling bad about yourself 1   7.  Trouble concentrating 0   8.  Moving slowly or restless 0   Q9: Thoughts of better off dead/self-harm past 2 weeks 1   PHQ-9 Total Score 9   In the past two weeks have you had thoughts of suicide or self harm? No   Do you have " "concerns about your personal safety or the safety of others? No       Recommend schedule physical/ pap      Jannie Cox NP  [facility] SANDRA Garcia is a 32 year old, presenting for the following health issues:  Asthma, Thyroid Problem, and liver problem      8/3/2023     2:50 PM   Additional Questions   Roomed by Eveline RODNEY       History of Present Illness       Reason for visit:  Thyroid lose weight problems check up on fatty liver    She eats 2-3 servings of fruits and vegetables daily.She exercises with enough effort to increase her heart rate 9 or less minutes per day.  She exercises with enough effort to increase her heart rate 4 days per week.   She is taking medications regularly.   Has counseling appointment coming up.      Has felt gaining then losing- does not want to keep going up.  Has tried low carb.  Lost 13lbs month and tried more water.  Eats large amounts before bed.              Review of Systems   Constitutional, HEENT, cardiovascular, pulmonary, GI, , musculoskeletal, neuro, skin, endocrine and psych systems are negative, except as otherwise noted.      Objective    /74   Pulse 85   Temp 97.1  F (36.2  C) (Temporal)   Resp 16   Ht 1.6 m (5' 3\")   Wt 87.9 kg (193 lb 12.8 oz)   LMP 08/03/2023   SpO2 95%   BMI 34.33 kg/m    Body mass index is 34.33 kg/m .  Physical Exam   GENERAL: healthy, alert and no distress  NECK: no adenopathy, no asymmetry, masses, or scars and thyroid normal to palpation  RESP: lungs clear to auscultation - no rales, rhonchi or wheezes  CV: regular rate and rhythm, normal S1 S2, no S3 or S4, no murmur, click or rub, no peripheral edema and peripheral pulses strong  ABDOMEN: soft, nontender, no hepatosplenomegaly, no masses and bowel sounds normal  MS: no gross musculoskeletal defects noted, no edema    Results for orders placed or performed in visit on 08/03/23 (from the past 24 hour(s))   TSH with free T4 reflex   Result " Value Ref Range    TSH 3.89 0.30 - 4.20 uIU/mL

## 2023-09-01 ENCOUNTER — NURSE TRIAGE (OUTPATIENT)
Dept: FAMILY MEDICINE | Facility: CLINIC | Age: 33
End: 2023-09-01
Payer: COMMERCIAL

## 2023-09-01 NOTE — TELEPHONE ENCOUNTER
S: Abdomen pain    B: Patient calling triage back.  Patient states she has had lower abdomen pain for the last few days.  Stools have been softer, not watery but softer.  Still brown. Denies black stools. Denies blood in stools. Denies fevers. Denies vomiting. Has been nauseated.  She does note that she feels a burning at times.  Denies chest pain.  Denies shortness of breath. Pain comes and goes.  Seems to be more frequent that in the beginning. Denies blood in urine or pink tinged urine. Denies back pain.     A: Advised for patient to be seen today by provider. RN reviewed red flag symptoms with patient and when to seek emergency care.     R: Patient verbalizes understanding and is agreeable. No clinic openings in [place], [place], or [place] yet today.  Patient prefers to go to  in [place], she will go there now. Denies any other questions or concerns at this time.     Reason for Disposition   MODERATE pain (e.g., interferes with normal activities that comes and goes (cramps) lasts > 24 hours  (Exception: Pain with Vomiting or Diarrhea - see that Protocol.)    Additional Information   Negative: Passed out (i.e., fainted, collapsed and was not responding)   Negative: Shock suspected (e.g., cold/pale/clammy skin, too weak to stand, low BP, rapid pulse)   Negative: Sounds like a life-threatening emergency to the triager   Negative: Followed an abdomen (stomach) injury   Negative: Chest pain   Negative: Abdominal pain and pregnant < 20 weeks   Negative: Abdominal pain and pregnant 20 or more weeks   Negative: Pain is mainly in upper abdomen (if needed ask: 'is it mainly above the belly button?')   Negative: Abdomen bloating or swelling are main symptoms   Negative: SEVERE abdominal pain (e.g., excruciating)   Negative: Vomiting red blood or black (coffee ground) material   Negative: Blood in bowel movements  (Exception: Blood on surface of BM with constipation.)   Negative: Black or tarry bowel movements  (Exception:  Chronic-unchanged black-grey BMs AND is taking iron pills or Pepto-Bismol.)   Negative: MILD TO MODERATE constant pain lasting > 2 hours   Negative: Vomiting bile (green color)   Negative: Patient sounds very sick or weak to the triager   Negative: Vomiting and abdomen looks much more swollen than usual   Negative: White of the eyes have turned yellow (i.e., jaundice)   Negative: Blood in urine (red, pink, or tea-colored)   Negative: Fever > 103 F (39.4 C)   Negative: Fever > 101 F (38.3 C) and over 60 years of age   Negative: Fever > 100.0 F (37.8 C) and has diabetes mellitus or a weak immune system (e.g., HIV positive, cancer chemotherapy, organ transplant, splenectomy, chronic steroids)   Negative: Fever > 100.0 F (37.8 C) and bedridden (e.g., CVA, chronic illness, recovering from surgery)   Negative: Pregnant or could be pregnant (i.e., missed last menstrual period)    Protocols used: Abdominal Pain - Female-A-[place]

## 2023-09-06 ENCOUNTER — OFFICE VISIT (OUTPATIENT)
Dept: FAMILY MEDICINE | Facility: CLINIC | Age: 33
End: 2023-09-06
Payer: COMMERCIAL

## 2023-09-06 VITALS
OXYGEN SATURATION: 100 % | RESPIRATION RATE: 14 BRPM | HEART RATE: 82 BPM | SYSTOLIC BLOOD PRESSURE: 128 MMHG | TEMPERATURE: 96.5 F | DIASTOLIC BLOOD PRESSURE: 84 MMHG | HEIGHT: 63 IN | WEIGHT: 195 LBS | BODY MASS INDEX: 34.55 KG/M2

## 2023-09-06 DIAGNOSIS — K29.00 ACUTE GASTRITIS WITHOUT HEMORRHAGE, UNSPECIFIED GASTRITIS TYPE: Primary | ICD-10-CM

## 2023-09-06 LAB
ALBUMIN SERPL BCG-MCNC: 4.3 G/DL (ref 3.5–5.2)
ALP SERPL-CCNC: 67 U/L (ref 35–104)
ALT SERPL W P-5'-P-CCNC: 56 U/L (ref 0–50)
AMYLASE SERPL-CCNC: 35 U/L (ref 28–100)
ANION GAP SERPL CALCULATED.3IONS-SCNC: 15 MMOL/L (ref 7–15)
AST SERPL W P-5'-P-CCNC: 31 U/L (ref 0–45)
BILIRUB SERPL-MCNC: 0.3 MG/DL
BUN SERPL-MCNC: 13.8 MG/DL (ref 6–20)
CALCIUM SERPL-MCNC: 9 MG/DL (ref 8.6–10)
CHLORIDE SERPL-SCNC: 104 MMOL/L (ref 98–107)
CREAT SERPL-MCNC: 0.69 MG/DL (ref 0.51–0.95)
CRP SERPL-MCNC: <3 MG/L
DEPRECATED HCO3 PLAS-SCNC: 21 MMOL/L (ref 22–29)
EGFRCR SERPLBLD CKD-EPI 2021: >90 ML/MIN/1.73M2
ERYTHROCYTE [DISTWIDTH] IN BLOOD BY AUTOMATED COUNT: 12.9 % (ref 10–15)
GLUCOSE SERPL-MCNC: 88 MG/DL (ref 70–99)
HCT VFR BLD AUTO: 38.5 % (ref 35–47)
HGB BLD-MCNC: 13.4 G/DL (ref 11.7–15.7)
LIPASE SERPL-CCNC: 21 U/L (ref 13–60)
MCH RBC QN AUTO: 30.9 PG (ref 26.5–33)
MCHC RBC AUTO-ENTMCNC: 34.8 G/DL (ref 31.5–36.5)
MCV RBC AUTO: 89 FL (ref 78–100)
PLATELET # BLD AUTO: 315 10E3/UL (ref 150–450)
POTASSIUM SERPL-SCNC: 3.9 MMOL/L (ref 3.4–5.3)
PROT SERPL-MCNC: 6.7 G/DL (ref 6.4–8.3)
RBC # BLD AUTO: 4.33 10E6/UL (ref 3.8–5.2)
SODIUM SERPL-SCNC: 140 MMOL/L (ref 136–145)
WBC # BLD AUTO: 11 10E3/UL (ref 4–11)

## 2023-09-06 PROCEDURE — 99214 OFFICE O/P EST MOD 30 MIN: CPT | Performed by: FAMILY MEDICINE

## 2023-09-06 PROCEDURE — 83690 ASSAY OF LIPASE: CPT | Performed by: FAMILY MEDICINE

## 2023-09-06 PROCEDURE — 80053 COMPREHEN METABOLIC PANEL: CPT | Performed by: FAMILY MEDICINE

## 2023-09-06 PROCEDURE — 86140 C-REACTIVE PROTEIN: CPT | Performed by: FAMILY MEDICINE

## 2023-09-06 PROCEDURE — 85027 COMPLETE CBC AUTOMATED: CPT | Performed by: FAMILY MEDICINE

## 2023-09-06 PROCEDURE — 82150 ASSAY OF AMYLASE: CPT | Performed by: FAMILY MEDICINE

## 2023-09-06 PROCEDURE — 36415 COLL VENOUS BLD VENIPUNCTURE: CPT | Performed by: FAMILY MEDICINE

## 2023-09-06 ASSESSMENT — PATIENT HEALTH QUESTIONNAIRE - PHQ9
SUM OF ALL RESPONSES TO PHQ QUESTIONS 1-9: 7
SUM OF ALL RESPONSES TO PHQ QUESTIONS 1-9: 7
10. IF YOU CHECKED OFF ANY PROBLEMS, HOW DIFFICULT HAVE THESE PROBLEMS MADE IT FOR YOU TO DO YOUR WORK, TAKE CARE OF THINGS AT HOME, OR GET ALONG WITH OTHER PEOPLE: SOMEWHAT DIFFICULT

## 2023-09-06 ASSESSMENT — PAIN SCALES - GENERAL: PAINLEVEL: EXTREME PAIN (8)

## 2023-09-06 NOTE — PROGRESS NOTES
Assessment & Plan     Acute gastritis without hemorrhage, unspecified gastritis type  Radha is a 32-year-old female who presents to clinic with complaint of midepigastric pain for the last 1 to 2 weeks.  Pain is localized in the midepigastrium and then radiates inferiorly to the left lower quadrant at times.  Pain is worse after eating certain types of foods.  She has had some nausea but no vomiting.  She does have some water brash in the symptoms and heart heartburn.  Her bowels have been somewhat looser over the last 2 to 3 weeks.  She has had no melanotic stools and no bloody diarrhea.  Her stools seem to be more soft and mushy.  Her stool frequency is 2-3 times a day.    She is taken occasional antacids for her symptoms but finds them to be not very effective.  When her symptoms get worse she takes water which seems to help.    She is a smoker.  She has had previous cholecystectomy.  She denies regular alcohol use or over-the-counter nonsteroidal anti-inflammatory use.  She does use them occasionally for migraine headaches.    On exam she is well-appearing her abdomen is tender slightly in the mid epigastrium and diffusely tender with in all quadrants but there is no rebound rigidity or guarding.  Bowels are active.  Lab work is essentially normal.  Liver functions are normal as is amylase and lipase.  Her hemoglobin is normal.    Acute gastritis.  Start her on Prilosec 20 mg once daily for the next 6 weeks.  If symptoms have not improved at that time would consider an upper endoscopy and further evaluation for H. pylori.  - omeprazole (PRILOSEC) 20 MG DR capsule; Take 1 capsule (20 mg) by mouth daily  - Comprehensive metabolic panel (BMP + Alb, Alk Phos, ALT, AST, Total. Bili, TP); Future  - Amylase; Future  - Lipase; Future  - CRP, inflammation; Future  - CBC with platelets; Future  - Comprehensive metabolic panel (BMP + Alb, Alk Phos, ALT, AST, Total. Bili, TP)  - Amylase  - Lipase  - CRP, inflammation  -  CBC with platelets    Ordering of each unique test  Prescription drug management         Nicotine/Tobacco Cessation:  She reports that she has been smoking cigarettes. She has been smoking an average of .25 packs per day. She has never used smokeless tobacco.  Nicotine/Tobacco Cessation Plan:   Information offered: Patient not interested at this time       Follow-up Visit   Expected date:  Oct 06, 2023 (Approximate)      Follow Up Appointment Details:     Follow-up with whom?: PCP    Follow-Up for what?: Adult Preventive    How?: In Person                       Ruddy Sue MD  [facility]    Harsha Garcia is a 32 year old, presenting for the following health issues:  Gastric Problem      9/6/2023     3:32 PM   Additional Questions   Roomed by Harini Ni       Gastric Problem    History of Present Illness       Reason for visit:  Burning stomach sensation  Symptom onset:  1-2 weeks ago    She eats 2-3 servings of fruits and vegetables daily.She consumes 1 sweetened beverage(s) daily.She exercises with enough effort to increase her heart rate 10 to 19 minutes per day.  She exercises with enough effort to increase her heart rate 7 days per week.   She is taking medications regularly.       Patient Active Problem List   Diagnosis    Irregular periods    S/P LEEP of cervix    Tobacco use disorder    Major depressive disorder, recurrent episode, moderate (H)    RENAY (generalized anxiety disorder)    Other specified hypothyroidism     Current Outpatient Medications   Medication Sig Dispense Refill    albuterol (PROAIR HFA/PROVENTIL HFA/VENTOLIN HFA) 108 (90 Base) MCG/ACT inhaler INHALE 2 PUFFS BY MOUTH INTO THE LUNGS EVERY 6 HOURS AS NEEDED FOR SHORTNESS OF BREATH, WHEEZING OR COUGH - DUE FOR FOLLOW UP APPOINTMENT 8.5 g 3    ibuprofen (ADVIL/MOTRIN) 200 MG capsule Take 400 mg by mouth every 4 hours as needed for fever      levothyroxine (SYNTHROID/LEVOTHROID) 112 MCG tablet Take 1  "tablet (112 mcg) by mouth daily 90 tablet 3    acetaminophen (TYLENOL) 500 MG tablet Take 500-1,000 mg by mouth every 6 hours as needed for mild pain (Patient not taking: Reported on 9/6/2023)               Review of Systems   CONSTITUTIONAL: NEGATIVE for fever, chills, change in weight  ENT/MOUTH: NEGATIVE for ear, mouth and throat problems  RESP: NEGATIVE for significant cough or SOB  CV: NEGATIVE for chest pain, palpitations or peripheral edema  GI: POSITIVE for diarrhea, dyspepsia, heartburn or reflux, Hx gallbladder trouble, and Hx GERD and NEGATIVE for hematemesis, hematochezia, Hx IBD, Hx IBS, Hx stomach or duadenal ulcer, jaundice, melena, vomiting, and weight loss  ROS otherwise negative      Objective    /84   Pulse 82   Temp (!) 96.5  F (35.8  C) (Temporal)   Resp 14   Ht 1.6 m (5' 3\")   Wt 88.5 kg (195 lb)   LMP 08/03/2023   SpO2 100%   BMI 34.54 kg/m    Body mass index is 34.54 kg/m .  Physical Exam   GENERAL: healthy, alert and no distress  NECK: no adenopathy, no asymmetry, masses, or scars and thyroid normal to palpation  RESP: lungs clear to auscultation - no rales, rhonchi or wheezes  CV: regular rate and rhythm, normal S1 S2, no S3 or S4, no murmur, click or rub, no peripheral edema and peripheral pulses strong  ABDOMEN: tenderness epigastric and no rebound, rigidity or guarding   and bowel sounds normal  MS: no gross musculoskeletal defects noted, no edema    Erroneous Encounter on 08/03/2023   Component Date Value Ref Range Status    TSH 08/03/2023 3.89  0.30 - 4.20 uIU/mL Final     Results for orders placed or performed in visit on 09/06/23 (from the past 24 hour(s))   Comprehensive metabolic panel (BMP + Alb, Alk Phos, ALT, AST, Total. Bili, TP)   Result Value Ref Range    Sodium 140 136 - 145 mmol/L    Potassium 3.9 3.4 - 5.3 mmol/L    Chloride 104 98 - 107 mmol/L    Carbon Dioxide (CO2) 21 (L) 22 - 29 mmol/L    Anion Gap 15 7 - 15 mmol/L    Urea Nitrogen 13.8 6.0 - 20.0 mg/dL "    Creatinine 0.69 0.51 - 0.95 mg/dL    Calcium 9.0 8.6 - 10.0 mg/dL    Glucose 88 70 - 99 mg/dL    Alkaline Phosphatase 67 35 - 104 U/L    AST 31 0 - 45 U/L    ALT 56 (H) 0 - 50 U/L    Protein Total 6.7 6.4 - 8.3 g/dL    Albumin 4.3 3.5 - 5.2 g/dL    Bilirubin Total 0.3 <=1.2 mg/dL    GFR Estimate >90 >60 mL/min/1.73m2   Amylase   Result Value Ref Range    Amylase 35 28 - 100 U/L   Lipase   Result Value Ref Range    Lipase 21 13 - 60 U/L   CRP, inflammation   Result Value Ref Range    CRP Inflammation <3.00 <5.00 mg/L   CBC with platelets   Result Value Ref Range    WBC Count 11.0 4.0 - 11.0 10e3/uL    RBC Count 4.33 3.80 - 5.20 10e6/uL    Hemoglobin 13.4 11.7 - 15.7 g/dL    Hematocrit 38.5 35.0 - 47.0 %    MCV 89 78 - 100 fL    MCH 30.9 26.5 - 33.0 pg    MCHC 34.8 31.5 - 36.5 g/dL    RDW 12.9 10.0 - 15.0 %    Platelet Count 315 150 - 450 10e3/uL

## 2023-09-11 ENCOUNTER — TRANSFERRED RECORDS (OUTPATIENT)
Dept: HEALTH INFORMATION MANAGEMENT | Facility: CLINIC | Age: 33
End: 2023-09-11
Payer: COMMERCIAL

## 2023-09-30 ENCOUNTER — HEALTH MAINTENANCE LETTER (OUTPATIENT)
Age: 33
End: 2023-09-30

## 2023-12-29 DIAGNOSIS — K29.00 ACUTE GASTRITIS WITHOUT HEMORRHAGE, UNSPECIFIED GASTRITIS TYPE: ICD-10-CM

## 2023-12-30 DIAGNOSIS — K29.00 ACUTE GASTRITIS WITHOUT HEMORRHAGE, UNSPECIFIED GASTRITIS TYPE: ICD-10-CM

## 2024-01-04 ENCOUNTER — MYC MEDICAL ADVICE (OUTPATIENT)
Dept: FAMILY MEDICINE | Facility: CLINIC | Age: 34
End: 2024-01-04
Payer: COMMERCIAL

## 2024-01-04 NOTE — LETTER
January 9, 2024      Leah Cox  [address]  [place] [address]        Dear [place],     We are concerned about your health care.  We recently provided you with a medication refill.  Many medications require routine follow-up with your Doctor.       At this time we ask that: You schedule an appointment for your annual physical, and general follow up before medications runs out. Call the clinic at [phone] Option 1 to schedule.      Your prescription:  Has been filled. Please schedule a follow up visit for first available appointment. Courtesy refills will be given if needed until your scheduled appointment.         Thank you,     ana Umana MD/ Care Team (covering provider for Jannie Cox )

## 2024-01-04 NOTE — TELEPHONE ENCOUNTER
Patient informed via mychart to schedule. 1/9 reminder if not read to send letter.     Closing encounter.   Namrata Lane MA

## 2024-05-08 ENCOUNTER — HOSPITAL ENCOUNTER (OUTPATIENT)
Dept: GENERAL RADIOLOGY | Facility: CLINIC | Age: 34
Discharge: HOME | End: 2024-05-08
Attending: NURSE PRACTITIONER | Admitting: NURSE PRACTITIONER
Payer: COMMERCIAL

## 2024-05-08 DIAGNOSIS — M25.522 PAIN IN LEFT ELBOW: ICD-10-CM

## 2024-05-08 PROCEDURE — 73080 X-RAY EXAM OF ELBOW: CPT | Mod: RT

## 2024-05-21 ENCOUNTER — TRANSCRIBE ORDERS (OUTPATIENT)
Dept: OTHER | Age: 34
End: 2024-05-21

## 2024-05-21 DIAGNOSIS — S46.209A INJURY OF TENDON OF BICEPS: Primary | ICD-10-CM

## 2024-05-28 ENCOUNTER — THERAPY VISIT (OUTPATIENT)
Dept: PHYSICAL THERAPY | Facility: CLINIC | Age: 34
End: 2024-05-28
Attending: NURSE PRACTITIONER
Payer: COMMERCIAL

## 2024-05-28 DIAGNOSIS — S46.101A: Primary | ICD-10-CM

## 2024-05-28 PROCEDURE — 97161 PT EVAL LOW COMPLEX 20 MIN: CPT | Mod: GP | Performed by: PHYSICAL THERAPIST

## 2024-05-28 PROCEDURE — 97140 MANUAL THERAPY 1/> REGIONS: CPT | Mod: GP | Performed by: PHYSICAL THERAPIST

## 2024-05-28 PROCEDURE — 97110 THERAPEUTIC EXERCISES: CPT | Mod: GP | Performed by: PHYSICAL THERAPIST

## 2024-05-28 ASSESSMENT — ACTIVITIES OF DAILY LIVING (ADL)
CLEANING: MODERATE DIFFICULTY
LIFTING_A_BAG_OF_GROCERIES_TO_WAIST_LEVEL: A LITTLE BIT OF DIFFICULTY
UEFI_TOTAL_SCORE: 29
SLEEPING: MODERATE DIFFICULTY
YOUR_USUAL_HOBBIES,_RECREATIONAL_OR_SPORTING_ACTIVITIES: MODERATE DIFFICULTY
THROWING_A_BALL: EXTREME DIFFICULTY
TYING_OR_LACING_SHOES: A LITTLE BIT OF DIFFICULTY
OPENING_DOORS: MODERATE DIFFICULTY
PUSHING_UP_ON_YOUR_HANDS: MODERATE DIFFICULTY
DRESS: QUITE A BIT OF DIFFICULTY
DRIVING: QUITE A BIT OF DIFFICULTY
CARRYING_A_SMALL_SUITCASE_WITH_YOUR_AFFECTED_LIMB: EXTREME DIFFICULTY
ANY_OF_YOUR_USUAL_WORK,_HOUSEWORK_OR_SCHOOL_ACTIVITIES: MODERATE DIFFICULTY
UEFI_TOTAL_SCORE/80: .36
PLACING_AN_OBJECT_ONTO,_OR_REMOVING_IT_FROM_AN_OVERHEAD_SHELF: MODERATE DIFFICULTY
DOING_UP_BUTTONS: MODERATE DIFFICULTY
LAUNDERING_CLOTHES: QUITE A BIT OF DIFFICULTY
USING_TOOLS_OR_APPLIANCES: QUITE A BIT OF DIFFICULTY
PREPARING_FOOD: QUITE A BIT OF DIFFICULTY
WASHING_YOUR_HAIR_OR_SCALP: A LITTLE BIT OF DIFFICULTY
VACUUMING,_SWEEPING,_OR_RAKING: QUITE A BIT OF DIFFICULTY
PLEASE_INDICATE_YOR_PRIMARY_REASON_FOR_REFERRAL_TO_THERAPY:: ELBOW
OPENING_A_JAR: EXTREME DIFFICULTY

## 2024-05-28 NOTE — PROGRESS NOTES
PHYSICAL THERAPY EVALUATION  Type of Visit: Evaluation    See electronic medical record for Abuse and Falls Screening details.    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  Pt reports pain is on anterior forearm, biceps, and sometimes radiates into anterior shoulder.  Pt reports this occurred on April 23rd.          Presenting condition or subjective complaint: pain inside arm  Date of onset:      Relevant medical history: Thyroid problems   Dates & types of surgery: tonsils carpel tunnel gallbladder    Prior diagnostic imaging/testing results: X-ray     Prior therapy history for the same diagnosis, illness or injury:        Living Environment  Social support: With a significant other or spouse   Type of home: House   Stairs to enter the home:         Ramp: No   Stairs inside the home: Yes 15 Is there a railing: Yes   Help at home: Other  Equipment owned:       Employment:      Hobbies/Interests:      Patient goals for therapy: picking up stuff getting back to myself    Pain assessment: Pain present     Objective   SHOULDER EVALUATION  PAIN: Pain Level at Rest: 4/10  Pain Level with Use: 8/10  Pain Location: shoulder and elbow  Pain Quality: Burning  Pain Frequency: constant  Pain is Worst: daytime  Pain is Exacerbated By: Reaching, lifting, picking something up, turning her arm.   Pain is Relieved By: cold, NSAIDs, and rest  Pain Progression: Worsened  INTEGUMENTARY (edema, incisions): WNL  POSTURE: WNL  ROM:  R shoulder flexion: 135, abduction: 135, ER: 60, IR: T8  STRENGTH:  Rotator cuff: 5/5.  Biceps: 3+/5.   FLEXIBILITY:  Biceps tight.  SPECIAL TESTS:  Positive Yergeson's, positive Speeds, negative Chaidez-Ace.   PALPATION:  Tender long head of biceps tendon, mid mm belly of biceps, distal triceps, wrist flexors.   JOINT MOBILITY: WNL  CERVICAL  SCREEN: WNL    Assessment & Plan   CLINICAL IMPRESSIONS  Medical Diagnosis: Injury of biceps    Treatment Diagnosis: Injury of biceps   Impression/Assessment: Patient is a 33 year old female with R arm complaints.  The following significant findings have been identified: Pain, Decreased ROM/flexibility, Decreased joint mobility, Decreased strength, Impaired muscle performance, Decreased activity tolerance, and Impaired posture. These impairments interfere with their ability to perform self care tasks, work tasks, and recreational activities as compared to previous level of function.     Clinical Decision Making (Complexity):  Clinical Presentation: Stable/Uncomplicated  Clinical Presentation Rationale: based on medical and personal factors listed in PT evaluation  Clinical Decision Making (Complexity): Low complexity    PLAN OF CARE  Treatment Interventions:  Modalities: Cryotherapy, E-stim, Hot Pack, Ultrasound  Interventions: Manual Therapy, Neuromuscular Re-education, Therapeutic Activity, Therapeutic Exercise    Long Term Goals     PT Goal 1  Goal Identifier: HEP  Goal Description: Pt will be independent with HEP in order to improve R UE ROM, strength, and proprioception.  Target Date: 06/25/24  PT Goal 2  Goal Identifier: Pain  Goal Description: Pt will report no more than 2/10 pain in R UE in order to improve tolerance to work duties.  Target Date: 07/23/24      Frequency of Treatment: 1x/week  Duration of Treatment: 8 weeks    Recommended Referrals to Other Professionals: None.  Education Assessment:   Learner/Method: Patient  Education Comments: HEP    Risks and benefits of evaluation/treatment have been explained.   Patient/Family/caregiver agrees with Plan of Care.     Evaluation Time:     PT Eval, Low Complexity Minutes (70524): 15     Signing Clinician: Easton Ni PT

## 2024-05-30 ENCOUNTER — ANCILLARY PROCEDURE (OUTPATIENT)
Dept: ULTRASOUND IMAGING | Facility: CLINIC | Age: 34
End: 2024-05-30
Attending: NURSE PRACTITIONER
Payer: COMMERCIAL

## 2024-05-30 DIAGNOSIS — S49.91XA ARM INJURY, RIGHT, INITIAL ENCOUNTER: ICD-10-CM

## 2024-05-30 PROCEDURE — 76882 US LMTD JT/FCL EVL NVASC XTR: CPT | Mod: GC | Performed by: RADIOLOGY

## 2024-06-07 ENCOUNTER — THERAPY VISIT (OUTPATIENT)
Dept: PHYSICAL THERAPY | Facility: CLINIC | Age: 34
End: 2024-06-07
Attending: NURSE PRACTITIONER
Payer: COMMERCIAL

## 2024-06-07 DIAGNOSIS — S46.101A: Primary | ICD-10-CM

## 2024-06-07 PROCEDURE — 97140 MANUAL THERAPY 1/> REGIONS: CPT | Mod: GP | Performed by: PHYSICAL THERAPIST

## 2024-06-07 PROCEDURE — 97110 THERAPEUTIC EXERCISES: CPT | Mod: GP | Performed by: PHYSICAL THERAPIST

## 2024-06-11 ENCOUNTER — THERAPY VISIT (OUTPATIENT)
Dept: PHYSICAL THERAPY | Facility: CLINIC | Age: 34
End: 2024-06-11
Attending: NURSE PRACTITIONER
Payer: COMMERCIAL

## 2024-06-11 DIAGNOSIS — S46.101A: Primary | ICD-10-CM

## 2024-06-11 PROCEDURE — 97110 THERAPEUTIC EXERCISES: CPT | Mod: GP | Performed by: PHYSICAL THERAPIST

## 2024-06-11 PROCEDURE — 97140 MANUAL THERAPY 1/> REGIONS: CPT | Mod: GP | Performed by: PHYSICAL THERAPIST

## 2024-06-18 ENCOUNTER — TRANSFERRED RECORDS (OUTPATIENT)
Dept: HEALTH INFORMATION MANAGEMENT | Facility: CLINIC | Age: 34
End: 2024-06-18

## 2024-06-18 ENCOUNTER — MEDICAL CORRESPONDENCE (OUTPATIENT)
Dept: HEALTH INFORMATION MANAGEMENT | Facility: CLINIC | Age: 34
End: 2024-06-18

## 2024-06-18 ENCOUNTER — THERAPY VISIT (OUTPATIENT)
Dept: PHYSICAL THERAPY | Facility: CLINIC | Age: 34
End: 2024-06-18
Attending: NURSE PRACTITIONER
Payer: COMMERCIAL

## 2024-06-18 ENCOUNTER — TELEPHONE (OUTPATIENT)
Dept: ORTHOPEDICS | Facility: CLINIC | Age: 34
End: 2024-06-18

## 2024-06-18 DIAGNOSIS — S46.101D INJURY OF TENDON OF LONG HEAD OF RIGHT BICEPS, SUBSEQUENT ENCOUNTER: Primary | ICD-10-CM

## 2024-06-18 PROCEDURE — 97110 THERAPEUTIC EXERCISES: CPT | Mod: GP | Performed by: PHYSICAL THERAPIST

## 2024-06-18 PROCEDURE — 97140 MANUAL THERAPY 1/> REGIONS: CPT | Mod: GP | Performed by: PHYSICAL THERAPIST

## 2024-06-18 NOTE — TELEPHONE ENCOUNTER
Referral     Received from: [facility]    Referral Issue Date: 6/18/24    Patient has been referred to see orthopedics by Samantha Rosa CNP for right arm injury sustained 2 months ago, neg elbow xr, neg MSK US, imaging available via PACS. The urgency on the referral was written as routine. Please assist patient in scheduling.     A copy of the referral was sent to scanning and the original was placed in provider's mailbox.     Isabelle Carias RN   [facility]

## 2024-06-19 ENCOUNTER — OFFICE VISIT (OUTPATIENT)
Dept: ORTHOPEDICS | Facility: CLINIC | Age: 34
End: 2024-06-19
Payer: COMMERCIAL

## 2024-06-19 VITALS
TEMPERATURE: 98.2 F | DIASTOLIC BLOOD PRESSURE: 78 MMHG | SYSTOLIC BLOOD PRESSURE: 115 MMHG | BODY MASS INDEX: 34.28 KG/M2 | WEIGHT: 193.5 LBS

## 2024-06-19 DIAGNOSIS — S46.101A INJURY OF TENDON OF LONG HEAD OF RIGHT BICEPS, INITIAL ENCOUNTER: Primary | ICD-10-CM

## 2024-06-19 LAB
CRP SERPL-MCNC: <3 MG/L
D DIMER PPP FEU-MCNC: <0.27 UG/ML FEU (ref 0–0.5)
ERYTHROCYTE [SEDIMENTATION RATE] IN BLOOD BY WESTERGREN METHOD: 11 MM/HR (ref 0–20)
URATE SERPL-MCNC: 5.5 MG/DL (ref 2.4–5.7)

## 2024-06-19 PROCEDURE — 36415 COLL VENOUS BLD VENIPUNCTURE: CPT | Performed by: ORTHOPAEDIC SURGERY

## 2024-06-19 PROCEDURE — 85379 FIBRIN DEGRADATION QUANT: CPT | Performed by: ORTHOPAEDIC SURGERY

## 2024-06-19 PROCEDURE — 86140 C-REACTIVE PROTEIN: CPT | Performed by: ORTHOPAEDIC SURGERY

## 2024-06-19 PROCEDURE — 86225 DNA ANTIBODY NATIVE: CPT | Performed by: ORTHOPAEDIC SURGERY

## 2024-06-19 PROCEDURE — 86431 RHEUMATOID FACTOR QUANT: CPT | Performed by: ORTHOPAEDIC SURGERY

## 2024-06-19 PROCEDURE — 86038 ANTINUCLEAR ANTIBODIES: CPT | Performed by: ORTHOPAEDIC SURGERY

## 2024-06-19 PROCEDURE — 84550 ASSAY OF BLOOD/URIC ACID: CPT | Performed by: ORTHOPAEDIC SURGERY

## 2024-06-19 PROCEDURE — 85652 RBC SED RATE AUTOMATED: CPT | Performed by: ORTHOPAEDIC SURGERY

## 2024-06-19 PROCEDURE — 86200 CCP ANTIBODY: CPT | Performed by: ORTHOPAEDIC SURGERY

## 2024-06-19 PROCEDURE — 99203 OFFICE O/P NEW LOW 30 MIN: CPT | Performed by: ORTHOPAEDIC SURGERY

## 2024-06-19 ASSESSMENT — PAIN SCALES - GENERAL: PAINLEVEL: SEVERE PAIN (7)

## 2024-06-19 NOTE — PROGRESS NOTES
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  Pt reports pain is on anterior forearm, biceps, and sometimes radiates into anterior shoulder.  Pt reports this occurred on .    She does not recall having a previous injury. Mother with arthritis or osteoporosis.         Patient Active Problem List   Diagnosis    Irregular periods    S/P LEEP of cervix    Tobacco use disorder    Major depressive disorder, recurrent episode, moderate (H)    RENAY (generalized anxiety disorder)    Other specified hypothyroidism    Injury of tendon of long head of right biceps            Past Medical History:   Diagnosis Date    ASCUS with positive high risk HPV cervical 2012    Excessive or frequent menstruation 3/14/2017    H/O colposcopy with cervical biopsy 2012    SUZAN 2-3    S/P LEEP of cervix 2013    SUZAN 2 with free margins            Past Surgical History:   Procedure Laterality Date    COLONOSCOPY N/A 2022    Procedure: COLONOSCOPY, FLEXIBLE, WITH LESION REMOVAL USING SNARE;  Surgeon: Edwardo Madison DO;  Location:  GI    LAPAROSCOPIC CHOLECYSTECTOMY N/A 2022    Procedure: Laparoscopic cholecystectomy;  Surgeon: Edwardo Madison DO;  Location: [facility] OR    LEEP TX, CERVICAL  2013    SUZAN 2 with free margins - Care Everywhere.    RELEASE CARPAL TUNNEL Right 10/5/2016    Procedure: RELEASE CARPAL TUNNEL;  Surgeon: Christian Sebastian MD;  Location: [facility] OR            Social History     Tobacco Use    Smoking status: Every Day     Current packs/day: 0.00     Types: Cigarettes     Last attempt to quit: 2022     Years since quittin.9    Smokeless tobacco: Never   Substance Use Topics    Alcohol use: Yes     Alcohol/week: 0.0 standard drinks of alcohol     Comment: occ.            Family  History   Problem Relation Age of Onset    Hypertension Father                Allergies   Allergen Reactions    Cyclobenzaprine      Started giving her dreams            Current Outpatient Medications   Medication Sig Dispense Refill    albuterol (PROAIR HFA/PROVENTIL HFA/VENTOLIN HFA) 108 (90 Base) MCG/ACT inhaler INHALE 2 PUFFS BY MOUTH INTO THE LUNGS EVERY 6 HOURS AS NEEDED FOR SHORTNESS OF BREATH, WHEEZING OR COUGH - DUE FOR FOLLOW UP APPOINTMENT 8.5 g 3    ibuprofen (ADVIL/MOTRIN) 200 MG capsule Take 400 mg by mouth every 4 hours as needed for fever      levothyroxine (SYNTHROID/LEVOTHROID) 112 MCG tablet Take 1 tablet (112 mcg) by mouth daily 90 tablet 3    omeprazole (PRILOSEC) 20 MG DR capsule TAKE 1 CAPSULE (20 MG) BY MOUTH DAILY 30 capsule 0    acetaminophen (TYLENOL) 500 MG tablet Take 500-1,000 mg by mouth every 6 hours as needed for mild pain (Patient not taking: Reported on 9/6/2023)            Review Of Systems  Skin: negative  Eyes: negative  Ears/Nose/Throat: negative  Respiratory: No shortness of breath, dyspnea on exertion, cough, or hemoptysis    O: Physical Exam:  Pain to palpation over ECRB origin and flexor wad as well as anterior capsule insertion about coronoid. Adequate elbow flexion and extension.  CMS intact.  No edema/ecchymosis/effusion/crepitus.    Lab:CRP 3 (9/.23), TSH 3.89    Images:  Examination:  XR ELBOW RIGHT G/E 3 VIEWS     Date:  5/8/2024 4:04 PM      Clinical Information: Pain in left elbow     Comparison: none.                                                                      Impression:     1.  Normal joint alignment and spacing. No fracture or joint effusion    (Osteophyte, possible post traumatic R coronoid, old)         A:  possible capsular injury R elbow acute on chronic, possible inflammatory arthropathy      P:  obtain inflammatory markers and arthritic profile  Consider MRI elbow if continued exacerbation  See back after study  Continue current work  restrictions             In addition to the above assessment and plan each active problem on [place]'s problem list was evaluated today. This included the questioning of [place] for any medication problems. We will continue the current treatment plan for these active problems except as noted.

## 2024-06-19 NOTE — Clinical Note
6/19/2024      Leah Cox  [address]  [facility] [address]      Dear Colleague,    Thank you for referring your patient, Leah Cox, to the [facility]. Please see a copy of my visit note below.    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  Pt reports pain is on anterior forearm, biceps, and sometimes radiates into anterior shoulder.  Pt reports this occurred on April 23rd.           Patient Active Problem List   Diagnosis     Irregular periods     S/P LEEP of cervix     Tobacco use disorder     Major depressive disorder, recurrent episode, moderate (H)     RENAY (generalized anxiety disorder)     Other specified hypothyroidism     Injury of tendon of long head of right biceps            Past Medical History:   Diagnosis Date     ASCUS with positive high risk HPV cervical 09/26/2012     Excessive or frequent menstruation 3/14/2017     H/O colposcopy with cervical biopsy 11/07/2012    SUZAN 2-3     S/P LEEP of cervix 01/23/2013    SUZAN 2 with free margins            Past Surgical History:   Procedure Laterality Date     COLONOSCOPY N/A 8/18/2022    Procedure: COLONOSCOPY, FLEXIBLE, WITH LESION REMOVAL USING SNARE;  Surgeon: Edwardo Madison DO;  Location:  GI     LAPAROSCOPIC CHOLECYSTECTOMY N/A 7/29/2022    Procedure: Laparoscopic cholecystectomy;  Surgeon: Edwardo Madison DO;  Location:  OR     LEEP TX, CERVICAL  01/23/2013    SUZAN 2 with free margins - Care Everywhere.     RELEASE CARPAL TUNNEL Right 10/5/2016    Procedure: RELEASE CARPAL TUNNEL;  Surgeon: Christian Sebastian MD;  Location:  OR            Social History     Tobacco Use     Smoking status: Every Day     Current packs/day: 0.00     Types: Cigarettes     Last attempt to quit: 7/2/2022     Years since  quittin.9     Smokeless tobacco: Never   Substance Use Topics     Alcohol use: Yes     Alcohol/week: 0.0 standard drinks of alcohol     Comment: occ.            Family History   Problem Relation Age of Onset     Hypertension Father                Allergies   Allergen Reactions     Cyclobenzaprine      Started giving her dreams            Current Outpatient Medications   Medication Sig Dispense Refill     albuterol (PROAIR HFA/PROVENTIL HFA/VENTOLIN HFA) 108 (90 Base) MCG/ACT inhaler INHALE 2 PUFFS BY MOUTH INTO THE LUNGS EVERY 6 HOURS AS NEEDED FOR SHORTNESS OF BREATH, WHEEZING OR COUGH - DUE FOR FOLLOW UP APPOINTMENT 8.5 g 3     ibuprofen (ADVIL/MOTRIN) 200 MG capsule Take 400 mg by mouth every 4 hours as needed for fever       levothyroxine (SYNTHROID/LEVOTHROID) 112 MCG tablet Take 1 tablet (112 mcg) by mouth daily 90 tablet 3     omeprazole (PRILOSEC) 20 MG DR capsule TAKE 1 CAPSULE (20 MG) BY MOUTH DAILY 30 capsule 0     acetaminophen (TYLENOL) 500 MG tablet Take 500-1,000 mg by mouth every 6 hours as needed for mild pain (Patient not taking: Reported on 2023)            Review Of Systems  Skin: negative  Eyes: negative  Ears/Nose/Throat: negative  Respiratory: No shortness of breath, dyspnea on exertion, cough, or hemoptysis    O: Physical Exam:    Lab:    Images:  Examination:  XR ELBOW RIGHT G/E 3 VIEWS     Date:  2024 4:04 PM      Clinical Information: Pain in left elbow     Comparison: none.                                                                      Impression:     1.  Normal joint alignment and spacing. No fracture or joint effusion    (Osteophyte, possible post traumatic R coronoid, old)         A:  possible capsular injury R elbow acute on chronic, possible inflammatory arthropathy      P:  obtain inflammatory markers and arthritic profile  Consider MRI elbow if continued exacerbation  See back after study  Continue current work restrictions             In addition to the above  assessment and plan each active problem on [place]'s problem list was evaluated today. This included the questioning of [place] for any medication problems. We will continue the current treatment plan for these active problems except as noted.          Again, thank you for allowing me to participate in the care of your patient.        Sincerely,        Jose Luis Perkins MD

## 2024-06-19 NOTE — TELEPHONE ENCOUNTER
Appt with ortho has been scheduled:   Future Appointments 6/19/2024 - 12/16/2024        Date Visit Type Length Department Provider     6/19/2024  1:40 PM NEW ELBOW 20 min PH ORTHOPEDIC SURGERY Jose Luis Perkins MD    Location Instructions:     From [address]: Exit at [address] on south side [facility]. Turn right on [address]. Turn left at stoplight on [address].&nbsp;  [facility] will be in view two blocks ahead. Please follow the signage to utilize the  [facility]  entrance and parking lot when entering campus.              7/1/2024  8:00 AM NEW 40 min PH ORTHOPEDIC SURGERY Jose Luis Rodriguez PA-C    Location Instructions:     From [address]: Exit at [address] on south [facility]. Turn right on [address]. Turn left at stoplight on [address].&nbsp;  [facility] will be in view two blocks ahead. Please follow the signage to utilize the  [facility]  entrance and parking lot when entering campus.              7/2/2024 11:15 AM EXTREMITY TREATMENT  45 min PH PHYS THERAPY OUTPATIENT Easton Ni PT    Location Instructions:     From [address]: Exit at [email or web address] on [address] [facility]. Turn right on [address]. Turn left at stoplight on [address]. [facility] will be in view two blocks ahead  How to find our clinic: From the main driveway, follow the signs to the Rehab Services parking lot to your right. Drive through this parking lot past the [facility] entrance, the Rehab entrance is behind the [facility]ty Care building. Enter the hospital through door #9, the Rehab Services department is located on your left across from the cafeteria.  Parking: Free parking available in surface lot  Questions or to Reschedule: Contact our clinic: [phone], option 2.  This appointment is in a hospital-based location.&nbsp; Before your visit, you may want to check with your insurance company for coverage and referral options, including cost  differences between services provided in different clinic settings.&nbsp; For more information visit this link on the [employer] Website:&nbsp; [email or web address]

## 2024-06-20 LAB
ANA SER QL IF: NEGATIVE
CCP AB SER IA-ACNC: 0.5 U/ML
DSDNA AB SER-ACNC: 0.7 IU/ML
RHEUMATOID FACT SERPL-ACNC: <10 IU/ML

## 2024-07-02 ENCOUNTER — THERAPY VISIT (OUTPATIENT)
Dept: PHYSICAL THERAPY | Facility: CLINIC | Age: 34
End: 2024-07-02
Attending: NURSE PRACTITIONER
Payer: COMMERCIAL

## 2024-07-02 DIAGNOSIS — S46.101D INJURY OF TENDON OF LONG HEAD OF RIGHT BICEPS, SUBSEQUENT ENCOUNTER: Primary | ICD-10-CM

## 2024-07-02 PROCEDURE — 97110 THERAPEUTIC EXERCISES: CPT | Mod: GP | Performed by: PHYSICAL THERAPIST

## 2024-07-02 PROCEDURE — 97140 MANUAL THERAPY 1/> REGIONS: CPT | Mod: GP | Performed by: PHYSICAL THERAPIST

## 2024-07-09 ENCOUNTER — THERAPY VISIT (OUTPATIENT)
Dept: PHYSICAL THERAPY | Facility: CLINIC | Age: 34
End: 2024-07-09
Attending: NURSE PRACTITIONER
Payer: COMMERCIAL

## 2024-07-09 DIAGNOSIS — S46.101A INJURY OF TENDON OF LONG HEAD OF RIGHT BICEPS, INITIAL ENCOUNTER: Primary | ICD-10-CM

## 2024-07-09 PROCEDURE — 97140 MANUAL THERAPY 1/> REGIONS: CPT | Mod: GP | Performed by: PHYSICAL THERAPIST

## 2024-07-09 PROCEDURE — 97110 THERAPEUTIC EXERCISES: CPT | Mod: GP | Performed by: PHYSICAL THERAPIST

## 2024-07-15 ENCOUNTER — THERAPY VISIT (OUTPATIENT)
Dept: PHYSICAL THERAPY | Facility: CLINIC | Age: 34
End: 2024-07-15
Attending: NURSE PRACTITIONER
Payer: COMMERCIAL

## 2024-07-15 DIAGNOSIS — S46.101D INJURY OF TENDON OF LONG HEAD OF RIGHT BICEPS, SUBSEQUENT ENCOUNTER: Primary | ICD-10-CM

## 2024-07-15 PROCEDURE — 97110 THERAPEUTIC EXERCISES: CPT | Mod: GP | Performed by: PHYSICAL THERAPIST

## 2024-07-15 PROCEDURE — 97140 MANUAL THERAPY 1/> REGIONS: CPT | Mod: GP | Performed by: PHYSICAL THERAPIST

## 2024-07-19 ENCOUNTER — OFFICE VISIT (OUTPATIENT)
Dept: ORTHOPEDICS | Facility: CLINIC | Age: 34
End: 2024-07-19
Payer: COMMERCIAL

## 2024-07-19 VITALS
SYSTOLIC BLOOD PRESSURE: 123 MMHG | DIASTOLIC BLOOD PRESSURE: 86 MMHG | HEIGHT: 63 IN | BODY MASS INDEX: 33.66 KG/M2 | WEIGHT: 190 LBS | TEMPERATURE: 98.2 F

## 2024-07-19 DIAGNOSIS — S46.101A INJURY OF TENDON OF LONG HEAD OF RIGHT BICEPS, INITIAL ENCOUNTER: Primary | ICD-10-CM

## 2024-07-19 PROCEDURE — 99213 OFFICE O/P EST LOW 20 MIN: CPT | Performed by: ORTHOPAEDIC SURGERY

## 2024-07-19 NOTE — LETTER
2024      Leah Cox  [address]  [facility] [address]      Dear Colleague,    Thank you for referring your patient, Leah Cox, to the [facility]. Please see a copy of my visit note below.    S:         Patient Active Problem List   Diagnosis     Irregular periods     S/P LEEP of cervix     Tobacco use disorder     Major depressive disorder, recurrent episode, moderate (H)     RENAY (generalized anxiety disorder)     Other specified hypothyroidism     Injury of tendon of long head of right biceps            Past Medical History:   Diagnosis Date     ASCUS with positive high risk HPV cervical 2012     Excessive or frequent menstruation 3/14/2017     H/O colposcopy with cervical biopsy 2012    SUZAN 2-3     S/P LEEP of cervix 2013    SUZAN 2 with free margins            Past Surgical History:   Procedure Laterality Date     COLONOSCOPY N/A 2022    Procedure: COLONOSCOPY, FLEXIBLE, WITH LESION REMOVAL USING SNARE;  Surgeon: Edwardo Madison DO;  Location: [facility] GI     LAPAROSCOPIC CHOLECYSTECTOMY N/A 2022    Procedure: Laparoscopic cholecystectomy;  Surgeon: Edwardo Madison DO;  Location: [facility] OR     LEEP TX, CERVICAL  2013    SUZAN 2 with free margins - Care Everywhere.     RELEASE CARPAL TUNNEL Right 10/5/2016    Procedure: RELEASE CARPAL TUNNEL;  Surgeon: Christian Sebastian MD;  Location: [facility] OR            Social History     Tobacco Use     Smoking status: Every Day     Current packs/day: 0.00     Types: Cigarettes     Last attempt to quit: 2022     Years since quittin.0     Smokeless tobacco: Never   Substance Use Topics     Alcohol use: Yes     Alcohol/week: 0.0 standard drinks of alcohol     Comment: occ.            Family History   Problem Relation Age of Onset     Hypertension Father                Allergies   Allergen Reactions     Cyclobenzaprine      Started giving her dreams            Current Outpatient Medications    Medication Sig Dispense Refill     acetaminophen (TYLENOL) 500 MG tablet Take 500-1,000 mg by mouth every 6 hours as needed for mild pain       albuterol (PROAIR HFA/PROVENTIL HFA/VENTOLIN HFA) 108 (90 Base) MCG/ACT inhaler INHALE 2 PUFFS BY MOUTH INTO THE LUNGS EVERY 6 HOURS AS NEEDED FOR SHORTNESS OF BREATH, WHEEZING OR COUGH - DUE FOR FOLLOW UP APPOINTMENT 8.5 g 3     ibuprofen (ADVIL/MOTRIN) 200 MG capsule Take 400 mg by mouth every 4 hours as needed for fever       levothyroxine (SYNTHROID/LEVOTHROID) 112 MCG tablet Take 1 tablet (112 mcg) by mouth daily 90 tablet 3     omeprazole (PRILOSEC) 20 MG DR capsule TAKE 1 CAPSULE (20 MG) BY MOUTH DAILY 30 capsule 0          Review Of Systems  Skin: negative  Eyes: negative  Ears/Nose/Throat: negative  Respiratory: No shortness of breath, dyspnea on exertion, cough, or hemoptysis    O: Physical Exam: Pain to palpation about antecubital fossa.  Adequate flexion and extension.  CMS intact LUE. No evidence instability.  Some exacerbation symptoms hyperextension R elbow.    Lab:uric acid 5.5    Images:Examination:  XR ELBOW RIGHT G/E 3 VIEWS     Date:  5/8/2024 4:04 PM      Clinical Information: Pain in left elbow     Comparison: none.                                                                      Impression:     1.  Normal joint alignment and spacing. No fracture or joint effusion.     (Lesion about coronoid)    A: possible capsular injury about  coronoid R elbow      P:  MRI R elbow  See back after study.             In addition to the above assessment and plan each active problem on [place]'s problem list was evaluated today. This included the questioning of [place] for any medication problems. We will continue the current treatment plan for these active problems except as noted.        Again, thank you for allowing me to participate in the care of your patient.        Sincerely,        Jose Luis Perkins MD

## 2024-07-19 NOTE — PATIENT INSTRUCTIONS
Thank you for choosing [facility]!      FOLLOW-UP  Dr. Perkins would like you to follow-up in 1 month. Please stop by the  on your way out or call [phone] to schedule.       IMAGING  Please call [phone] to schedule your MRI .     *Once your imaging exam has been scheduled, our prior authorization team will connect with your insurance to ensure coverage of the exam. They will only reach out to you if a prior authorization is denied.  Please schedule your imaging exam at least 7 days out so our team has time to complete this process.  Failure to do so could result in insurance denial and you becoming responsible for the cost of the exam.     After your imaging appointment is scheduled, call [phone] to schedule your an in-person follow-up appointment with Dr. Perkins to discuss your results. .

## 2024-07-25 NOTE — PROGRESS NOTES
S:         Patient Active Problem List   Diagnosis    Irregular periods    S/P LEEP of cervix    Tobacco use disorder    Major depressive disorder, recurrent episode, moderate (H)    RENAY (generalized anxiety disorder)    Other specified hypothyroidism    Injury of tendon of long head of right biceps            Past Medical History:   Diagnosis Date    ASCUS with positive high risk HPV cervical 2012    Excessive or frequent menstruation 3/14/2017    H/O colposcopy with cervical biopsy 2012    SUZAN 2-3    S/P LEEP of cervix 2013    SUZAN 2 with free margins            Past Surgical History:   Procedure Laterality Date    COLONOSCOPY N/A 2022    Procedure: COLONOSCOPY, FLEXIBLE, WITH LESION REMOVAL USING SNARE;  Surgeon: Edwardo Madison DO;  Location: [facility] GI    LAPAROSCOPIC CHOLECYSTECTOMY N/A 2022    Procedure: Laparoscopic cholecystectomy;  Surgeon: Edwardo Madison DO;  Location: [facility] OR    LEEP TX, CERVICAL  2013    SUZAN 2 with free margins - Care Everywhere.    RELEASE CARPAL TUNNEL Right 10/5/2016    Procedure: RELEASE CARPAL TUNNEL;  Surgeon: Christian Sebastian MD;  Location: [facility] OR            Social History     Tobacco Use    Smoking status: Every Day     Current packs/day: 0.00     Types: Cigarettes     Last attempt to quit: 2022     Years since quittin.0    Smokeless tobacco: Never   Substance Use Topics    Alcohol use: Yes     Alcohol/week: 0.0 standard drinks of alcohol     Comment: occ.            Family History   Problem Relation Age of Onset    Hypertension Father                Allergies   Allergen Reactions    Cyclobenzaprine      Started giving her dreams            Current Outpatient Medications   Medication Sig Dispense Refill    acetaminophen (TYLENOL) 500 MG tablet Take 500-1,000 mg by mouth every 6 hours as needed for mild pain      albuterol (PROAIR HFA/PROVENTIL HFA/VENTOLIN HFA) 108 (90 Base) MCG/ACT inhaler INHALE 2 PUFFS BY MOUTH INTO THE  LUNGS EVERY 6 HOURS AS NEEDED FOR SHORTNESS OF BREATH, WHEEZING OR COUGH - DUE FOR FOLLOW UP APPOINTMENT 8.5 g 3    ibuprofen (ADVIL/MOTRIN) 200 MG capsule Take 400 mg by mouth every 4 hours as needed for fever      levothyroxine (SYNTHROID/LEVOTHROID) 112 MCG tablet Take 1 tablet (112 mcg) by mouth daily 90 tablet 3    omeprazole (PRILOSEC) 20 MG DR capsule TAKE 1 CAPSULE (20 MG) BY MOUTH DAILY 30 capsule 0          Review Of Systems  Skin: negative  Eyes: negative  Ears/Nose/Throat: negative  Respiratory: No shortness of breath, dyspnea on exertion, cough, or hemoptysis    O: Physical Exam: Pain to palpation about antecubital fossa.  Adequate flexion and extension.  CMS intact LUE. No evidence instability.  Some exacerbation symptoms hyperextension R elbow.    Lab:uric acid 5.5    Images:Examination:  XR ELBOW RIGHT G/E 3 VIEWS     Date:  5/8/2024 4:04 PM      Clinical Information: Pain in left elbow     Comparison: none.                                                                      Impression:     1.  Normal joint alignment and spacing. No fracture or joint effusion.     (Lesion about coronoid)    A: possible capsular injury about  coronoid R elbow      P:  MRI R elbow  See back after study.             In addition to the above assessment and plan each active problem on [place]'s problem list was evaluated today. This included the questioning of [place] for any medication problems. We will continue the current treatment plan for these active problems except as noted.

## 2024-08-02 ENCOUNTER — HOSPITAL ENCOUNTER (OUTPATIENT)
Dept: MRI IMAGING | Facility: CLINIC | Age: 34
Discharge: HOME | End: 2024-08-02
Attending: ORTHOPAEDIC SURGERY | Admitting: ORTHOPAEDIC SURGERY
Payer: COMMERCIAL

## 2024-08-02 DIAGNOSIS — S46.101A INJURY OF TENDON OF LONG HEAD OF RIGHT BICEPS, INITIAL ENCOUNTER: ICD-10-CM

## 2024-08-02 PROCEDURE — 73221 MRI JOINT UPR EXTREM W/O DYE: CPT | Mod: RT

## 2024-08-02 PROCEDURE — 73221 MRI JOINT UPR EXTREM W/O DYE: CPT | Mod: 26 | Performed by: RADIOLOGY

## 2024-08-16 ENCOUNTER — OFFICE VISIT (OUTPATIENT)
Dept: ORTHOPEDICS | Facility: CLINIC | Age: 34
End: 2024-08-16
Payer: COMMERCIAL

## 2024-08-16 VITALS
TEMPERATURE: 97.2 F | HEIGHT: 63 IN | DIASTOLIC BLOOD PRESSURE: 87 MMHG | BODY MASS INDEX: 33.66 KG/M2 | SYSTOLIC BLOOD PRESSURE: 122 MMHG | WEIGHT: 190 LBS

## 2024-08-16 DIAGNOSIS — S46.211D TEAR OF RIGHT BICEPS MUSCLE, SUBSEQUENT ENCOUNTER: Primary | ICD-10-CM

## 2024-08-16 PROCEDURE — 99213 OFFICE O/P EST LOW 20 MIN: CPT | Performed by: ORTHOPAEDIC SURGERY

## 2024-08-16 NOTE — LETTER
2024      Leah Cox  [address]  [facility] [address]      Dear Colleague,    Thank you for referring your patient, Leah Cox, to the [facility]. Please see a copy of my visit note below.    S:  States burning sensation antecubital fossa RUE when lifting or using R arm to carry/lift/ turn a screwdriver motion.         Patient Active Problem List   Diagnosis     Irregular periods     S/P LEEP of cervix     Tobacco use disorder     Major depressive disorder, recurrent episode, moderate (H)     RENAY (generalized anxiety disorder)     Other specified hypothyroidism     Injury of tendon of long head of right biceps            Past Medical History:   Diagnosis Date     ASCUS with positive high risk HPV cervical 2012     Excessive or frequent menstruation 3/14/2017     H/O colposcopy with cervical biopsy 2012    SUZAN 2-3     S/P LEEP of cervix 2013    SUZAN 2 with free margins            Past Surgical History:   Procedure Laterality Date     COLONOSCOPY N/A 2022    Procedure: COLONOSCOPY, FLEXIBLE, WITH LESION REMOVAL USING SNARE;  Surgeon: Edwardo Madison DO;  Location:  GI     LAPAROSCOPIC CHOLECYSTECTOMY N/A 2022    Procedure: Laparoscopic cholecystectomy;  Surgeon: Edwardo Madison DO;  Location: [facility] OR     LEEP TX, CERVICAL  2013    SUZAN 2 with free margins - Care Everywhere.     RELEASE CARPAL TUNNEL Right 10/5/2016    Procedure: RELEASE CARPAL TUNNEL;  Surgeon: Christian Sebastian MD;  Location: [facility] OR            Social History     Tobacco Use     Smoking status: Every Day     Current packs/day: 0.00     Types: Cigarettes     Last attempt to quit: 2022     Years since quittin.1     Smokeless tobacco: Never   Substance Use Topics     Alcohol use: Yes     Alcohol/week: 0.0 standard drinks of alcohol     Comment: occ.            Family History   Problem Relation Age of Onset     Hypertension Father                 Allergies   Allergen Reactions     Cyclobenzaprine      Started giving her dreams            Current Outpatient Medications   Medication Sig Dispense Refill     acetaminophen (TYLENOL) 500 MG tablet Take 500-1,000 mg by mouth every 6 hours as needed for mild pain       albuterol (PROAIR HFA/PROVENTIL HFA/VENTOLIN HFA) 108 (90 Base) MCG/ACT inhaler INHALE 2 PUFFS BY MOUTH INTO THE LUNGS EVERY 6 HOURS AS NEEDED FOR SHORTNESS OF BREATH, WHEEZING OR COUGH - DUE FOR FOLLOW UP APPOINTMENT 8.5 g 3     ibuprofen (ADVIL/MOTRIN) 200 MG capsule Take 400 mg by mouth every 4 hours as needed for fever       levothyroxine (SYNTHROID/LEVOTHROID) 112 MCG tablet Take 1 tablet (112 mcg) by mouth daily 90 tablet 3     omeprazole (PRILOSEC) 20 MG DR capsule TAKE 1 CAPSULE (20 MG) BY MOUTH DAILY 30 capsule 0          Review Of Systems  Skin: negative  Eyes: negative  Ears/Nose/Throat: negative  Respiratory: No shortness of breath, dyspnea on exertion, cough, or hemoptysis    O: Physical Exam:  Pain antecubital fossa RUE along course of biceps tendon to its insertion site.  Full elbow flexion and extension/ full pronation and supination.  CMS intact to RUE.    Images:    Narrative & Impression   Exam: MRI of the right elbow dated 8/2/2024.     COMPARISON: Radiographs dated 5/8/2024.     CLINICAL HISTORY: Injury to the long head of the biceps tendon.     TECHNIQUE: Multiplanar, multisequence MR imaging of the right elbow  was obtained using standard sequences in 3 orthogonal planes without  the use of intravenous or intra-articular gadolinium contrast.     FINDINGS:     Minimal fluid in the right elbow joint.      No marrow signal abnormalities to suggest fracture or marrow  infiltration.     The distal triceps tendon is intact. The distal brachialis tendon is  intact. Tendinosis of the distal biceps tendon with low-grade  partial-thickness tearing in the distal fibers at their attachment to  the radial tuberosity. No full-thickness  tendon tear or tendon  retraction.     The ulnar nerve is unremarkable. The muscle bulk is intact without  significant atrophy or soft tissue edema.     The common extensor tendon and radial collateral ligament is intact.  Visualized portion of the lateral ulnar collateral ligament are  intact.     The common flexor tendon and the ulnar collateral ligament are intact.                                                                      IMPRESSION:  1. Mild tendinosis of the distal biceps tendon with low-grade partial  thickness tearing at its insertion on the radial tuberosity. No  full-thickness tendon tear or tendon retraction.     2. No marrow signal abnormalities in the right elbow to suggest  fracture or marrow infiltration.     3. The remaining tendinous and ligamentous structures about the right  elbow are intact.            A:  Partial biceps tear at insertion RUE after injury in April      P:  Return to activities as tolerated realizing it could be 6 mos to a year before symptoms toi  Avoid heaving lifting or torque to RUE at elbow for that time  Notify if exacerbation symptoms  Work modifications as needed  See back 3 mos             In addition to the above assessment and plan each active problem on [place]'s problem list was evaluated today. This included the questioning of [place] for any medication problems. We will continue the current treatment plan for these active problems except as noted.      Again, thank you for allowing me to participate in the care of your patient.        Sincerely,        Jose Luis Perkins MD

## 2024-08-16 NOTE — PROGRESS NOTES
S:  States burning sensation antecubital fossa RUE when lifting or using R arm to carry/lift/ turn a screwdriver motion.         Patient Active Problem List   Diagnosis    Irregular periods    S/P LEEP of cervix    Tobacco use disorder    Major depressive disorder, recurrent episode, moderate (H)    RENAY (generalized anxiety disorder)    Other specified hypothyroidism    Injury of tendon of long head of right biceps            Past Medical History:   Diagnosis Date    ASCUS with positive high risk HPV cervical 2012    Excessive or frequent menstruation 3/14/2017    H/O colposcopy with cervical biopsy 2012    SUZAN 2-3    S/P LEEP of cervix 2013    SUZAN 2 with free margins            Past Surgical History:   Procedure Laterality Date    COLONOSCOPY N/A 2022    Procedure: COLONOSCOPY, FLEXIBLE, WITH LESION REMOVAL USING SNARE;  Surgeon: Edwardo Madison DO;  Location:  GI    LAPAROSCOPIC CHOLECYSTECTOMY N/A 2022    Procedure: Laparoscopic cholecystectomy;  Surgeon: Edwardo Madison DO;  Location: [facility]    LEEP TX, CERVICAL  2013    SUZAN 2 with free margins - Care Everywhere.    RELEASE CARPAL TUNNEL Right 10/5/2016    Procedure: RELEASE CARPAL TUNNEL;  Surgeon: Christian Sebastian MD;  Location: [facility]            Social History     Tobacco Use    Smoking status: Every Day     Current packs/day: 0.00     Types: Cigarettes     Last attempt to quit: 2022     Years since quittin.1    Smokeless tobacco: Never   Substance Use Topics    Alcohol use: Yes     Alcohol/week: 0.0 standard drinks of alcohol     Comment: occ.            Family History   Problem Relation Age of Onset    Hypertension Father                Allergies   Allergen Reactions    Cyclobenzaprine      Started giving her dreams            Current Outpatient Medications   Medication Sig Dispense Refill    acetaminophen (TYLENOL) 500 MG tablet Take 500-1,000 mg by mouth every 6 hours as needed for mild pain       albuterol (PROAIR HFA/PROVENTIL HFA/VENTOLIN HFA) 108 (90 Base) MCG/ACT inhaler INHALE 2 PUFFS BY MOUTH INTO THE LUNGS EVERY 6 HOURS AS NEEDED FOR SHORTNESS OF BREATH, WHEEZING OR COUGH - DUE FOR FOLLOW UP APPOINTMENT 8.5 g 3    ibuprofen (ADVIL/MOTRIN) 200 MG capsule Take 400 mg by mouth every 4 hours as needed for fever      levothyroxine (SYNTHROID/LEVOTHROID) 112 MCG tablet Take 1 tablet (112 mcg) by mouth daily 90 tablet 3    omeprazole (PRILOSEC) 20 MG DR capsule TAKE 1 CAPSULE (20 MG) BY MOUTH DAILY 30 capsule 0          Review Of Systems  Skin: negative  Eyes: negative  Ears/Nose/Throat: negative  Respiratory: No shortness of breath, dyspnea on exertion, cough, or hemoptysis    O: Physical Exam:  Pain antecubital fossa RUE along course of biceps tendon to its insertion site.  Full elbow flexion and extension/ full pronation and supination.  CMS intact to RUE.    Images:    Narrative & Impression   Exam: MRI of the right elbow dated 8/2/2024.     COMPARISON: Radiographs dated 5/8/2024.     CLINICAL HISTORY: Injury to the long head of the biceps tendon.     TECHNIQUE: Multiplanar, multisequence MR imaging of the right elbow  was obtained using standard sequences in 3 orthogonal planes without  the use of intravenous or intra-articular gadolinium contrast.     FINDINGS:     Minimal fluid in the right elbow joint.      No marrow signal abnormalities to suggest fracture or marrow  infiltration.     The distal triceps tendon is intact. The distal brachialis tendon is  intact. Tendinosis of the distal biceps tendon with low-grade  partial-thickness tearing in the distal fibers at their attachment to  the radial tuberosity. No full-thickness tendon tear or tendon  retraction.     The ulnar nerve is unremarkable. The muscle bulk is intact without  significant atrophy or soft tissue edema.     The common extensor tendon and radial collateral ligament is intact.  Visualized portion of the lateral ulnar collateral  ligament are  intact.     The common flexor tendon and the ulnar collateral ligament are intact.                                                                      IMPRESSION:  1. Mild tendinosis of the distal biceps tendon with low-grade partial  thickness tearing at its insertion on the radial tuberosity. No  full-thickness tendon tear or tendon retraction.     2. No marrow signal abnormalities in the right elbow to suggest  fracture or marrow infiltration.     3. The remaining tendinous and ligamentous structures about the right  elbow are intact.            A:  Partial biceps tear at insertion RUE after injury in April      P:  Return to activities as tolerated realizing it could be 6 mos to a year before symptoms toi  Avoid heaving lifting or torque to RUE at elbow for that time  Notify if exacerbation symptoms  Work modifications as needed  See back 3 mos             In addition to the above assessment and plan each active problem on [place]'s problem list was evaluated today. This included the questioning of [place] for any medication problems. We will continue the current treatment plan for these active problems except as noted.

## 2024-08-16 NOTE — LETTER
August 16, 2024    To Whom It May Concern:    Leah Cox was seen in our clinic. She may return to work with the following: limited to light duty - lifting no greater than 20 pounds and no use of Right arm over shoulders on or about the next follow up with Dr. Perkins.      Sincerely,            Jose Luis Perkins

## 2024-08-19 ENCOUNTER — TELEPHONE (OUTPATIENT)
Dept: ADMISSION | Facility: CLINIC | Age: 34
End: 2024-08-19
Payer: COMMERCIAL

## 2024-08-19 NOTE — TELEPHONE ENCOUNTER
Reason for Call:  Form, our goal is to have forms completed with 72 hours, however, some forms may require a visit or additional information.    Type of letter, form or note:  disability    Who is the form from?: Insurance comp    Where did the form come from: form was faxed in    What clinic location was the form placed at?: [facility] [place]    Where the form was placed:  Dr. Jose Luis Perkins Box/Folder    What number is listed as a contact on the form?: [phone]  FAX#: [phone]       Additional comments: [place] [facility] [place] [facility] Benefit  Suzie Harris  Requesting completion of Attending Physician's statement and to be faxed back.    Call taken on 8/19/2024 at 11:35 AM by Tabatha Joshi

## 2024-08-20 NOTE — TELEPHONE ENCOUNTER
"Patient's form was filled out, reviewed, and signed by provider.      Form was faxed to the designated fax number: [phone]    A copy was sent to scanning.     A copy was placed in the lower level \"faxed\" file/drawer.      Isabelle Carias RN   [facility]    "

## 2024-08-26 NOTE — PROGRESS NOTES
07/15/24 0500   Appointment Info   Signing clinician's name / credentials Easton Ni, PT, DPT, OCS   Visits Used 7   Medical Diagnosis Injury of biceps   PT Tx Diagnosis Injury of biceps   Quick Adds Certification   Progress Note/Certification   Start of Care Date 05/28/24   Therapy Frequency 1x/week   Predicted Duration 8 weeks   Certification date from 05/28/24   Progress Note Completed Date 05/28/24   GOALS   PT Goals 2   PT Goal 1   Goal Identifier HEP   Goal Description Pt will be independent with HEP in order to improve R UE ROM, strength, and proprioception.   Target Date 06/25/24   PT Goal 2   Goal Identifier Pain   Goal Description Pt will report no more than 2/10 pain in R UE in order to improve tolerance to work duties.   Target Date 07/23/24   Subjective Report   Subjective Report Pt reports pain is more isolated to the elbow and forearm at this time.  Pt rates at 5/10 currently.  Pt reports lifting at work increased pain and swelling.   Objective Measures   Objective Measures Objective Measure 1   Objective Measure 1   Objective Measure Palpation   Details Increased tenderness to midline of the forearm and upper trap and pec   Treatment Interventions (PT)   Interventions Manual Therapy;Therapeutic Procedure/Exercise   Therapeutic Procedure/Exercise   Therapeutic Procedures: strength, endurance, ROM, flexibility minutes (56775) 10   Ther Proc 1 - Details Reviewed HEP, performed Shoulder elevation with 2# x 20 with good tolerance added B shoulder elevation holding a single 6# dumbbell x 10 reps with slight discomfort.   Skilled Intervention Exercise selection and instruction.   Patient Response/Progress Demonstrated understanding.   Manual Therapy   Manual Therapy: Mobilization, MFR, MLD, friction massage minutes (19841) 25   Manual Therapy 1 - Details STM/MFR to R biceps, forearm flexors, distal triceps, R upper trap, SCM, pecs.  Passive nerve glides for median, ulnar, and radial nerves.   Skilled  Intervention Manual techniques for tissue and joint mobility.   Patient Response/Progress Increased tenderness per pt report.   Education   Learner/Method Patient   Education Comments HEP   Plan   Home program Wand flexion, elbow extension/flexion, elbow pronation/supination   Updates to plan of care 1x/week   Plan for next session F/u MD visit.   Total Session Time   Timed Code Treatment Minutes 35   Total Treatment Time (sum of timed and untimed services) 35         DISCHARGE  Reason for Discharge: No further expectation of progress.    Equipment Issued: none    Discharge Plan: Patient to continue home program.    Referring Provider:  Samantha Rosa

## 2024-10-21 ENCOUNTER — APPOINTMENT (OUTPATIENT)
Dept: CT IMAGING | Facility: CLINIC | Age: 34
End: 2024-10-21
Attending: FAMILY MEDICINE
Payer: COMMERCIAL

## 2024-10-21 ENCOUNTER — HOSPITAL ENCOUNTER (EMERGENCY)
Facility: CLINIC | Age: 34
Discharge: HOME | End: 2024-10-21
Attending: FAMILY MEDICINE | Admitting: FAMILY MEDICINE
Payer: COMMERCIAL

## 2024-10-21 VITALS
DIASTOLIC BLOOD PRESSURE: 93 MMHG | HEART RATE: 90 BPM | WEIGHT: 186 LBS | BODY MASS INDEX: 32.96 KG/M2 | OXYGEN SATURATION: 98 % | RESPIRATION RATE: 20 BRPM | SYSTOLIC BLOOD PRESSURE: 136 MMHG | TEMPERATURE: 98.1 F | HEIGHT: 63 IN

## 2024-10-21 DIAGNOSIS — R22.0 SWELLING OF LEFT SIDE OF FACE: ICD-10-CM

## 2024-10-21 LAB
ANION GAP SERPL CALCULATED.3IONS-SCNC: 14 MMOL/L (ref 7–15)
BASOPHILS # BLD AUTO: 0.1 10E3/UL (ref 0–0.2)
BASOPHILS NFR BLD AUTO: 1 %
BUN SERPL-MCNC: 10.4 MG/DL (ref 6–20)
CALCIUM SERPL-MCNC: 8.8 MG/DL (ref 8.8–10.4)
CHLORIDE SERPL-SCNC: 104 MMOL/L (ref 98–107)
CREAT SERPL-MCNC: 0.66 MG/DL (ref 0.51–0.95)
CRP SERPL-MCNC: <3 MG/L
EGFRCR SERPLBLD CKD-EPI 2021: >90 ML/MIN/1.73M2
EOSINOPHIL # BLD AUTO: 0.2 10E3/UL (ref 0–0.7)
EOSINOPHIL NFR BLD AUTO: 3 %
ERYTHROCYTE [DISTWIDTH] IN BLOOD BY AUTOMATED COUNT: 12.8 % (ref 10–15)
GLUCOSE SERPL-MCNC: 94 MG/DL (ref 70–99)
HCO3 SERPL-SCNC: 21 MMOL/L (ref 22–29)
HCT VFR BLD AUTO: 41.1 % (ref 35–47)
HGB BLD-MCNC: 14.4 G/DL (ref 11.7–15.7)
HOLD SPECIMEN: NORMAL
HOLD SPECIMEN: NORMAL
IMM GRANULOCYTES # BLD: 0.1 10E3/UL
IMM GRANULOCYTES NFR BLD: 2 %
LYMPHOCYTES # BLD AUTO: 1.6 10E3/UL (ref 0.8–5.3)
LYMPHOCYTES NFR BLD AUTO: 21 %
MCH RBC QN AUTO: 31.1 PG (ref 26.5–33)
MCHC RBC AUTO-ENTMCNC: 35 G/DL (ref 31.5–36.5)
MCV RBC AUTO: 89 FL (ref 78–100)
MONOCYTES # BLD AUTO: 0.6 10E3/UL (ref 0–1.3)
MONOCYTES NFR BLD AUTO: 8 %
NEUTROPHILS # BLD AUTO: 4.9 10E3/UL (ref 1.6–8.3)
NEUTROPHILS NFR BLD AUTO: 65 %
NRBC # BLD AUTO: 0 10E3/UL
NRBC BLD AUTO-RTO: 0 /100
PLATELET # BLD AUTO: 272 10E3/UL (ref 150–450)
POTASSIUM SERPL-SCNC: 3.9 MMOL/L (ref 3.4–5.3)
RBC # BLD AUTO: 4.63 10E6/UL (ref 3.8–5.2)
SODIUM SERPL-SCNC: 139 MMOL/L (ref 135–145)
WBC # BLD AUTO: 7.5 10E3/UL (ref 4–11)

## 2024-10-21 PROCEDURE — 86140 C-REACTIVE PROTEIN: CPT | Performed by: FAMILY MEDICINE

## 2024-10-21 PROCEDURE — 96374 THER/PROPH/DIAG INJ IV PUSH: CPT | Mod: 59 | Performed by: FAMILY MEDICINE

## 2024-10-21 PROCEDURE — 99285 EMERGENCY DEPT VISIT HI MDM: CPT | Mod: 25 | Performed by: FAMILY MEDICINE

## 2024-10-21 PROCEDURE — 250N000011 HC RX IP 250 OP 636: Performed by: FAMILY MEDICINE

## 2024-10-21 PROCEDURE — 250N000009 HC RX 250: Performed by: FAMILY MEDICINE

## 2024-10-21 PROCEDURE — 36415 COLL VENOUS BLD VENIPUNCTURE: CPT | Performed by: FAMILY MEDICINE

## 2024-10-21 PROCEDURE — 70491 CT SOFT TISSUE NECK W/DYE: CPT

## 2024-10-21 PROCEDURE — 85025 COMPLETE CBC W/AUTO DIFF WBC: CPT | Performed by: FAMILY MEDICINE

## 2024-10-21 PROCEDURE — 80048 BASIC METABOLIC PNL TOTAL CA: CPT | Performed by: FAMILY MEDICINE

## 2024-10-21 PROCEDURE — 99284 EMERGENCY DEPT VISIT MOD MDM: CPT | Performed by: FAMILY MEDICINE

## 2024-10-21 RX ORDER — NAPROXEN 500 MG/1
500 TABLET ORAL 2 TIMES DAILY WITH MEALS
COMMUNITY

## 2024-10-21 RX ORDER — IOPAMIDOL 755 MG/ML
500 INJECTION, SOLUTION INTRAVASCULAR ONCE
Status: COMPLETED | OUTPATIENT
Start: 2024-10-21 | End: 2024-10-21

## 2024-10-21 RX ORDER — CEPHALEXIN 500 MG/1
500 CAPSULE ORAL 4 TIMES DAILY
Qty: 30 CAPSULE | Refills: 0 | Status: SHIPPED | OUTPATIENT
Start: 2024-10-21

## 2024-10-21 RX ORDER — KETOROLAC TROMETHAMINE 30 MG/ML
30 INJECTION, SOLUTION INTRAMUSCULAR; INTRAVENOUS ONCE
Status: COMPLETED | OUTPATIENT
Start: 2024-10-21 | End: 2024-10-21

## 2024-10-21 RX ORDER — OXYCODONE HYDROCHLORIDE 5 MG/1
5 TABLET ORAL EVERY 6 HOURS PRN
Qty: 6 TABLET | Refills: 0 | Status: SHIPPED | OUTPATIENT
Start: 2024-10-21

## 2024-10-21 RX ADMIN — KETOROLAC TROMETHAMINE 30 MG: 30 INJECTION, SOLUTION INTRAMUSCULAR at 06:57

## 2024-10-21 RX ADMIN — IOPAMIDOL 90 ML: 755 INJECTION, SOLUTION INTRAVENOUS at 06:38

## 2024-10-21 RX ADMIN — SODIUM CHLORIDE 70 ML: 9 INJECTION, SOLUTION INTRAVENOUS at 06:38

## 2024-10-21 ASSESSMENT — COLUMBIA-SUICIDE SEVERITY RATING SCALE - C-SSRS
2. HAVE YOU ACTUALLY HAD ANY THOUGHTS OF KILLING YOURSELF IN THE PAST MONTH?: NO
6. HAVE YOU EVER DONE ANYTHING, STARTED TO DO ANYTHING, OR PREPARED TO DO ANYTHING TO END YOUR LIFE?: NO
1. IN THE PAST MONTH, HAVE YOU WISHED YOU WERE DEAD OR WISHED YOU COULD GO TO SLEEP AND NOT WAKE UP?: NO

## 2024-10-21 ASSESSMENT — ACTIVITIES OF DAILY LIVING (ADL)
ADLS_ACUITY_SCORE: 35
ADLS_ACUITY_SCORE: 35

## 2024-10-21 NOTE — LETTER
October 21, 2024      To Whom It May Concern:      Leah Cox was seen in our Emergency Department today, 10/21/24.  I expect her condition to improve over the next 2-3 days.  She missed work for 1 to 2 days but may return when improved.    Sincerely,      Lorenzo Payne MD  Staff Physician  [facility] Emergency Department

## 2024-10-21 NOTE — DISCHARGE INSTRUCTIONS
It is unclear what the cause of your facial swelling is.  Your CT scan did not definitively identify the cause for the swelling of the face.  This could be due to early cellulitis or lymph node swelling.  Begin Keflex 500 mg 4 times a day for 7 days.  Alternate ice/heat for 2 to 3 days.  Take your naproxen and reserve oxycodone for severe pain.  Follow-up with your primary care provider in 3 days if not improving.  Return to the emergency department if symptoms worsen.

## 2024-10-21 NOTE — ED PROVIDER NOTES
[facility] ED Provider Note   Patient: Leah Cox  MRN #:  [id number]  Date of Visit: October 21, 2024    CC:     Chief Complaint   Patient presents with    Facial Swelling     HPI:  Leah Cox is a 34 year old female who presented to the emergency department with acute onset of left-sided facial swelling that started Friday.  Patient initially thought that she had an ingrown hair but this morning when she woke up, there is more swelling under the chin moving into the right side.  Patient denies any dental pain.  She has been getting regular dental cleanings every 6 months.  She has not had any fever, chills, nausea, vomiting.  She has noticed some bumpy lesions on her face and scalp.  She has no ear pain.    Problem List:  Patient Active Problem List    Diagnosis Date Noted    Injury of tendon of long head of right biceps 05/28/2024     Priority: Medium    Other specified hypothyroidism 08/02/2018     Priority: Medium    Tobacco use disorder 05/01/2018     Priority: Medium    Major depressive disorder, recurrent episode, moderate (H) 04/20/2018     Priority: Medium    RENAY (generalized anxiety disorder) 04/20/2018     Priority: Medium    Irregular periods 12/08/2015     Priority: Medium    S/P LEEP of cervix 01/23/2013     Priority: Medium     5/15/09 NIL pap  6/30/11 ASCUS pap, + HR HPV.   8/30/11 [place] bx: SUZAN 2, ECC SUZAN 1  3/27/12 [place] bx: SUZAN 1, ECC: neg.  Pap: NIL, + HR HPV  9/26/12 ASCUS pap, + HR HPV  11/7/12 [place] bx: SUZAN 2, ECC: SUZAN 1.   1/23/13 LEEP: SUZAN 2 with free margins.   6/25/13 NIL pap     ** above information is from Care Everywhere  **  12/8/15 NIL pap, neg HPV. Plan: cotest in 1 yr, per Staff message from Dr. Farnsworth dated 2/5/18.  4/13/18 NIL pap, neg HR HPV.Plan: cotest in 3 years.         Past Medical History:   Diagnosis Date    ASCUS with positive high risk HPV cervical 09/26/2012    Excessive or frequent  "menstruation 3/14/2017    H/O colposcopy with cervical biopsy 2012    S/P LEEP of cervix 2013       MEDS: acetaminophen (TYLENOL) 500 MG tablet  albuterol (PROAIR HFA/PROVENTIL HFA/VENTOLIN HFA) 108 (90 Base) MCG/ACT inhaler  ibuprofen (ADVIL/MOTRIN) 200 MG capsule  levothyroxine (SYNTHROID/LEVOTHROID) 112 MCG tablet  naproxen (NAPROSYN) 500 MG tablet  cephALEXin (KEFLEX) 500 MG capsule  oxyCODONE (ROXICODONE) 5 MG tablet        ALLERGIES:    Allergies   Allergen Reactions    Cyclobenzaprine      Started giving her dreams       Past Surgical History:   Procedure Laterality Date    COLONOSCOPY N/A 2022    Procedure: COLONOSCOPY, FLEXIBLE, WITH LESION REMOVAL USING SNARE;  Surgeon: Edwardo Madison DO;  Location: [facility]    LAPAROSCOPIC CHOLECYSTECTOMY N/A 2022    Procedure: Laparoscopic cholecystectomy;  Surgeon: Edwardo Madison DO;  Location: [facility] OR    LEEP TX, CERVICAL  2013    SUZAN 2 with free margins - Care Everywhere.    RELEASE CARPAL TUNNEL Right 10/5/2016    Procedure: RELEASE CARPAL TUNNEL;  Surgeon: Christian Sebastian MD;  Location: [facility]       Social History     Tobacco Use    Smoking status: Every Day     Current packs/day: 0.00     Types: Cigarettes     Last attempt to quit: 2022     Years since quittin.3    Smokeless tobacco: Never   Vaping Use    Vaping status: Never Used   Substance Use Topics    Alcohol use: Yes     Alcohol/week: 0.0 standard drinks of alcohol     Comment: occ.    Drug use: No         Review of Systems   Except as noted in HPI, all other systems were reviewed and are negative    Physical Exam   Vitals were reviewed  Patient Vitals for the past 12 hrs:   BP Temp Temp src Pulse Resp SpO2 Height Weight   10/21/24 0529 (!) 136/93 98.1  F (36.7  C) Oral 90 20 98 % 1.6 m (5' 3\") 84.4 kg (186 lb)     GENERAL APPEARANCE: Alert, no acute respiratory distress  FACE: Left facial swelling, along the jawline.  There is some tenderness and " induration in the anterior cervical chain.  No distinct lymphadenopathy.  There is some tenderness in swelling in the right cheek.  EYES: Pupils are equal  HENT: normal external exam; good dentition.  No pus coming from [place]'s duct  NECK: Some asymmetry with more swelling along the left cervical region  RESP: normal respiratory effort; clear breath sounds bilaterally  CV: regular rate and rhythm; no significant murmurs, gallops or rubs  ABD: soft, no tenderness; no rebound or guarding; bowel sounds are normal  EXT: No calf tenderness or pitting edema  SKIN: Slight erythema over the left face        Available Lab/Imaging Results     Results for orders placed or performed during the hospital encounter of 10/21/24 (from the past 24 hour(s))   Maxie Draw    Narrative    The following orders were created for panel order Maxie Draw.  Procedure                               Abnormality         Status                     ---------                               -----------         ------                     Extra Green Top (Lithium...[[id number]]                      Final result               Extra Purple Top Tube[[id number]]                            Final result                 Please view results for these tests on the individual orders.   Extra Green Top (Lithium Heparin) Tube   Result Value Ref Range    Hold Specimen JIC    Extra Purple Top Tube   Result Value Ref Range    Hold Specimen extra    CBC with platelets differential    Narrative    The following orders were created for panel order CBC with platelets differential.  Procedure                               Abnormality         Status                     ---------                               -----------         ------                     CBC with platelets and d...[[id number]]                      Final result                 Please view results for these tests on the individual orders.   Basic metabolic panel   Result Value Ref Range    Sodium 139 135 -  145 mmol/L    Potassium 3.9 3.4 - 5.3 mmol/L    Chloride 104 98 - 107 mmol/L    Carbon Dioxide (CO2) 21 (L) 22 - 29 mmol/L    Anion Gap 14 7 - 15 mmol/L    Urea Nitrogen 10.4 6.0 - 20.0 mg/dL    Creatinine 0.66 0.51 - 0.95 mg/dL    GFR Estimate >90 >60 mL/min/1.73m2    Calcium 8.8 8.8 - 10.4 mg/dL    Glucose 94 70 - 99 mg/dL   CRP inflammation   Result Value Ref Range    CRP Inflammation <3.00 <5.00 mg/L   CBC with platelets and differential   Result Value Ref Range    WBC Count 7.5 4.0 - 11.0 10e3/uL    RBC Count 4.63 3.80 - 5.20 10e6/uL    Hemoglobin 14.4 11.7 - 15.7 g/dL    Hematocrit 41.1 35.0 - 47.0 %    MCV 89 78 - 100 fL    MCH 31.1 26.5 - 33.0 pg    MCHC 35.0 31.5 - 36.5 g/dL    RDW 12.8 10.0 - 15.0 %    Platelet Count 272 150 - 450 10e3/uL    % Neutrophils 65 %    % Lymphocytes 21 %    % Monocytes 8 %    % Eosinophils 3 %    % Basophils 1 %    % Immature Granulocytes 2 %    NRBCs per 100 WBC 0 <1 /100    Absolute Neutrophils 4.9 1.6 - 8.3 10e3/uL    Absolute Lymphocytes 1.6 0.8 - 5.3 10e3/uL    Absolute Monocytes 0.6 0.0 - 1.3 10e3/uL    Absolute Eosinophils 0.2 0.0 - 0.7 10e3/uL    Absolute Basophils 0.1 0.0 - 0.2 10e3/uL    Absolute Immature Granulocytes 0.1 <=0.4 10e3/uL    Absolute NRBCs 0.0 10e3/uL   Soft tissue neck CT w contrast    Narrative    EXAM: CT SOFT TISSUE NECK WITH CONTRAST  LOCATION: [facility]  DATE: 10/21/2024    INDICATION: Left jaw and facial swelling; increased swelling across the chin and now into the right side.  COMPARISON: None.  CONTRAST: 90 mL Isovue 370.  TECHNIQUE: Routine CT Soft Tissue Neck with IV contrast. Multiplanar reformats. Dose reduction techniques were used.    FINDINGS:     MUCOSAL SPACES/SOFT TISSUES: Subtle soft tissue stranding in the left perimandibular region. No abscess. No dominant periapical lucency. The source of this is uncertain. Suggestion of subtle soft tissue stranding in the left nasolabial region without   abscess.  Normal vocal cords and infraglottic trachea. Normal parapharyngeal space and subcutaneous soft tissues. Normal oral cavity,  spaces, and floor of mouth structures.    LYMPH NODES: Mild scattered reactive adenopathy.     SALIVARY GLANDS: Normal parotid and submandibular glands.    THYROID: Normal.     VESSELS: Vascular structures of the neck are patent.    VISUALIZED INTRACRANIAL/ORBITS/SINUSES: No abnormality of the visualized intracranial compartment or orbits. Visualized paranasal sinuses and mastoid air cells are clear.    OTHER: No destructive osseous lesion. The included lung apices are clear.      Impression    IMPRESSION:   1.  Subtle soft tissue stranding in the left perimandibular region without definite source. No fluid collection/abscess.    2.  Suggestion of mild left nasolabial soft tissue swelling. Findings are presumably infectious/inflammatory.                    Impression     Final diagnoses:   Swelling of left side of face         ED Course & Medical Decision Making   Leah Cox is a 34 year old female who presented to the emergency department with acute onset of left-sided facial swelling, progressing under the chin and now involving the right side.  Symptoms started Friday.  Patient thought that she had an ingrown hair.  She denies any dental pain, fever, chills, nausea, vomiting.    Vital signs reveal a temp of 98.1, blood pressure 136/93, heart rate of 90, respiration 20, 98% oxygen saturation.  On exam, patient has left-sided facial swelling.  There is mild erythema but not obviously cellulitic.  She has some tenderness in the submandibular and anterior cervical lymph region but no discrete nodes.  There is some involvement over the right jaw as well.  Tympanic membrane's are partially visualized because of cerumen.  Neck is supple without meningismus, lung and heart exam is normal.    Workup was initiated, including CT scan of the neck with contrast.    Patient's white  blood count is 7.5, with normal differential.  CRP is less than 3.0.  Basic metabolic panel is normal.  CT scan of the neck with contrast reveals subtle soft tissue stranding in the left perimandibular region without definitive source.  No fluid collection or abscess.  Suggestion of mild left nasolabial soft tissue swelling.  Findings are presumably infectious/inflammatory.  I reviewed the results with the patient.  She does not recall being stung by any insects although she has several areas of swelling over the left forehead and in the scalp.  We discussed the possibility that the swelling of the face may be due to reactive lymphadenopathy but could also be from early cellulitis.  There is no obvious fluid collection or abscess.  Other possibilities include mumps, salivary gland inflammation or dental source.    Begin Keflex 500 mg 3 times a day for 7 days.  Take Aleve that she has at home.  Reserve oxycodone for severe pain.  Follow-up in the clinic in 3 days if not improving.  Return to the ED at any time if symptoms worsen.        Discharge instructions:  It is unclear what the cause of your facial swelling is.  Your CT scan did not definitively identify the cause for the swelling of the face.  This could be due to early cellulitis or lymph node swelling.  Begin Keflex 500 mg 4 times a day for 7 days.  Alternate ice/heat for 2 to 3 days.  Take your naproxen and reserve oxycodone for severe pain.  Follow-up with your primary care provider in 3 days if not improving.  Return to the emergency department if symptoms worsen.      Disclaimer: This note consists of words and symbols derived from keyboarding and dictation using voice recognition software.  As a result, there may be errors that have gone undetected.  Please consider this when interpreting information found in this note.       Arabella Payne MD  10/21/24 0795

## 2024-10-21 NOTE — ED TRIAGE NOTES
Started Friday with some swelling to face and neck area on the left side and now starting to swell on the right as well      Triage Assessment (Adult)       Row Name 10/21/24 0513          Triage Assessment    Airway WDL WDL        Respiratory WDL    Respiratory WDL WDL        Skin Circulation/Temperature WDL    Skin Circulation/Temperature WDL WDL        Cardiac WDL    Cardiac WDL WDL        Peripheral/Neurovascular WDL    Peripheral Neurovascular WDL WDL        Cognitive/Neuro/Behavioral WDL    Cognitive/Neuro/Behavioral WDL WDL

## 2024-11-23 ENCOUNTER — HEALTH MAINTENANCE LETTER (OUTPATIENT)
Age: 34
End: 2024-11-23

## 2025-05-30 NOTE — TELEPHONE ENCOUNTER
Try moving Vyvanse to late morning or early afternoon to help more with evening snacking          Patient returned call and informed of providers message below and of further recommendations. Please review recommended orders and sign for scheduling of further evaluation. Roseann Roper LPN

## (undated) DEVICE — GLOVE PROTEXIS W/NEU-THERA 7.5  2D73TE75

## (undated) DEVICE — ESU GROUND PAD UNIVERSAL W/O CORD

## (undated) DEVICE — SUCTION MANIFOLD NEPTUNE 2 SYS 4 PORT 0702-020-000

## (undated) DEVICE — ESU CORD MONOPOLAR 10'  E0510

## (undated) DEVICE — ESU ENDO SCISSORS 5MM CVD 5DCS

## (undated) DEVICE — ESU SUCTION/IRRIGATION SYSTEM PISTOL GRIP

## (undated) DEVICE — ESU HOOK TIP 5MM CONMED

## (undated) DEVICE — ENDO TROCAR FIRST ENTRY KII FIOS Z-THRD 11X100MM CTF33

## (undated) DEVICE — SOL NACL 0.9% INJ 1000ML BAG 07983-09

## (undated) DEVICE — TUBING SUCTION 12"X1/4" N612

## (undated) DEVICE — ENDO TROCAR SLEEVE KII Z-THREADED 05X100MM CTS02

## (undated) DEVICE — KIT ENDO TURNOVER/PROCEDURE CARRY-ON 101822

## (undated) DEVICE — SOL WATER IRRIG 1000ML BOTTLE 07139-09

## (undated) DEVICE — SU MONOCRYL 4-0 PS-2 18" UND Y496G

## (undated) DEVICE — LUBRICATING JELLY 4.25OZ

## (undated) DEVICE — DEVICE ACTIVE LAP PLUME FILTERATION PUREVIEW 620030100

## (undated) DEVICE — NDL INSUFFLATION 13GA 120MM C2201

## (undated) DEVICE — ENDO TROCAR FIRST ENTRY KII FIOS Z-THRD 05X100MM CTF03

## (undated) DEVICE — GLOVE PROTEXIS BLUE W/NEU-THERA 8.0  2D73EB80

## (undated) DEVICE — PACK GENERAL LAPAOSCOPY

## (undated) DEVICE — ADH SKIN CLOSURE PREMIERPRO EXOFIN 1.0ML 3470

## (undated) DEVICE — ENDO CLIP CARTIRDGE K2 MED/LG 10 1112

## (undated) DEVICE — GLOVE EXAM NITRILE LG

## (undated) RX ORDER — LIDOCAINE HYDROCHLORIDE 20 MG/ML
INJECTION, SOLUTION EPIDURAL; INFILTRATION; INTRACAUDAL; PERINEURAL
Status: DISPENSED
Start: 2022-07-29

## (undated) RX ORDER — DEXAMETHASONE SODIUM PHOSPHATE 10 MG/ML
INJECTION, SOLUTION INTRAMUSCULAR; INTRAVENOUS
Status: DISPENSED
Start: 2022-07-29

## (undated) RX ORDER — FENTANYL CITRATE 50 UG/ML
INJECTION, SOLUTION INTRAMUSCULAR; INTRAVENOUS
Status: DISPENSED
Start: 2022-07-29

## (undated) RX ORDER — BUPIVACAINE HYDROCHLORIDE AND EPINEPHRINE 2.5; 5 MG/ML; UG/ML
INJECTION, SOLUTION EPIDURAL; INFILTRATION; INTRACAUDAL; PERINEURAL
Status: DISPENSED
Start: 2022-07-29

## (undated) RX ORDER — KETOROLAC TROMETHAMINE 30 MG/ML
INJECTION, SOLUTION INTRAMUSCULAR; INTRAVENOUS
Status: DISPENSED
Start: 2022-07-29

## (undated) RX ORDER — PROPOFOL 10 MG/ML
INJECTION, EMULSION INTRAVENOUS
Status: DISPENSED
Start: 2022-07-29

## (undated) RX ORDER — ONDANSETRON 2 MG/ML
INJECTION INTRAMUSCULAR; INTRAVENOUS
Status: DISPENSED
Start: 2022-07-29